# Patient Record
Sex: FEMALE | Race: WHITE | Employment: FULL TIME | URBAN - METROPOLITAN AREA
[De-identification: names, ages, dates, MRNs, and addresses within clinical notes are randomized per-mention and may not be internally consistent; named-entity substitution may affect disease eponyms.]

---

## 2017-01-09 ENCOUNTER — OFFICE VISIT (OUTPATIENT)
Dept: FAMILY MEDICINE CLINIC | Facility: CLINIC | Age: 52
End: 2017-01-09

## 2017-01-09 VITALS
TEMPERATURE: 98 F | SYSTOLIC BLOOD PRESSURE: 110 MMHG | BODY MASS INDEX: 33 KG/M2 | DIASTOLIC BLOOD PRESSURE: 80 MMHG | WEIGHT: 213 LBS | HEART RATE: 76 BPM | RESPIRATION RATE: 16 BRPM

## 2017-01-09 DIAGNOSIS — L03.213 PRESEPTAL CELLULITIS OF RIGHT EYE: Primary | ICD-10-CM

## 2017-01-09 PROCEDURE — 99213 OFFICE O/P EST LOW 20 MIN: CPT | Performed by: FAMILY MEDICINE

## 2017-01-09 RX ORDER — MECLIZINE HYDROCHLORIDE 25 MG/1
25 TABLET ORAL 3 TIMES DAILY PRN
Qty: 15 TABLET | Refills: 0 | Status: SHIPPED | OUTPATIENT
Start: 2017-01-09 | End: 2017-01-24

## 2017-01-09 RX ORDER — OFLOXACIN 3 MG/ML
2 SOLUTION/ DROPS OPHTHALMIC
COMMUNITY
Start: 2017-01-08 | End: 2017-03-13

## 2017-01-09 RX ORDER — CODEINE PHOSPHATE AND GUAIFENESIN 10; 100 MG/5ML; MG/5ML
5 SOLUTION ORAL EVERY 6 HOURS PRN
Qty: 236 ML | Refills: 0 | Status: SHIPPED
Start: 2017-01-09 | End: 2017-03-13

## 2017-01-09 RX ORDER — HYDROCHLOROTHIAZIDE 12.5 MG/1
CAPSULE, GELATIN COATED ORAL
COMMUNITY
End: 2017-01-09

## 2017-01-09 RX ORDER — AMOXICILLIN AND CLAVULANATE POTASSIUM 875; 125 MG/1; MG/1
875 TABLET, FILM COATED ORAL
COMMUNITY
Start: 2017-01-08 | End: 2017-01-19

## 2017-01-09 NOTE — PROGRESS NOTES
[facility] Family Medicine Office Note  Chief Complaint:   Patient presents with:  ER F/U: went to ER  1/7/2017, right eye infection follow up      HPI:   This is a 49year old female coming in for  Eye Problem   The right eye is affected.  This i mL by mouth every 6 (six) hours as needed for congestion. Disp: 236 mL Rfl: 0   Meclizine HCl 25 MG Oral Tab Take 1 tablet (25 mg total) by mouth 3 (three) times daily as needed for Dizziness.  Disp: 15 tablet Rfl: 0   hydrochlorothiazide 25 MG Oral Tab Sebastián Osier Head (left side): No preauricular, no posterior auricular and no occipital adenopathy present. She has cervical adenopathy. Right cervical: Superficial cervical adenopathy present.         Left cervical: Superficial cervical adenopathy present

## 2017-03-01 ENCOUNTER — PATIENT MESSAGE (OUTPATIENT)
Dept: FAMILY MEDICINE CLINIC | Facility: CLINIC | Age: 52
End: 2017-03-01

## 2017-03-02 NOTE — TELEPHONE ENCOUNTER
From: Rabia Favorite  To: Iesha Perry MD  Sent: 3/1/2017 9:58 PM CST  Subject: Non-Urgent Medical Question    Hello,  I see I am way overdue for a Pap smear and a colonoscopy. Should I make an appt with a gynecologist, I do not have one.  Are there gy

## 2017-03-06 NOTE — TELEPHONE ENCOUNTER
Below message received from patient via My Chart CRM Request:    ----- Message -----         From: Tash Garcia        Sent: 3/3/2017   1:34 PM          To:  Emr Feedback     Subject: Physical                                               Topic: Selec

## 2017-03-13 ENCOUNTER — OFFICE VISIT (OUTPATIENT)
Dept: FAMILY MEDICINE CLINIC | Facility: CLINIC | Age: 52
End: 2017-03-13

## 2017-03-13 VITALS
WEIGHT: 212 LBS | HEIGHT: 67 IN | DIASTOLIC BLOOD PRESSURE: 84 MMHG | HEART RATE: 94 BPM | BODY MASS INDEX: 33.27 KG/M2 | RESPIRATION RATE: 16 BRPM | OXYGEN SATURATION: 98 % | SYSTOLIC BLOOD PRESSURE: 120 MMHG

## 2017-03-13 DIAGNOSIS — Z23 NEED FOR DIPHTHERIA-TETANUS-PERTUSSIS (TDAP) VACCINE: ICD-10-CM

## 2017-03-13 DIAGNOSIS — Z13.228 SCREENING FOR ENDOCRINE, NUTRITIONAL, METABOLIC AND IMMUNITY DISORDER: ICD-10-CM

## 2017-03-13 DIAGNOSIS — Z13.0 SCREENING FOR ENDOCRINE, NUTRITIONAL, METABOLIC AND IMMUNITY DISORDER: ICD-10-CM

## 2017-03-13 DIAGNOSIS — R63.5 WEIGHT GAIN: ICD-10-CM

## 2017-03-13 DIAGNOSIS — R35.0 URINARY FREQUENCY: ICD-10-CM

## 2017-03-13 DIAGNOSIS — Z12.39 SCREENING FOR BREAST CANCER: ICD-10-CM

## 2017-03-13 DIAGNOSIS — Z13.29 SCREENING FOR ENDOCRINE, NUTRITIONAL, METABOLIC AND IMMUNITY DISORDER: ICD-10-CM

## 2017-03-13 DIAGNOSIS — Z01.419 WELL FEMALE EXAM WITH ROUTINE GYNECOLOGICAL EXAM: Primary | ICD-10-CM

## 2017-03-13 DIAGNOSIS — Z12.11 COLON CANCER SCREENING: ICD-10-CM

## 2017-03-13 DIAGNOSIS — D22.9 NEVUS OF MULTIPLE SITES: ICD-10-CM

## 2017-03-13 DIAGNOSIS — R10.2 RIGHT ADNEXAL TENDERNESS: ICD-10-CM

## 2017-03-13 DIAGNOSIS — Z12.4 SCREENING FOR CERVICAL CANCER: ICD-10-CM

## 2017-03-13 DIAGNOSIS — R14.0 ABDOMINAL BLOATING: ICD-10-CM

## 2017-03-13 DIAGNOSIS — Z13.21 SCREENING FOR ENDOCRINE, NUTRITIONAL, METABOLIC AND IMMUNITY DISORDER: ICD-10-CM

## 2017-03-13 PROCEDURE — 99396 PREV VISIT EST AGE 40-64: CPT | Performed by: FAMILY MEDICINE

## 2017-03-13 PROCEDURE — 87624 HPV HI-RISK TYP POOLED RSLT: CPT | Performed by: FAMILY MEDICINE

## 2017-03-13 PROCEDURE — 88175 CYTOPATH C/V AUTO FLUID REDO: CPT | Performed by: FAMILY MEDICINE

## 2017-03-13 PROCEDURE — 90715 TDAP VACCINE 7 YRS/> IM: CPT | Performed by: FAMILY MEDICINE

## 2017-03-13 PROCEDURE — 90471 IMMUNIZATION ADMIN: CPT | Performed by: FAMILY MEDICINE

## 2017-03-13 RX ORDER — IBUPROFEN 800 MG/1
TABLET ORAL
COMMUNITY
Start: 2017-02-17 | End: 2017-03-13

## 2017-03-13 RX ORDER — AMOXICILLIN 500 MG/1
CAPSULE ORAL
COMMUNITY
Start: 2017-01-11 | End: 2017-03-13

## 2017-03-13 RX ORDER — CYCLOBENZAPRINE HCL 10 MG
TABLET ORAL
COMMUNITY
Start: 2017-02-17 | End: 2017-03-13

## 2017-03-14 ENCOUNTER — APPOINTMENT (OUTPATIENT)
Dept: LAB | Age: 52
End: 2017-03-14
Attending: FAMILY MEDICINE
Payer: COMMERCIAL

## 2017-03-14 DIAGNOSIS — Z13.29 SCREENING FOR ENDOCRINE, NUTRITIONAL, METABOLIC AND IMMUNITY DISORDER: ICD-10-CM

## 2017-03-14 DIAGNOSIS — R35.0 URINARY FREQUENCY: ICD-10-CM

## 2017-03-14 DIAGNOSIS — Z13.228 SCREENING FOR ENDOCRINE, NUTRITIONAL, METABOLIC AND IMMUNITY DISORDER: ICD-10-CM

## 2017-03-14 DIAGNOSIS — Z13.21 SCREENING FOR ENDOCRINE, NUTRITIONAL, METABOLIC AND IMMUNITY DISORDER: ICD-10-CM

## 2017-03-14 DIAGNOSIS — Z13.0 SCREENING FOR ENDOCRINE, NUTRITIONAL, METABOLIC AND IMMUNITY DISORDER: ICD-10-CM

## 2017-03-14 LAB
25-HYDROXYVITAMIN D (TOTAL): 19.8 NG/ML (ref 30–100)
ALBUMIN SERPL-MCNC: 4 G/DL (ref 3.5–4.8)
ALP LIVER SERPL-CCNC: 98 U/L (ref 39–100)
ALT SERPL-CCNC: 25 U/L (ref 14–54)
AST SERPL-CCNC: 17 U/L (ref 15–41)
BILIRUB SERPL-MCNC: 0.4 MG/DL (ref 0.1–2)
BILIRUB UR QL STRIP.AUTO: NEGATIVE
BUN BLD-MCNC: 17 MG/DL (ref 8–20)
CALCIUM BLD-MCNC: 9.1 MG/DL (ref 8.3–10.3)
CHLORIDE: 106 MMOL/L (ref 101–111)
CHOLEST SMN-MCNC: 221 MG/DL (ref ?–200)
CLARITY UR REFRACT.AUTO: CLEAR
CO2: 31 MMOL/L (ref 22–32)
COLOR UR AUTO: YELLOW
CREAT BLD-MCNC: 0.86 MG/DL (ref 0.55–1.02)
ERYTHROCYTE [DISTWIDTH] IN BLOOD BY AUTOMATED COUNT: 12.7 % (ref 11.5–16)
GLUCOSE BLD-MCNC: 99 MG/DL (ref 70–99)
GLUCOSE UR STRIP.AUTO-MCNC: NEGATIVE MG/DL
HCT VFR BLD AUTO: 41 % (ref 34–50)
HDLC SERPL-MCNC: 45 MG/DL (ref 45–?)
HDLC SERPL: 4.91 {RATIO} (ref ?–4.44)
HGB BLD-MCNC: 13.8 G/DL (ref 12–16)
KETONES UR STRIP.AUTO-MCNC: NEGATIVE MG/DL
LDLC SERPL CALC-MCNC: 122 MG/DL (ref ?–130)
LEUKOCYTE ESTERASE UR QL STRIP.AUTO: NEGATIVE
M PROTEIN MFR SERPL ELPH: 7.6 G/DL (ref 6.1–8.3)
MCH RBC QN AUTO: 31.2 PG (ref 27–33.2)
MCHC RBC AUTO-ENTMCNC: 33.7 G/DL (ref 31–37)
MCV RBC AUTO: 92.8 FL (ref 81–100)
NITRITE UR QL STRIP.AUTO: NEGATIVE
NONHDLC SERPL-MCNC: 176 MG/DL (ref ?–130)
PH UR STRIP.AUTO: 5 [PH] (ref 4.5–8)
PLATELET # BLD AUTO: 244 10(3)UL (ref 150–450)
POTASSIUM SERPL-SCNC: 3.6 MMOL/L (ref 3.6–5.1)
PROT UR STRIP.AUTO-MCNC: NEGATIVE MG/DL
RBC # BLD AUTO: 4.42 X10(6)UL (ref 3.8–5.1)
RED CELL DISTRIBUTION WIDTH-SD: 43.1 FL (ref 35.1–46.3)
SODIUM SERPL-SCNC: 144 MMOL/L (ref 136–144)
SP GR UR STRIP.AUTO: 1.02 (ref 1–1.03)
TRIGLYCERIDES: 271 MG/DL (ref ?–150)
TSI SER-ACNC: 3.05 MIU/ML (ref 0.35–5.5)
UROBILINOGEN UR STRIP.AUTO-MCNC: <2 MG/DL
VLDL: 54 MG/DL (ref 5–40)
WBC # BLD AUTO: 8.5 X10(3) UL (ref 4–13)

## 2017-03-14 PROCEDURE — 82306 VITAMIN D 25 HYDROXY: CPT

## 2017-03-14 PROCEDURE — 80061 LIPID PANEL: CPT

## 2017-03-14 PROCEDURE — 36415 COLL VENOUS BLD VENIPUNCTURE: CPT

## 2017-03-14 PROCEDURE — 84443 ASSAY THYROID STIM HORMONE: CPT

## 2017-03-14 PROCEDURE — 80053 COMPREHEN METABOLIC PANEL: CPT

## 2017-03-14 PROCEDURE — 81001 URINALYSIS AUTO W/SCOPE: CPT

## 2017-03-14 PROCEDURE — 85027 COMPLETE CBC AUTOMATED: CPT

## 2017-03-14 RX ORDER — HYDROCHLOROTHIAZIDE 25 MG/1
25 TABLET ORAL
Qty: 90 TABLET | Refills: 1 | Status: SHIPPED | OUTPATIENT
Start: 2017-03-14 | End: 2017-03-14

## 2017-03-14 RX ORDER — HYDROCHLOROTHIAZIDE 25 MG/1
25 TABLET ORAL
Qty: 30 TABLET | Refills: 0 | Status: SHIPPED | OUTPATIENT
Start: 2017-03-14 | End: 2017-04-06

## 2017-03-14 NOTE — TELEPHONE ENCOUNTER
Below message received through My Chart CRM request:    ----- Message -----         From: Adri Box        Sent: 3/14/2017   2:48 PM          To:  Emr Feedback     Subject: Prescription                                           Topic: Selection

## 2017-03-15 NOTE — PROGRESS NOTES
Quick Note:    order Vit D [id number] once weekly for 12 weeks.  Then 4000 units daily  Start omega 3 1 g bid for elevated triglycerides  Repeat vit D and lipids in 6 mo  UA stable  ______

## 2017-03-20 LAB — HPV I/H RISK 1 DNA SPEC QL NAA+PROBE: NEGATIVE

## 2017-03-22 ENCOUNTER — PATIENT MESSAGE (OUTPATIENT)
Dept: FAMILY MEDICINE CLINIC | Facility: CLINIC | Age: 52
End: 2017-03-22

## 2017-03-22 DIAGNOSIS — E55.9 VITAMIN D DEFICIENCY: Primary | ICD-10-CM

## 2017-03-22 DIAGNOSIS — E78.2 ELEVATED TRIGLYCERIDES WITH HIGH CHOLESTEROL: ICD-10-CM

## 2017-03-23 RX ORDER — ERGOCALCIFEROL 1.25 MG/1
50000 CAPSULE ORAL WEEKLY
Qty: 12 CAPSULE | Refills: 0 | Status: SHIPPED | OUTPATIENT
Start: 2017-03-23 | End: 2017-06-22

## 2017-03-23 NOTE — TELEPHONE ENCOUNTER
Notes Recorded by Iesha Perry MD on 3/15/2017 at 5:17 PM  order Vit D [id number] once weekly for 12 weeks.  Then 4000 units daily  Start omega 3 1 g bid for elevated triglycerides  Repeat vit D and lipids in 6 mo  UA stable

## 2017-03-23 NOTE — TELEPHONE ENCOUNTER
From: Karime Corona  To: Pao Ventura MD  Sent: 3/22/2017 10:22 AM CDT  Subject: Test Results Question    I seen all. rhe results, and are not sure what they mean. what is the next step?

## 2017-03-27 ENCOUNTER — TELEPHONE (OUTPATIENT)
Dept: FAMILY MEDICINE CLINIC | Facility: CLINIC | Age: 52
End: 2017-03-27

## 2017-04-07 RX ORDER — HYDROCHLOROTHIAZIDE 25 MG/1
TABLET ORAL
Qty: 30 TABLET | Refills: 4 | Status: SHIPPED | OUTPATIENT
Start: 2017-04-07 | End: 2018-03-30

## 2017-04-13 ENCOUNTER — PATIENT MESSAGE (OUTPATIENT)
Dept: FAMILY MEDICINE CLINIC | Facility: CLINIC | Age: 52
End: 2017-04-13

## 2017-04-14 NOTE — TELEPHONE ENCOUNTER
From: Marty Shine  To: Reji Valdes MD  Sent: 4/13/2017 9:58 AM CDT  Subject: Visit Follow-up Melani Head- Dr Erma Amador ordered a colonoscopy, and I called central scheduling and they said I need a Drs order! ? Please advise as to how to get this

## 2017-06-01 RX ORDER — ERGOCALCIFEROL 1.25 MG/1
CAPSULE ORAL
Qty: 12 CAPSULE | Refills: 0 | OUTPATIENT
Start: 2017-06-01

## 2017-06-01 NOTE — TELEPHONE ENCOUNTER
Refill request  After 12 weeks, patient should take 4000 units daily, patient was notified, and stated understanding and did not have any further questions or concerns.

## 2017-06-22 ENCOUNTER — APPOINTMENT (OUTPATIENT)
Dept: GENERAL RADIOLOGY | Age: 52
End: 2017-06-22
Attending: NURSE PRACTITIONER
Payer: COMMERCIAL

## 2017-06-22 ENCOUNTER — HOSPITAL ENCOUNTER (OUTPATIENT)
Age: 52
Discharge: HOME | End: 2017-06-22
Payer: COMMERCIAL

## 2017-06-22 VITALS
DIASTOLIC BLOOD PRESSURE: 85 MMHG | HEART RATE: 71 BPM | TEMPERATURE: 98 F | OXYGEN SATURATION: 100 % | RESPIRATION RATE: 18 BRPM | SYSTOLIC BLOOD PRESSURE: 141 MMHG

## 2017-06-22 DIAGNOSIS — S43.402A SPRAIN OF LEFT SHOULDER, UNSPECIFIED SHOULDER SPRAIN TYPE, INITIAL ENCOUNTER: Primary | ICD-10-CM

## 2017-06-22 PROCEDURE — 99203 OFFICE O/P NEW LOW 30 MIN: CPT

## 2017-06-22 PROCEDURE — 88305 TISSUE EXAM BY PATHOLOGIST: CPT | Performed by: INTERNAL MEDICINE

## 2017-06-22 PROCEDURE — 73030 X-RAY EXAM OF SHOULDER: CPT | Performed by: NURSE PRACTITIONER

## 2017-06-22 NOTE — ED PROVIDER NOTES
Patient presents with:  Musculoskeletal Problem      HPI:     Jose Moe is a 49year old female who presents today with a chief complaint of pain in the shoulder after an injury that occurred a few days ago.   The patient states she slipped and fel capillary refill: Yes  ROM:  pain to the left shoulder with ROM. No deformity. No clavicle pain. Point Tenderness: Yes No clavicle pain.     /85 mmHg  Pulse 71  Temp(Src) 98 °F (36.7 °C) (Oral)  Resp 18  SpO2 100%  GENERAL: well developed, well nour visit in 2 days      Jason Matta MD  [address]  [phone]    Schedule an appointment as soon as possible for a visit in 2 days

## 2017-08-11 ENCOUNTER — OFFICE VISIT (OUTPATIENT)
Dept: FAMILY MEDICINE CLINIC | Facility: CLINIC | Age: 52
End: 2017-08-11

## 2017-08-11 VITALS
SYSTOLIC BLOOD PRESSURE: 112 MMHG | RESPIRATION RATE: 15 BRPM | TEMPERATURE: 99 F | WEIGHT: 218 LBS | HEART RATE: 66 BPM | BODY MASS INDEX: 34 KG/M2 | OXYGEN SATURATION: 96 % | DIASTOLIC BLOOD PRESSURE: 82 MMHG

## 2017-08-11 DIAGNOSIS — J40 BRONCHITIS: ICD-10-CM

## 2017-08-11 DIAGNOSIS — H65.91 RIGHT NON-SUPPURATIVE OTITIS MEDIA: ICD-10-CM

## 2017-08-11 DIAGNOSIS — J02.9 SORE THROAT: Primary | ICD-10-CM

## 2017-08-11 DIAGNOSIS — J01.10 ACUTE NON-RECURRENT FRONTAL SINUSITIS: ICD-10-CM

## 2017-08-11 LAB
CONTROL LINE PRESENT WITH A CLEAR BACKGROUND (YES/NO): YES YES/NO
STREP GRP A CUL-SCR: NEGATIVE

## 2017-08-11 PROCEDURE — 99213 OFFICE O/P EST LOW 20 MIN: CPT | Performed by: PHYSICIAN ASSISTANT

## 2017-08-11 PROCEDURE — 87880 STREP A ASSAY W/OPTIC: CPT | Performed by: PHYSICIAN ASSISTANT

## 2017-08-11 RX ORDER — CODEINE PHOSPHATE AND GUAIFENESIN 10; 100 MG/5ML; MG/5ML
5 SOLUTION ORAL EVERY 6 HOURS PRN
Qty: 118 ML | Refills: 0 | Status: SHIPPED | OUTPATIENT
Start: 2017-08-11 | End: 2017-08-18

## 2017-08-11 RX ORDER — ALBUTEROL SULFATE 90 UG/1
2 AEROSOL, METERED RESPIRATORY (INHALATION) EVERY 6 HOURS PRN
Qty: 1 INHALER | Refills: 0 | Status: SHIPPED | OUTPATIENT
Start: 2017-08-11 | End: 2017-08-29

## 2017-08-11 RX ORDER — CEFDINIR 300 MG/1
300 CAPSULE ORAL 2 TIMES DAILY
Qty: 20 CAPSULE | Refills: 0 | Status: SHIPPED | OUTPATIENT
Start: 2017-08-11 | End: 2017-08-21

## 2017-08-11 RX ORDER — PREDNISONE 20 MG/1
20 TABLET ORAL 2 TIMES DAILY
Qty: 10 TABLET | Refills: 0 | Status: SHIPPED | OUTPATIENT
Start: 2017-08-11 | End: 2017-08-16

## 2017-08-11 NOTE — PROGRESS NOTES
CHIEF COMPLAINT:   Patient presents with:  Cough: Pt c/o cough, nasal congestion and sore throat X 2 weeks         HPI:   Stacy Hughes is a 47year old female who presents for congestion for 2 weeks. She admits to runny nose.  Some blood streaked in n lymphadenopathy. ASSESSMENT AND PLAN:       Acute non-recurrent frontal sinusitis/sore throat/otitis media   -     cefdinir 300 MG Oral Cap;  Take 1 capsule (300 mg total) by mouth 2 (two) times daily.  - Rapid strep negative   - Saline nasal spray

## 2017-08-29 DIAGNOSIS — J40 BRONCHITIS: ICD-10-CM

## 2017-08-29 NOTE — TELEPHONE ENCOUNTER
LOV :08/11/17  LF?       Pending Prescriptions Disp Refills    PROAIR  (90 Base) MCG/ACT Inhalation Aero Soln [Pharmacy Med Name: Juice Crowley HFA ORAL INH (200  PFS) 8.5G] 8.5 g 0     Sig: INHALE 2 PUFFS INTO THE LUNGS EVERY 6 HOURS AS NEEDED FOR WHEEZING

## 2017-09-29 ENCOUNTER — HOSPITAL ENCOUNTER (OUTPATIENT)
Dept: CT IMAGING | Age: 52
Discharge: HOME | End: 2017-09-29
Attending: FAMILY MEDICINE
Payer: COMMERCIAL

## 2017-09-29 ENCOUNTER — OFFICE VISIT (OUTPATIENT)
Dept: FAMILY MEDICINE CLINIC | Facility: CLINIC | Age: 52
End: 2017-09-29

## 2017-09-29 ENCOUNTER — LAB ENCOUNTER (OUTPATIENT)
Dept: LAB | Age: 52
End: 2017-09-29
Attending: FAMILY MEDICINE
Payer: COMMERCIAL

## 2017-09-29 VITALS
BODY MASS INDEX: 33.74 KG/M2 | TEMPERATURE: 98 F | HEART RATE: 80 BPM | DIASTOLIC BLOOD PRESSURE: 80 MMHG | SYSTOLIC BLOOD PRESSURE: 118 MMHG | WEIGHT: 215 LBS | HEIGHT: 67 IN | RESPIRATION RATE: 16 BRPM

## 2017-09-29 DIAGNOSIS — R10.84 GENERALIZED ABDOMINAL PAIN: ICD-10-CM

## 2017-09-29 DIAGNOSIS — R10.84 GENERALIZED ABDOMINAL PAIN: Primary | ICD-10-CM

## 2017-09-29 LAB
APPEARANCE: CLEAR
BILIRUBIN: NEGATIVE
GLUCOSE (URINE DIPSTICK): NEGATIVE MG/DL
KETONES (URINE DIPSTICK): NEGATIVE MG/DL
LEUKOCYTES: NEGATIVE
MULTISTIX LOT#: ABNORMAL NUMERIC
NITRITE, URINE: NEGATIVE
PH, URINE: 6 (ref 4.5–8)
PROTEIN (URINE DIPSTICK): NEGATIVE MG/DL
SPECIFIC GRAVITY: 1.01 (ref 1–1.03)
URINE-COLOR: YELLOW
UROBILINOGEN,SEMI-QN: 0.2 MG/DL (ref 0–1.9)

## 2017-09-29 PROCEDURE — 81003 URINALYSIS AUTO W/O SCOPE: CPT | Performed by: FAMILY MEDICINE

## 2017-09-29 PROCEDURE — 80053 COMPREHEN METABOLIC PANEL: CPT

## 2017-09-29 PROCEDURE — 85025 COMPLETE CBC W/AUTO DIFF WBC: CPT

## 2017-09-29 PROCEDURE — 87086 URINE CULTURE/COLONY COUNT: CPT | Performed by: FAMILY MEDICINE

## 2017-09-29 PROCEDURE — 74177 CT ABD & PELVIS W/CONTRAST: CPT | Performed by: FAMILY MEDICINE

## 2017-09-29 PROCEDURE — 99214 OFFICE O/P EST MOD 30 MIN: CPT | Performed by: FAMILY MEDICINE

## 2017-09-29 PROCEDURE — 83690 ASSAY OF LIPASE: CPT

## 2017-09-29 PROCEDURE — 36415 COLL VENOUS BLD VENIPUNCTURE: CPT

## 2017-09-29 RX ORDER — METRONIDAZOLE 500 MG/1
500 TABLET ORAL 3 TIMES DAILY
Qty: 30 TABLET | Refills: 0 | Status: SHIPPED | OUTPATIENT
Start: 2017-09-29 | End: 2017-10-09

## 2017-09-29 RX ORDER — CIPROFLOXACIN 500 MG/1
500 TABLET, FILM COATED ORAL 2 TIMES DAILY
Qty: 20 TABLET | Refills: 0 | Status: SHIPPED | OUTPATIENT
Start: 2017-09-29 | End: 2017-10-09

## 2017-09-29 NOTE — PROGRESS NOTES
[facility] Family Medicine Office Note  Chief Complaint:   Patient presents with:  Abdominal Pain: Symptoms started two weeks ago, stomach cramping, change in bowel habits, no appetite       HPI:   This is a 47year old female coming in for  HPI daily. Disp: 20 tablet Rfl: 0   metRONIDAZOLE 500 MG Oral Tab Take 1 tablet (500 mg total) by mouth 3 (three) times daily.  Disp: 30 tablet Rfl: 0   HYDROCHLOROTHIAZIDE 25 MG Oral Tab TAKE 1 TABLET(25 MG) BY MOUTH EVERY DAY Disp: 30 tablet Rfl: 4      Couns ASSESSMENT AND PLAN:   1. Generalized abdominal pain  Possible severe constipation  With history of diverticulosis - possible acute diverticulitis  Stat CT abd/pelvis  - CT ABDOMEN+PELVIS(CONTRAST ONLY)(CPT=74177);  Future  - URINALYSIS, AUTO, W/O SCOPE

## 2017-10-04 ENCOUNTER — PATIENT MESSAGE (OUTPATIENT)
Dept: FAMILY MEDICINE CLINIC | Facility: CLINIC | Age: 52
End: 2017-10-04

## 2017-10-05 NOTE — TELEPHONE ENCOUNTER
From: Karime Corona  To: Pao Ventura MD  Sent: 10/4/2017 8:57 PM CDT  Subject: Test Results Question    I’m relieved to see blood tests are normal. I do feel like I have tons of internal inflammation.  I have body aches most of the time, my knees swe

## 2017-10-05 NOTE — TELEPHONE ENCOUNTER
Please see condition update and advise. Patient was last seen in the office on 9/29/17 for abdominal pain. CT confirmed diverticulitis. Any new orders or recommendations for patient, or would you like her to follow up in the office? Please advise.  Thank yo

## 2017-10-10 RX ORDER — IBUPROFEN AND FAMOTIDINE 26.6; 8 MG/1; MG/1
1 TABLET, FILM COATED ORAL 3 TIMES DAILY PRN
Qty: 90 TABLET | Refills: 0 | Status: SHIPPED | OUTPATIENT
Start: 2017-10-10 | End: 2017-11-09

## 2017-10-10 NOTE — TELEPHONE ENCOUNTER
Will need ortho referral.   Ice is best treatment with least side effects. Can consider duexis for for pain control - NSAID with antacid to protect stomach - will send to pharmacy.    lombardi

## 2017-11-02 ENCOUNTER — PATIENT MESSAGE (OUTPATIENT)
Dept: FAMILY MEDICINE CLINIC | Facility: CLINIC | Age: 52
End: 2017-11-02

## 2017-11-03 NOTE — TELEPHONE ENCOUNTER
From: Rabia Neilite  To: Iesha Perry MD  Sent: 11/2/2017 8:55 AM CDT  Subject: Prescription Question    I was prescribed Duexis.  This medication has been working great, leg pain is 70% better, back pain 50% better, one problem is I've had loose stoo

## 2017-11-06 NOTE — TELEPHONE ENCOUNTER
Any GI medication such as H2 or PPI can cause loose stools.    Recommend probiotic such as Culturelle, Align, Digestive Advantage

## 2017-11-10 RX ORDER — IBUPROFEN AND FAMOTIDINE 800; 26.6 MG/1; MG/1
TABLET, COATED ORAL
Qty: 90 TABLET | Refills: 5 | Status: SHIPPED | OUTPATIENT
Start: 2017-11-10 | End: 2018-07-15

## 2017-11-10 NOTE — TELEPHONE ENCOUNTER
DUEXIS 800-26. 6MG TABLETS  Will file in chart as: DUEXIS 800-26.6 MG Oral Tab  TAKE 1 TABLET BY MOUTH THREE TIMES DAILY AS NEEDED       Disp: 90 tablet Refills: 0    Class: Normal Start: 11/9/2017   Originally ordered: 1 month ago by Brenda Mancilla MD  Last

## 2017-11-21 RX ORDER — HYDROCHLOROTHIAZIDE 25 MG/1
TABLET ORAL
Qty: 90 TABLET | Refills: 0 | Status: SHIPPED | OUTPATIENT
Start: 2017-11-21 | End: 2018-02-19

## 2017-11-21 NOTE — TELEPHONE ENCOUNTER
Medication(s) to Refill:   Pending Prescriptions Disp Refills    HYDROCHLOROTHIAZIDE 25 MG Oral Tab [Pharmacy Med Name: HYDROCHLOROTHIAZIDE TAB 25MG] 90 tablet 1     Sig: TAKE 1 TABLET DAILY           Last Time Medication was Filled:  4/7/2017    Last Offi

## 2018-02-19 RX ORDER — HYDROCHLOROTHIAZIDE 25 MG/1
TABLET ORAL
Qty: 90 TABLET | Refills: 0 | Status: SHIPPED | OUTPATIENT
Start: 2018-02-19 | End: 2018-03-30

## 2018-03-30 ENCOUNTER — OFFICE VISIT (OUTPATIENT)
Dept: FAMILY MEDICINE CLINIC | Facility: CLINIC | Age: 53
End: 2018-03-30

## 2018-03-30 VITALS
WEIGHT: 214 LBS | HEIGHT: 67 IN | DIASTOLIC BLOOD PRESSURE: 80 MMHG | OXYGEN SATURATION: 98 % | RESPIRATION RATE: 18 BRPM | SYSTOLIC BLOOD PRESSURE: 122 MMHG | HEART RATE: 78 BPM | BODY MASS INDEX: 33.59 KG/M2 | TEMPERATURE: 99 F

## 2018-03-30 DIAGNOSIS — N39.0 URINARY TRACT INFECTION WITH HEMATURIA, SITE UNSPECIFIED: Primary | ICD-10-CM

## 2018-03-30 DIAGNOSIS — I10 HYPERTENSION, UNSPECIFIED TYPE: ICD-10-CM

## 2018-03-30 DIAGNOSIS — R31.9 URINARY TRACT INFECTION WITH HEMATURIA, SITE UNSPECIFIED: Primary | ICD-10-CM

## 2018-03-30 DIAGNOSIS — Z12.31 VISIT FOR SCREENING MAMMOGRAM: ICD-10-CM

## 2018-03-30 DIAGNOSIS — R35.0 URINARY FREQUENCY: ICD-10-CM

## 2018-03-30 DIAGNOSIS — E66.9 OBESITY (BMI 30.0-34.9): ICD-10-CM

## 2018-03-30 LAB
APPEARANCE: CLEAR
MULTISTIX LOT#: ABNORMAL NUMERIC
PH, URINE: 7 (ref 4.5–8)
SPECIFIC GRAVITY: 1.02 (ref 1–1.03)
URINE-COLOR: YELLOW
UROBILINOGEN,SEMI-QN: 0.2 MG/DL (ref 0–1.9)

## 2018-03-30 PROCEDURE — 81003 URINALYSIS AUTO W/O SCOPE: CPT | Performed by: FAMILY MEDICINE

## 2018-03-30 PROCEDURE — 99213 OFFICE O/P EST LOW 20 MIN: CPT | Performed by: FAMILY MEDICINE

## 2018-03-30 PROCEDURE — 87086 URINE CULTURE/COLONY COUNT: CPT | Performed by: FAMILY MEDICINE

## 2018-03-30 RX ORDER — NITROFURANTOIN 25; 75 MG/1; MG/1
100 CAPSULE ORAL 2 TIMES DAILY
Qty: 14 CAPSULE | Refills: 0 | Status: SHIPPED | OUTPATIENT
Start: 2018-03-30 | End: 2018-04-16

## 2018-03-30 RX ORDER — NITROFURANTOIN 25; 75 MG/1; MG/1
100 CAPSULE ORAL 2 TIMES DAILY
Qty: 14 CAPSULE | Refills: 0 | Status: SHIPPED | OUTPATIENT
Start: 2018-03-30 | End: 2018-03-30

## 2018-03-30 RX ORDER — HYDROCHLOROTHIAZIDE 25 MG/1
TABLET ORAL
Qty: 90 TABLET | Refills: 3 | Status: SHIPPED | OUTPATIENT
Start: 2018-03-30 | End: 2019-04-22

## 2018-03-30 NOTE — PROGRESS NOTES
[facility] Family Medicine Office Note  Chief Complaint:   Patient presents with:  Urinary Frequency      HPI:   This is a 47year old female coming in for  HPI  Patient presents with symptoms of UTI.  Complaining of urinary frequency, urgency, dy REVIEW OF SYSTEMS:   Review of Systems   Constitutional: Negative. HENT: Negative. Eyes: Negative. Respiratory: Negative. Cardiovascular: Negative. Gastrointestinal: Negative. Endocrine: Negative.     Genitourinary: Positive for dysuri Nitrofurantoin Monohyd Macro 100 MG Oral Cap 14 capsule 0      Sig: Take 1 capsule (100 mg total) by mouth 2 (two) times daily.            Health Maintenance:  Mammogram,1 Yr due on 02/25/2014  Influenza Vaccine(1) due on 09/01/2017  Annual Depression Scree

## 2018-04-16 ENCOUNTER — OFFICE VISIT (OUTPATIENT)
Dept: INTERNAL MEDICINE CLINIC | Facility: CLINIC | Age: 53
End: 2018-04-16

## 2018-04-16 VITALS
RESPIRATION RATE: 16 BRPM | DIASTOLIC BLOOD PRESSURE: 80 MMHG | SYSTOLIC BLOOD PRESSURE: 110 MMHG | WEIGHT: 213 LBS | BODY MASS INDEX: 33.83 KG/M2 | HEIGHT: 66.5 IN | HEART RATE: 84 BPM

## 2018-04-16 DIAGNOSIS — E66.9 OBESITY (BMI 30-39.9): ICD-10-CM

## 2018-04-16 DIAGNOSIS — Z51.81 THERAPEUTIC DRUG MONITORING: Primary | ICD-10-CM

## 2018-04-16 PROCEDURE — 99203 OFFICE O/P NEW LOW 30 MIN: CPT | Performed by: NURSE PRACTITIONER

## 2018-04-16 RX ORDER — METFORMIN HYDROCHLORIDE 750 MG/1
750 TABLET, EXTENDED RELEASE ORAL DAILY
Qty: 30 TABLET | Refills: 1 | Status: SHIPPED | OUTPATIENT
Start: 2018-04-16 | End: 2018-05-14

## 2018-04-16 NOTE — PROGRESS NOTES
HISTORY OF PRESENT ILLNESS  Patient presents with:  Weight Problem: referred by PCP, discuss weight loss, Karime garrido Cj is a 47year old female new to our office today for initiation of medical weight loss program.  Patient presents to swim, yoga   Stress level: 7-8/10  Sleep hours and integrity:  8 Hours per night    MEDICAL HISTORY  PMH reviewed:   Cardiac disorders: htn  Diabetes: negative  Thyroid disease: negative  Renal disease: negative   Kidney stones: negative  Liver disease: ne 09/29/2017   MCHC 33.5 09/29/2017   RDW 12.7 09/29/2017   .0 09/29/2017   MPV 10.8 02/20/2013       Lab Results  Component Value Date   GLU 91 09/29/2017   BUN 10 09/29/2017   CREATSERUM 0.83 09/29/2017   GFR 82 09/29/2017   GFRNAA 92 06/23/2014   G Oral Tablet 24 Hr    Initial Weight Data and Goal Weight Loss:  Weight Calculations  Initial Weight: 213 lbs  Today's Weight: 213 lbs  5% Goal: 10.65  10% Goal: 21.3  Total Weight Loss: 0 lbs  Reviewed    Readiness for Lifestyle change: 9/10, Interest in M topamax or trokendi   Go to the lab for blood work  Follow up with me in 1 month  Schedule follow up appointments: Joaquina Velazquez (dietitian) & Armida Solomon (behavorial psychologist)    Please try to work on the following dietary changes:  1.  Drink lots of wa

## 2018-04-16 NOTE — PATIENT INSTRUCTIONS
Plan:  Medications: Start taking Meformin (1 time per day).  Ask work about possibly starting topamax or trokendi   Go to the lab for blood work  Follow up with me in 1 month  Schedule follow up appointments: Jennifer Hernandez (dietitian) & Pearl Latham (behavori

## 2018-05-14 ENCOUNTER — OFFICE VISIT (OUTPATIENT)
Dept: INTERNAL MEDICINE CLINIC | Facility: CLINIC | Age: 53
End: 2018-05-14

## 2018-05-14 VITALS
SYSTOLIC BLOOD PRESSURE: 136 MMHG | WEIGHT: 211.13 LBS | OXYGEN SATURATION: 98 % | HEART RATE: 77 BPM | RESPIRATION RATE: 16 BRPM | HEIGHT: 66.5 IN | DIASTOLIC BLOOD PRESSURE: 70 MMHG | BODY MASS INDEX: 33.53 KG/M2

## 2018-05-14 DIAGNOSIS — Z51.81 THERAPEUTIC DRUG MONITORING: ICD-10-CM

## 2018-05-14 DIAGNOSIS — E66.9 OBESITY (BMI 30-39.9): ICD-10-CM

## 2018-05-14 PROCEDURE — 99214 OFFICE O/P EST MOD 30 MIN: CPT | Performed by: NURSE PRACTITIONER

## 2018-05-14 RX ORDER — METFORMIN HYDROCHLORIDE 750 MG/1
750 TABLET, EXTENDED RELEASE ORAL DAILY
Qty: 30 TABLET | Refills: 1 | Status: SHIPPED | OUTPATIENT
Start: 2018-05-14 | End: 2018-06-11

## 2018-05-14 RX ORDER — PHENTERMINE HYDROCHLORIDE 15 MG/1
15 CAPSULE ORAL EVERY MORNING
Qty: 30 CAPSULE | Refills: 0 | Status: SHIPPED | OUTPATIENT
Start: 2018-05-14 | End: 2018-06-11

## 2018-05-14 NOTE — PROGRESS NOTES
HISTORY OF PRESENT ILLNESS  Patient presents with:  Weight Check: down 2lbs, using Metformin     Rabia Favorite is a 47year old female here for follow up with medical weight loss program for the treatment of overweight, obesity, or morbid obesity.  Richard Sinha medium/large  Eats 3 meals per day: yes   Number of restaurant or fast food meals/week: 3-4  meals/week    Social hx and lifestyle reviewed:    Work:    Marital status:  (31 yrs) and has 2 children in 20s)--> 1 child lives at home normal S1 and S2 without clicks or gallops, no pedal edema.    GI: rotund, no masses, HSM or tenderness  MUSCULOSKELETAL: grossly intact  NEURO: Oriented times three, full ROM of bilateral UE/LE  PSYCH: pleasant, cooperative, normal mood and affect      Lab Tablet 24 Hr,         Phentermine HCl 15 MG Oral Cap    Initial Weight Data and Goal Weight Loss:  Weight Calculations  Initial Weight: 213 lbs  Today's Weight: 211.2 lbs  5% Goal: 10.65  10% Goal: 21.3  Total Weight Loss: 1.8 lbs  Reviewed    Counseled on Metformin   Follow up with me in 1 month  Schedule follow up appointments: Kayode Ventura (dietitian) & Emeline Romberg (behavorial psychologist)    Please try to work on the following dietary changes:  1.  Drink lots of water and cut down on soda/juice consumpti

## 2018-05-14 NOTE — PATIENT INSTRUCTIONS
Plan:  Medications: start taking phentermine 15mg daily, and continue with Metformin   Follow up with me in 1 month  Schedule follow up appointments: Cedrick Sebastian (dietitian) & Delfino Garcia (behavorial psychologist)    Please try to work on the following [employer]

## 2018-05-21 ENCOUNTER — APPOINTMENT (OUTPATIENT)
Dept: GENERAL RADIOLOGY | Age: 53
End: 2018-05-21
Payer: OTHER MISCELLANEOUS

## 2018-05-21 ENCOUNTER — HOSPITAL ENCOUNTER (EMERGENCY)
Age: 53
Discharge: HOME | End: 2018-05-22
Attending: EMERGENCY MEDICINE
Payer: OTHER MISCELLANEOUS

## 2018-05-21 VITALS
WEIGHT: 210 LBS | SYSTOLIC BLOOD PRESSURE: 137 MMHG | TEMPERATURE: 99 F | HEIGHT: 67 IN | RESPIRATION RATE: 18 BRPM | HEART RATE: 69 BPM | BODY MASS INDEX: 32.96 KG/M2 | OXYGEN SATURATION: 97 % | DIASTOLIC BLOOD PRESSURE: 68 MMHG

## 2018-05-21 DIAGNOSIS — M75.82 ROTATOR CUFF TENDONITIS, LEFT: ICD-10-CM

## 2018-05-21 DIAGNOSIS — M62.838 TRAPEZIUS MUSCLE SPASM: Primary | ICD-10-CM

## 2018-05-21 PROCEDURE — 73030 X-RAY EXAM OF SHOULDER: CPT

## 2018-05-21 PROCEDURE — 99283 EMERGENCY DEPT VISIT LOW MDM: CPT

## 2018-05-22 RX ORDER — CYCLOBENZAPRINE HCL 10 MG
10 TABLET ORAL 3 TIMES DAILY PRN
Qty: 20 TABLET | Refills: 0 | Status: SHIPPED | OUTPATIENT
Start: 2018-05-22 | End: 2018-05-29

## 2018-05-22 RX ORDER — IBUPROFEN 600 MG/1
600 TABLET ORAL EVERY 8 HOURS PRN
Qty: 30 TABLET | Refills: 0 | Status: SHIPPED | OUTPATIENT
Start: 2018-05-22 | End: 2018-05-29 | Stop reason: WASHOUT

## 2018-05-22 NOTE — ED PROVIDER NOTES
Patient Seen in: [address] In [place]    History   Patient presents with:  Upper Extremity Injury (musculoskeletal)    Stated Complaint: LEFT SHOULDER PAIN    HPI    Patient is a 80-year-old female , presenting to the src: Temporal  SpO2: 97 %  O2 Device: None (Room air)    Current:/68   Pulse 69   Temp 98.6 °F (37 °C) (Temporal)   Resp 18   Ht 170.2 cm (5' 7\")   Wt 95.3 kg   SpO2 97%   BMI 32.89 kg/m²         Physical Exam  General: Alert and oriented in no dist flexion and extension without pain. Resisted internal/external rotation of the left shoulder in empty can testing is galindo and without pain. Skin: No lacerations or abrasions. No masses or nodules or abnormalities.   Psych: Normal interaction, cooperati Refills: 0

## 2018-05-22 NOTE — ED INITIAL ASSESSMENT (HPI)
PT TO ED FOR EVAL OF LEFT SHOULDER PAIN PT STATES AT WORK LAST NOC \"HIT TURBULENCE AND I WAS HOLDING ON IN AN FUNNY POSITION\" AND NOW MY SHOULDER ARM MY ARM IS REALLY STIFF

## 2018-06-11 ENCOUNTER — OFFICE VISIT (OUTPATIENT)
Dept: INTERNAL MEDICINE CLINIC | Facility: CLINIC | Age: 53
End: 2018-06-11

## 2018-06-11 VITALS
RESPIRATION RATE: 16 BRPM | WEIGHT: 205 LBS | DIASTOLIC BLOOD PRESSURE: 80 MMHG | BODY MASS INDEX: 32.56 KG/M2 | SYSTOLIC BLOOD PRESSURE: 138 MMHG | HEIGHT: 66.5 IN | HEART RATE: 76 BPM

## 2018-06-11 DIAGNOSIS — Z51.81 THERAPEUTIC DRUG MONITORING: Primary | ICD-10-CM

## 2018-06-11 DIAGNOSIS — I10 HYPERTENSION, UNSPECIFIED TYPE: ICD-10-CM

## 2018-06-11 DIAGNOSIS — M47.816 LUMBAR ARTHROPATHY: ICD-10-CM

## 2018-06-11 DIAGNOSIS — E66.9 OBESITY (BMI 30-39.9): ICD-10-CM

## 2018-06-11 PROCEDURE — 99213 OFFICE O/P EST LOW 20 MIN: CPT | Performed by: NURSE PRACTITIONER

## 2018-06-11 RX ORDER — PHENTERMINE HYDROCHLORIDE 15 MG/1
15 CAPSULE ORAL EVERY MORNING
Qty: 30 CAPSULE | Refills: 0 | Status: SHIPPED | OUTPATIENT
Start: 2018-06-11 | End: 2018-07-10

## 2018-06-11 RX ORDER — METFORMIN HYDROCHLORIDE 750 MG/1
750 TABLET, EXTENDED RELEASE ORAL DAILY
Qty: 90 TABLET | Refills: 0 | Status: SHIPPED | OUTPATIENT
Start: 2018-06-11 | End: 2018-08-13

## 2018-06-11 NOTE — PROGRESS NOTES
HISTORY OF PRESENT ILLNESS  Patient presents with:  Weight Check: lost 6 pounds     Pari Garcia is a 47year old female here for follow up with medical weight loss program for the treatment of overweight, obesity, or morbid obesity.  Patient has lost fast food meals/week: 3-4  meals/week    Social hx and lifestyle reviewed:    Work:    Marital status:  (31 yrs) and has 2 children in 20s)--> 1 child lives at home   Support: yes  Tobacco use: quit 23 yrs ago  ETOH use: 1 drink ever masses, HSM or tenderness  MUSCULOSKELETAL: grossly intact  NEURO: Oriented times three, full ROM of bilateral UE/LE  PSYCH: pleasant, cooperative, normal mood and affect      Lab Results  Component Value Date   WBC 10.2 09/29/2017   RBC 4.56 09/29/2017 Hr    (I10) Hypertension, unspecified type    (M46.96) Lumbar arthropathy (HCC)    Initial Weight Data and Goal Weight Loss:  Weight Calculations  Initial Weight: 213 lbs  Today's Weight: 205 lbs  5% Goal: 10.65  10% Goal: 21.3  Total Weight Loss: 8 lbs  R (hasnt' done labs that were ordered with last visit)     Patient Instructions   Keep up the great work! ! #6 lbs of weight loss!!    Plan:  Continue with medications: phentermine 15mg, Metformin 750mg daily  Go to the lab for blood work   Follow up with me

## 2018-06-11 NOTE — PATIENT INSTRUCTIONS
Keep up the great work! ! #6 lbs of weight loss!!    Plan:  Continue with medications: phentermine 15mg, Metformin 750mg daily  Go to the lab for blood work   Follow up with me in 1 month  Schedule follow up appointments: Rekha Chance (dietitian)    Please t

## 2018-07-10 ENCOUNTER — OFFICE VISIT (OUTPATIENT)
Dept: INTERNAL MEDICINE CLINIC | Facility: CLINIC | Age: 53
End: 2018-07-10

## 2018-07-10 VITALS
SYSTOLIC BLOOD PRESSURE: 110 MMHG | WEIGHT: 199 LBS | HEART RATE: 76 BPM | DIASTOLIC BLOOD PRESSURE: 82 MMHG | RESPIRATION RATE: 14 BRPM | BODY MASS INDEX: 32 KG/M2

## 2018-07-10 DIAGNOSIS — E66.9 OBESITY (BMI 30-39.9): ICD-10-CM

## 2018-07-10 DIAGNOSIS — Z51.81 THERAPEUTIC DRUG MONITORING: ICD-10-CM

## 2018-07-10 DIAGNOSIS — I10 HYPERTENSION, UNSPECIFIED TYPE: Primary | ICD-10-CM

## 2018-07-10 PROCEDURE — 99213 OFFICE O/P EST LOW 20 MIN: CPT | Performed by: NURSE PRACTITIONER

## 2018-07-10 RX ORDER — PHENTERMINE HYDROCHLORIDE 15 MG/1
15 CAPSULE ORAL EVERY MORNING
Qty: 30 CAPSULE | Refills: 0 | Status: SHIPPED | OUTPATIENT
Start: 2018-07-10 | End: 2018-08-13

## 2018-07-10 NOTE — PATIENT INSTRUCTIONS
Keep up the great work!! #14 lbs of weight loss    Plan:  Continue with medications: phentermine 15mg, metformin   Go to the lab for blood work   Follow up with me in 1 month  Schedule follow up appointments: Dee Fry (dietitian)    Please try to work o

## 2018-07-10 NOTE — PROGRESS NOTES
HISTORY OF PRESENT ILLNESS  Patient presents with:  Weight Check: 1 month f/u. Down 6 lbs. Arya Seth is a 47year old female here for follow up with medical weight loss program for the treatment of overweight, obesity, or morbid obesity.  Doreen fast food meals/week: 3-4  meals/week    Social hx and lifestyle reviewed:    Work:    Marital status:  (31 yrs) and has 2 children in 20s)--> 1 child lives at home   Support: yes  Tobacco use: quit 23 yrs ago  ETOH use: 1 drink ever tenderness  MUSCULOSKELETAL: grossly intact  NEURO: Oriented times three, full ROM of bilateral UE/LE  PSYCH: pleasant, cooperative, normal mood and affect      Lab Results  Component Value Date   WBC 10.2 09/29/2017   RBC 4.56 09/29/2017   HGB 14.0 09/29/ 30-39. 9)  Plan: Phentermine HCl 15 MG Oral Cap    Initial Weight Data and Goal Weight Loss:  Weight Calculations  Initial Weight: 213 lbs  Today's Weight: 199 lbs  5% Goal: 10.65  10% Goal: 21.3  Total Weight Loss: 14 lbs     Initial consult:  213 lbs on 4 ordered today: leptin, a1c, vitamin d (hasnt' done labs that were ordered with last visit)     Patient Instructions   Keep up the great work!! #14 lbs of weight loss    Plan:  Continue with medications: phentermine 15mg, metformin   Go to the lab for blood

## 2018-07-16 ENCOUNTER — PATIENT MESSAGE (OUTPATIENT)
Dept: FAMILY MEDICINE CLINIC | Facility: CLINIC | Age: 53
End: 2018-07-16

## 2018-07-16 RX ORDER — IBUPROFEN AND FAMOTIDINE 26.6; 8 MG/1; MG/1
1 TABLET, FILM COATED ORAL 3 TIMES DAILY PRN
Qty: 90 TABLET | Refills: 5 | Status: SHIPPED | OUTPATIENT
Start: 2018-07-16 | End: 2018-07-17

## 2018-07-16 NOTE — TELEPHONE ENCOUNTER
From: Malia Jimenes  Sent: 7/15/2018 9:16 PM CDT  Subject: Medication Renewal Request    Malia Jimenes would like a refill of the following medications:     DUEXIS 800-26.6 MG Oral Tab Susan Monday, MD]    Preferred pharmacy: [place] [id number]

## 2018-07-17 ENCOUNTER — APPOINTMENT (OUTPATIENT)
Dept: LAB | Age: 53
End: 2018-07-17
Attending: NURSE PRACTITIONER
Payer: COMMERCIAL

## 2018-07-17 DIAGNOSIS — E66.9 OBESITY (BMI 30-39.9): ICD-10-CM

## 2018-07-17 DIAGNOSIS — Z51.81 THERAPEUTIC DRUG MONITORING: ICD-10-CM

## 2018-07-17 LAB
ALBUMIN SERPL-MCNC: 3.8 G/DL (ref 3.5–4.8)
ALP LIVER SERPL-CCNC: 75 U/L (ref 41–108)
ALT SERPL-CCNC: 37 U/L (ref 14–54)
AST SERPL-CCNC: 35 U/L (ref 15–41)
BILIRUB SERPL-MCNC: 0.9 MG/DL (ref 0.1–2)
BUN BLD-MCNC: 16 MG/DL (ref 8–20)
CALCIUM BLD-MCNC: 9.1 MG/DL (ref 8.3–10.3)
CHLORIDE: 106 MMOL/L (ref 101–111)
CHOLEST SMN-MCNC: 178 MG/DL (ref ?–200)
CO2: 27 MMOL/L (ref 22–32)
CREAT BLD-MCNC: 0.85 MG/DL (ref 0.55–1.02)
EST. AVERAGE GLUCOSE BLD GHB EST-MCNC: 103 MG/DL (ref 68–126)
GLUCOSE BLD-MCNC: 94 MG/DL (ref 70–99)
HAV AB SERPL IA-ACNC: 449 PG/ML (ref 193–986)
HBA1C MFR BLD HPLC: 5.2 % (ref ?–5.7)
HDLC SERPL-MCNC: 50 MG/DL (ref 45–?)
HDLC SERPL: 3.56 {RATIO} (ref ?–4.44)
LDLC SERPL CALC-MCNC: 99 MG/DL (ref ?–130)
M PROTEIN MFR SERPL ELPH: 7.3 G/DL (ref 6.1–8.3)
NONHDLC SERPL-MCNC: 128 MG/DL (ref ?–130)
POTASSIUM SERPL-SCNC: 3.4 MMOL/L (ref 3.6–5.1)
SODIUM SERPL-SCNC: 142 MMOL/L (ref 136–144)
TRIGL SERPL-MCNC: 145 MG/DL (ref ?–150)
TSI SER-ACNC: 1.99 MIU/ML (ref 0.35–5.5)
VLDLC SERPL CALC-MCNC: 29 MG/DL (ref 5–40)

## 2018-07-17 PROCEDURE — 80061 LIPID PANEL: CPT | Performed by: NURSE PRACTITIONER

## 2018-07-17 PROCEDURE — 83036 HEMOGLOBIN GLYCOSYLATED A1C: CPT | Performed by: NURSE PRACTITIONER

## 2018-07-17 PROCEDURE — 36415 COLL VENOUS BLD VENIPUNCTURE: CPT | Performed by: NURSE PRACTITIONER

## 2018-07-17 PROCEDURE — 82397 CHEMILUMINESCENT ASSAY: CPT | Performed by: NURSE PRACTITIONER

## 2018-07-17 PROCEDURE — 80053 COMPREHEN METABOLIC PANEL: CPT | Performed by: NURSE PRACTITIONER

## 2018-07-17 PROCEDURE — 82607 VITAMIN B-12: CPT | Performed by: NURSE PRACTITIONER

## 2018-07-17 PROCEDURE — 84443 ASSAY THYROID STIM HORMONE: CPT | Performed by: NURSE PRACTITIONER

## 2018-07-17 RX ORDER — IBUPROFEN AND FAMOTIDINE 26.6; 8 MG/1; MG/1
1 TABLET, FILM COATED ORAL 3 TIMES DAILY PRN
Qty: 90 TABLET | Refills: 5 | Status: SHIPPED | OUTPATIENT
Start: 2018-07-17 | End: 2019-03-13

## 2018-07-17 NOTE — TELEPHONE ENCOUNTER
Ok to send the RX to [facility]? It should be a very minimal copay and will be shipped to [facility] home.

## 2018-07-17 NOTE — TELEPHONE ENCOUNTER
From: Pari Garcia  To: Mary Leone MD  Sent: 7/16/2018 1:52 PM CDT  Subject: Prescription Question    Hi I went to [facility] and my prescription for Duexis has went up astronomically. $87 for 15 pills.  Can you please prescribe an alternate, or gi

## 2018-07-22 LAB — LEPTIN: 6.9 NG/ML

## 2018-08-13 ENCOUNTER — OFFICE VISIT (OUTPATIENT)
Dept: INTERNAL MEDICINE CLINIC | Facility: CLINIC | Age: 53
End: 2018-08-13

## 2018-08-13 VITALS
DIASTOLIC BLOOD PRESSURE: 88 MMHG | HEIGHT: 66.5 IN | BODY MASS INDEX: 30.65 KG/M2 | HEART RATE: 80 BPM | SYSTOLIC BLOOD PRESSURE: 148 MMHG | WEIGHT: 193 LBS | RESPIRATION RATE: 16 BRPM

## 2018-08-13 DIAGNOSIS — Z51.81 THERAPEUTIC DRUG MONITORING: Primary | ICD-10-CM

## 2018-08-13 DIAGNOSIS — E66.9 OBESITY (BMI 30-39.9): ICD-10-CM

## 2018-08-13 DIAGNOSIS — I10 HYPERTENSION, UNSPECIFIED TYPE: ICD-10-CM

## 2018-08-13 PROCEDURE — 99213 OFFICE O/P EST LOW 20 MIN: CPT | Performed by: NURSE PRACTITIONER

## 2018-08-13 RX ORDER — METFORMIN HYDROCHLORIDE 750 MG/1
750 TABLET, EXTENDED RELEASE ORAL DAILY
Qty: 90 TABLET | Refills: 1 | Status: SHIPPED | OUTPATIENT
Start: 2018-08-13 | End: 2019-01-29

## 2018-08-13 RX ORDER — PHENTERMINE HYDROCHLORIDE 15 MG/1
15 CAPSULE ORAL EVERY MORNING
Qty: 30 CAPSULE | Refills: 1 | Status: SHIPPED | OUTPATIENT
Start: 2018-08-13 | End: 2018-10-16

## 2018-08-13 NOTE — PATIENT INSTRUCTIONS
Plan:  Continue with medications: phentermine 15mg and Metformin 750mg (1 tablet per day)   Follow up with me in 1-2 month  Schedule follow up appointments: Tiffany Montero (dietitian)    Please try to work on the following dietary changes:  1.  Drink lots of w

## 2018-08-13 NOTE — PROGRESS NOTES
HISTORY OF PRESENT ILLNESS  Patient presents with:  Weight Check: lost 6 pounds     Stacia Hicks is a 47year old female here for follow up with medical weight loss program for the treatment of overweight, obesity, or morbid obesity.  Patient has lost meals/week: 3-4  meals/week    Social hx and lifestyle reviewed:    Work:    Marital status:  (31 yrs) and has 2 children in 20s)--> 1 child lives at home   Support: yes  Tobacco use: quit 23 yrs ago  ETOH use: 1 drink every 2-3 maximilian tenderness  MUSCULOSKELETAL: grossly intact  NEURO: Oriented times three, full ROM of bilateral UE/LE  PSYCH: pleasant, cooperative, normal mood and affect      Lab Results  Component Value Date   WBC 10.2 09/29/2017   RBC 4.56 09/29/2017   HGB 14.0 09/29/ Oral Tablet 24 Hr    (I10) Hypertension, unspecified type    Initial Weight Data and Goal Weight Loss:  Weight Calculations  Initial Weight: 213 lbs  Initial Weight Date: 04/16/18  Today's Weight: 193 lbs  5% Goal: 10.65  10% Goal: 21.3  Total Weight Loss: and stress in weight management    Labs ordered today: leptin, a1c, vitamin d (hasnt' done labs that were ordered with last visit)     Patient Instructions   Plan:  Continue with medications: phentermine 15mg and Metformin 750mg (1 tablet per day)   Follow

## 2018-10-16 ENCOUNTER — OFFICE VISIT (OUTPATIENT)
Dept: INTERNAL MEDICINE CLINIC | Facility: CLINIC | Age: 53
End: 2018-10-16
Payer: COMMERCIAL

## 2018-10-16 VITALS
DIASTOLIC BLOOD PRESSURE: 78 MMHG | RESPIRATION RATE: 16 BRPM | WEIGHT: 191 LBS | SYSTOLIC BLOOD PRESSURE: 120 MMHG | HEIGHT: 66.5 IN | BODY MASS INDEX: 30.34 KG/M2 | HEART RATE: 70 BPM

## 2018-10-16 DIAGNOSIS — E66.9 OBESITY (BMI 30-39.9): ICD-10-CM

## 2018-10-16 DIAGNOSIS — Z51.81 THERAPEUTIC DRUG MONITORING: Primary | ICD-10-CM

## 2018-10-16 DIAGNOSIS — I10 HYPERTENSION, UNSPECIFIED TYPE: ICD-10-CM

## 2018-10-16 PROCEDURE — 99214 OFFICE O/P EST MOD 30 MIN: CPT | Performed by: NURSE PRACTITIONER

## 2018-10-16 RX ORDER — PHENTERMINE HYDROCHLORIDE 15 MG/1
15 CAPSULE ORAL EVERY MORNING
Qty: 30 CAPSULE | Refills: 1 | Status: SHIPPED | OUTPATIENT
Start: 2018-10-16 | End: 2018-12-12

## 2018-10-16 NOTE — PROGRESS NOTES
HISTORY OF PRESENT ILLNESS  Patient presents with:  Weight Check: down 2 lbs     Marty Shine is a 47year old female here for follow up with medical weight loss program for the treatment of overweight, obesity, or morbid obesity.  Patient has lost -# (bottle water)   Soda: 2 diet coke per week (used to do 2 per day)  Juice: 1-2 glasses per week  Coffee: 1 pot before work (non-diary creamer)       Sweet/ Salty tooth: both  Portion: medium/large  Eats 3 meals per day: yes   Number of restaurant or fast f in all fields, breathing non labored  CARDIO: RRR without murmur, normal S1 and S2 without clicks or gallops, no pedal edema.    GI: rotund, no masses, HSM or tenderness  MUSCULOSKELETAL: grossly intact  NEURO: Oriented times three, full ROM of bilateral UE prior to visit. ASSESSMENT/PLAN  (Z51.81) Therapeutic drug monitoring  (primary encounter diagnosis)  Plan: Phentermine HCl 15 MG Oral Cap    (E66.9) Obesity (BMI 30-39. 9)  Plan: Phentermine HCl 15 MG Oral Cap    (I10) Hypertension, unspecified type water per day  - Do not drink your calories (no soda, juice, high calorie coffee drinks, limit alcohol)  - Do not eat late at night  · FITTE: ACSM recommendations (150-300 minutes/ week in active weight loss)   · Discussed the role of sleep and stress in w it will break down your macros for the day (ie. Protein, carbs, fibers, sugars and fats). Try to stay within these numbers daily     2. \"7 minute workout\" to help with exercise/activity which takes 7 minutes of your day and that you can do at home!    3.

## 2018-10-25 ENCOUNTER — OFFICE VISIT (OUTPATIENT)
Dept: INTERNAL MEDICINE CLINIC | Facility: CLINIC | Age: 53
End: 2018-10-25
Payer: COMMERCIAL

## 2018-10-25 VITALS — HEIGHT: 66.5 IN | BODY MASS INDEX: 30.23 KG/M2 | WEIGHT: 190.38 LBS

## 2018-10-25 DIAGNOSIS — E66.09 CLASS 1 OBESITY DUE TO EXCESS CALORIES WITH BODY MASS INDEX (BMI) OF 30.0 TO 30.9 IN ADULT, UNSPECIFIED WHETHER SERIOUS COMORBIDITY PRESENT: Primary | ICD-10-CM

## 2018-10-25 PROCEDURE — 97802 MEDICAL NUTRITION INDIV IN: CPT | Performed by: DIETITIAN, REGISTERED

## 2018-10-25 NOTE — PROGRESS NOTES
INITIAL OUTPATIENT NUTRITION CONSULTATION    Nutrition Assessment    Medical Diagnosis: Obesity    Physical Findings: none reported    Client Age and Gender: 47year old    Marital Status and Occupation: , 2 adult children (1 at home), works as risk  35-45  mg/dL  Moderate CHD risk  45-60  mg/dL  Average CHD risk  60-75  mg/dL  Below Average CHD risk       AST   Date Value Ref Range Status   07/17/2018 35 15 - 41 U/L Final   06/23/2014 31 15 - 41 U/L Final     ALT   Date Value Ref Range Status loss    Physical Activity: 4 - 5 hrs/week   She takes an exercise class 2 - 3x/week and sees her  1 - 2x/week.    She expresses concern about muscle gain - we discussed that preservation of lean muscle mass during weight loss is beneficial but that s

## 2018-12-12 DIAGNOSIS — Z51.81 THERAPEUTIC DRUG MONITORING: ICD-10-CM

## 2018-12-12 DIAGNOSIS — E66.9 OBESITY (BMI 30-39.9): ICD-10-CM

## 2018-12-12 RX ORDER — PHENTERMINE HYDROCHLORIDE 15 MG/1
CAPSULE ORAL
Qty: 30 CAPSULE | Refills: 0 | Status: SHIPPED | OUTPATIENT
Start: 2018-12-12 | End: 2019-01-29

## 2018-12-12 NOTE — TELEPHONE ENCOUNTER
Requesting phentermine  LOV: 10/16  RTC: 2 months  Filled: 10/16 #30 with 1 refills    Future Appointments   Date Time Provider Nabor Meyer   12/20/2018  1:00 PM DIONNE Ambrocio [facility]   1/7/2019  2:00 PM Alejandra Cotton MD E

## 2018-12-12 NOTE — TELEPHONE ENCOUNTER
If we do this, I can only refill for #30 tablets, please call patient and notify her of this. thanks

## 2019-01-07 ENCOUNTER — OFFICE VISIT (OUTPATIENT)
Dept: OBGYN CLINIC | Facility: CLINIC | Age: 54
End: 2019-01-07
Payer: COMMERCIAL

## 2019-01-07 VITALS
WEIGHT: 189 LBS | HEIGHT: 66.5 IN | BODY MASS INDEX: 30.02 KG/M2 | DIASTOLIC BLOOD PRESSURE: 88 MMHG | SYSTOLIC BLOOD PRESSURE: 140 MMHG

## 2019-01-07 DIAGNOSIS — Z01.419 WELL FEMALE EXAM WITH ROUTINE GYNECOLOGICAL EXAM: Primary | ICD-10-CM

## 2019-01-07 DIAGNOSIS — Z12.39 BREAST CANCER SCREENING: ICD-10-CM

## 2019-01-07 DIAGNOSIS — Z12.4 CERVICAL CANCER SCREENING: ICD-10-CM

## 2019-01-07 DIAGNOSIS — N92.6 IRREGULAR UTERINE BLEEDING: ICD-10-CM

## 2019-01-07 PROCEDURE — 88175 CYTOPATH C/V AUTO FLUID REDO: CPT | Performed by: OBSTETRICS & GYNECOLOGY

## 2019-01-07 PROCEDURE — 87624 HPV HI-RISK TYP POOLED RSLT: CPT | Performed by: OBSTETRICS & GYNECOLOGY

## 2019-01-07 PROCEDURE — 99386 PREV VISIT NEW AGE 40-64: CPT | Performed by: OBSTETRICS & GYNECOLOGY

## 2019-01-07 NOTE — PROGRESS NOTES
GYN H&P     2019  2:38 PM    CC: Patient is here for annual    HPI: patient is a 47year old  here for her annual gyn exam.   She has complaints pf ablation 5 years ago and now recent spotting. Had not had menses that whole time.  Concerned re: m Oral Tab TAKE 1 TABLET(25 MG) BY MOUTH EVERY DAY Disp: 90 tablet Rfl: 3     No current facility-administered medications on file prior to visit.    Family History   Problem Relation Age of Onset   • Other (Other) Father    • Other (alchoholism) Father    • denies chest pain, palpitations, exercise intolerance   GI: denies abdominal pain, diarrhea, constipation  : no complaints, denies dysuria, increased urinary frequency.  Menses had stopped now irregular spotting, no dysmenorrhea, no menorrhagia, no dyspar gynecological exam    2. Cervical cancer screening    3. Breast cancer screening    4. Irregular uterine bleeding        Patient counseled on diet/exercise       1.  Well female exam with routine gynecological exam    - [facility] MEGHAN 2D+3D SCREENING BILAT (CPT=7

## 2019-01-08 LAB — HPV I/H RISK 1 DNA SPEC QL NAA+PROBE: NEGATIVE

## 2019-01-14 RX ORDER — IBUPROFEN AND FAMOTIDINE 800; 26.6 MG/1; MG/1
TABLET, COATED ORAL
Qty: 90 TABLET | Refills: 0 | Status: SHIPPED | OUTPATIENT
Start: 2019-01-14 | End: 2019-05-22

## 2019-01-14 NOTE — TELEPHONE ENCOUNTER
Medication(s) to Refill:   Requested Prescriptions     Pending Prescriptions Disp Refills   • DUEXIS 800-26.6 MG Oral Tab [Pharmacy Med Name: DUEXIS 800-26. 6MG TABLETS] 90 tablet 0     Sig: TAKE 1 TABLET BY MOUTH THREE TIMES DAILY AS NEEDED         Reason

## 2019-01-22 DIAGNOSIS — E66.9 OBESITY (BMI 30-39.9): ICD-10-CM

## 2019-01-22 DIAGNOSIS — Z51.81 THERAPEUTIC DRUG MONITORING: ICD-10-CM

## 2019-01-22 RX ORDER — PHENTERMINE HYDROCHLORIDE 15 MG/1
CAPSULE ORAL
Qty: 30 CAPSULE | Refills: 0 | Status: CANCELLED | OUTPATIENT
Start: 2019-01-22

## 2019-01-23 NOTE — TELEPHONE ENCOUNTER
Requesting Phentermine  LOV: 10/16/18  RTC: 2 months  Last Relevant Labs: n/a  Filled: 12/12/18 #30 with 0 refills    Future Appointments   Date Time Provider Nabor Meyer   2/7/2019  9:30 AM TAMIKO [facility] [place]   2/7/2019 10:00 AM TAMIKO

## 2019-01-29 ENCOUNTER — OFFICE VISIT (OUTPATIENT)
Dept: INTERNAL MEDICINE CLINIC | Facility: CLINIC | Age: 54
End: 2019-01-29
Payer: COMMERCIAL

## 2019-01-29 VITALS
RESPIRATION RATE: 16 BRPM | DIASTOLIC BLOOD PRESSURE: 80 MMHG | HEART RATE: 72 BPM | HEIGHT: 66.5 IN | BODY MASS INDEX: 30.97 KG/M2 | WEIGHT: 195 LBS | SYSTOLIC BLOOD PRESSURE: 128 MMHG

## 2019-01-29 DIAGNOSIS — Z51.81 THERAPEUTIC DRUG MONITORING: Primary | ICD-10-CM

## 2019-01-29 DIAGNOSIS — I10 HYPERTENSION, UNSPECIFIED TYPE: ICD-10-CM

## 2019-01-29 DIAGNOSIS — E66.09 CLASS 1 OBESITY DUE TO EXCESS CALORIES WITH BODY MASS INDEX (BMI) OF 30.0 TO 30.9 IN ADULT, UNSPECIFIED WHETHER SERIOUS COMORBIDITY PRESENT: ICD-10-CM

## 2019-01-29 PROBLEM — E66.811 CLASS 1 OBESITY DUE TO EXCESS CALORIES WITH BODY MASS INDEX (BMI) OF 30.0 TO 30.9 IN ADULT: Status: ACTIVE | Noted: 2019-01-29

## 2019-01-29 PROCEDURE — 99214 OFFICE O/P EST MOD 30 MIN: CPT | Performed by: NURSE PRACTITIONER

## 2019-01-29 RX ORDER — PHENTERMINE HYDROCHLORIDE 15 MG/1
CAPSULE ORAL
Qty: 30 CAPSULE | Refills: 0 | Status: CANCELLED | OUTPATIENT
Start: 2019-01-29

## 2019-01-29 RX ORDER — METFORMIN HYDROCHLORIDE 750 MG/1
750 TABLET, EXTENDED RELEASE ORAL 2 TIMES DAILY WITH MEALS
Qty: 90 TABLET | Refills: 1 | Status: SHIPPED | OUTPATIENT
Start: 2019-01-29 | End: 2019-03-13

## 2019-01-29 RX ORDER — PHENTERMINE HYDROCHLORIDE 37.5 MG/1
37.5 TABLET ORAL
Qty: 30 TABLET | Refills: 0 | Status: SHIPPED | OUTPATIENT
Start: 2019-01-29 | End: 2019-03-13

## 2019-01-29 NOTE — PATIENT INSTRUCTIONS
PLAN:  Continue with medications: phentermine 37.5mg (1 tablet per day in the AM) and increase metformin 1 tablet in the morning and 1 tablet before dinner.    Follow up with me in 1 month  Schedule follow up appointments:  Hilda Cosme \"Headspace\" which helps with mindfulness, meditation, clarity, sleep, and amena to your daily life.

## 2019-01-29 NOTE — PROGRESS NOTES
HISTORY OF PRESENT ILLNESS  Patient presents with:  Weight Check: up for 4# - has been without med 2 weeks     Asiya Vaughn is a 47year old female here for follow up with medical weight loss program for the treatment of overweight, obesity, or morbi day)  Juice: 1-2 glasses per week  Coffee: 1 pot before work (non-diary creamer)       Sweet/ Salty tooth: both  Portion: medium/large  Eats 3 meals per day: yes   Number of restaurant or fast food meals/week: 3-4  meals/week    Social hx and lifestyle rev RDW 12.7 09/29/2017    .0 09/29/2017    MPV 10.8 02/20/2013     Lab Results   Component Value Date    GLU 94 07/17/2018    BUN 16 07/17/2018    CREATSERUM 0.85 07/17/2018    GFR 82 09/29/2017    GFRNAA 80 07/17/2018    GFRAA 92 07/17/2018    CA 9.1 lbs     Initial consult:  213 lbs on 4/16/2018, gained  #4 Lb: 18 lbs total     Reviewed    Counseled on comprehensive weight loss plan including attention to nutrition, exercise and behavior/stress management for success.  Discussed first goal of weight lo Dietitian/nutritionist      Please try to work on the following dietary changes:  1. Drink lots of water and cut down on soda/juice consumption if soda/juice drinker  2. Eat breakfast daily (within 1 hour)  3.  Focus on protein: (15-30 grams with each meal) APN

## 2019-02-07 ENCOUNTER — HOSPITAL ENCOUNTER (OUTPATIENT)
Dept: ULTRASOUND IMAGING | Age: 54
Discharge: HOME | End: 2019-02-07
Attending: OBSTETRICS & GYNECOLOGY
Payer: COMMERCIAL

## 2019-02-07 ENCOUNTER — LAB ENCOUNTER (OUTPATIENT)
Dept: LAB | Age: 54
End: 2019-02-07
Attending: OBSTETRICS & GYNECOLOGY
Payer: COMMERCIAL

## 2019-02-07 ENCOUNTER — HOSPITAL ENCOUNTER (OUTPATIENT)
Dept: MAMMOGRAPHY | Age: 54
Discharge: HOME | End: 2019-02-07
Attending: OBSTETRICS & GYNECOLOGY
Payer: COMMERCIAL

## 2019-02-07 DIAGNOSIS — N92.6 IRREGULAR UTERINE BLEEDING: ICD-10-CM

## 2019-02-07 DIAGNOSIS — Z12.39 BREAST CANCER SCREENING: ICD-10-CM

## 2019-02-07 DIAGNOSIS — Z01.419 WELL FEMALE EXAM WITH ROUTINE GYNECOLOGICAL EXAM: ICD-10-CM

## 2019-02-07 LAB
ESTRADIOL: 11.5 PG/ML
FSH SERPL-ACNC: 88.4 MIU/ML
LH: 26.3 MIU/ML

## 2019-02-07 PROCEDURE — 76830 TRANSVAGINAL US NON-OB: CPT | Performed by: OBSTETRICS & GYNECOLOGY

## 2019-02-07 PROCEDURE — 82670 ASSAY OF TOTAL ESTRADIOL: CPT

## 2019-02-07 PROCEDURE — 83002 ASSAY OF GONADOTROPIN (LH): CPT

## 2019-02-07 PROCEDURE — 77067 SCR MAMMO BI INCL CAD: CPT | Performed by: OBSTETRICS & GYNECOLOGY

## 2019-02-07 PROCEDURE — 76856 US EXAM PELVIC COMPLETE: CPT | Performed by: OBSTETRICS & GYNECOLOGY

## 2019-02-07 PROCEDURE — 77063 BREAST TOMOSYNTHESIS BI: CPT | Performed by: OBSTETRICS & GYNECOLOGY

## 2019-02-07 PROCEDURE — 36415 COLL VENOUS BLD VENIPUNCTURE: CPT

## 2019-02-07 PROCEDURE — 83001 ASSAY OF GONADOTROPIN (FSH): CPT

## 2019-03-08 RX ORDER — PHENTERMINE HYDROCHLORIDE 37.5 MG/1
37.5 TABLET ORAL
Qty: 30 TABLET | Refills: 0 | OUTPATIENT
Start: 2019-03-08

## 2019-03-08 NOTE — TELEPHONE ENCOUNTER
Requesting Phentermine  LOV: 1/29/19  RTC: one month  Last Relevant Labs: n/a  Filled: 1/29/19 #30 with 0 refills    Future Appointments   Date Time Provider Nabor Meyer   3/13/2019  2:40 PM DIONNE Rodriguez [facility]     Denied ref

## 2019-03-13 ENCOUNTER — OFFICE VISIT (OUTPATIENT)
Dept: INTERNAL MEDICINE CLINIC | Facility: CLINIC | Age: 54
End: 2019-03-13
Payer: COMMERCIAL

## 2019-03-13 VITALS
HEIGHT: 66.5 IN | DIASTOLIC BLOOD PRESSURE: 78 MMHG | HEART RATE: 84 BPM | WEIGHT: 189 LBS | SYSTOLIC BLOOD PRESSURE: 118 MMHG | BODY MASS INDEX: 30.02 KG/M2 | RESPIRATION RATE: 16 BRPM

## 2019-03-13 DIAGNOSIS — E66.9 OBESITY (BMI 30-39.9): ICD-10-CM

## 2019-03-13 DIAGNOSIS — Z51.81 ENCOUNTER FOR THERAPEUTIC DRUG MONITORING: Primary | ICD-10-CM

## 2019-03-13 DIAGNOSIS — Z71.3 DIETARY COUNSELING: ICD-10-CM

## 2019-03-13 DIAGNOSIS — I10 HYPERTENSION, UNSPECIFIED TYPE: ICD-10-CM

## 2019-03-13 PROCEDURE — 99213 OFFICE O/P EST LOW 20 MIN: CPT | Performed by: NURSE PRACTITIONER

## 2019-03-13 PROCEDURE — 99401 PREV MED CNSL INDIV APPRX 15: CPT | Performed by: NURSE PRACTITIONER

## 2019-03-13 RX ORDER — PHENTERMINE HYDROCHLORIDE 37.5 MG/1
37.5 TABLET ORAL
Qty: 30 TABLET | Refills: 1 | Status: SHIPPED | OUTPATIENT
Start: 2019-03-13 | End: 2019-06-03

## 2019-03-13 RX ORDER — METFORMIN HYDROCHLORIDE 750 MG/1
750 TABLET, EXTENDED RELEASE ORAL 2 TIMES DAILY WITH MEALS
Qty: 180 TABLET | Refills: 0 | Status: SHIPPED | OUTPATIENT
Start: 2019-03-13 | End: 2019-06-03

## 2019-03-13 NOTE — PROGRESS NOTES
[facility] for Diet and Exercise     /78   Pulse 84   Resp 16   Ht 66.5\"   Wt 189 lb   BMI 30.05 kg/m²   Body mass index is 30.05 kg/m².     Wt Readings from Last 6 Encounters:  03/13/19 : 189 lb  01/29/19 : 195 lb  01/07/19 : 189 lb  10

## 2019-03-13 NOTE — PROGRESS NOTES
HISTORY OF PRESENT ILLNESS  Patient presents with:  Weight Check: lost 6 pounds     Cuauhtemoc Hand is a 47year old female here for follow up with medical weight loss program for the treatment of overweight, obesity, or morbid obesity.  Patient has lost 7-8/10  Sleep hours and integrity:  8 Hours per night    MEDICAL HISTORY  PMH reviewed:   Cardiac disorders: htn  Diabetes: negative  Thyroid disease: negative  Renal disease: negative   Kidney stones: negative  Liver disease: negative  Joint-related condi 07/17/2018    A1C 5.2 07/17/2018     Lab Results   Component Value Date    CHOLEST 178 07/17/2018    TRIG 145 07/17/2018    HDL 50 07/17/2018    LDL 99 07/17/2018    VLDL 29 07/17/2018    TCHDLRATIO 3.56 07/17/2018    [place] 128 07/17/2018     Lab Results exercise video with dequan   Get back on track  Verbal food log done    Medication:  · Medication use and side effects reviewed with patient.    · Will continue Metformin 750mg BID  · Will continue phentermine 37.5mg   Contraindications: none    Discussed: instead choose whole grain options or more protein or vegetables (4-6 servings of vegetables per day)  6. Get a good night of sleep  7. Try to decrease stress in life     Please download apps:  1.  \"My Fitness Pal\" (other option is Lose it)) to help you t

## 2019-03-13 NOTE — PATIENT INSTRUCTIONS
Keep up the great work! !     PLAN:  Continue with medications: phentermine, metformin   Follow up with me in 1 month  Schedule follow up appointments:  Dietitian/nutritionist      Please try to work on the following dietary changes:  1.  Drink lots of water your daily life.

## 2019-04-22 ENCOUNTER — TELEPHONE (OUTPATIENT)
Dept: FAMILY MEDICINE CLINIC | Facility: CLINIC | Age: 54
End: 2019-04-22

## 2019-04-22 DIAGNOSIS — I10 HYPERTENSION, UNSPECIFIED TYPE: ICD-10-CM

## 2019-04-23 RX ORDER — HYDROCHLOROTHIAZIDE 25 MG/1
TABLET ORAL
Qty: 30 TABLET | Refills: 0 | Status: SHIPPED | OUTPATIENT
Start: 2019-04-23 | End: 2019-05-22

## 2019-04-23 NOTE — TELEPHONE ENCOUNTER
Follica message sent. We are only approving 30 days to local at this time. (see below)  Pt can not get mail order RXs until she is seen. LOV was over a year ago.

## 2019-04-23 NOTE — TELEPHONE ENCOUNTER
Spoke with patient she is requesting [facility]. Patient is scheduled for 5/23/19 for her follow up.

## 2019-05-04 ENCOUNTER — MED REC SCAN ONLY (OUTPATIENT)
Dept: FAMILY MEDICINE CLINIC | Facility: CLINIC | Age: 54
End: 2019-05-04

## 2019-05-16 DIAGNOSIS — Z51.81 ENCOUNTER FOR THERAPEUTIC DRUG MONITORING: ICD-10-CM

## 2019-05-16 NOTE — TELEPHONE ENCOUNTER
Requesting Phentermine  LOV: 3/13/19  RTC: one month  Last Relevant Labs: n/a  Filled: 3/13/19 #30 with 1 refills    Future Appointments   Date Time Provider Nabor Meyer   5/23/2019  2:00 PM Silver Hoyt MD [facility] [place]   6/19/2019  3:00 PM Z

## 2019-05-22 ENCOUNTER — OFFICE VISIT (OUTPATIENT)
Dept: FAMILY MEDICINE CLINIC | Facility: CLINIC | Age: 54
End: 2019-05-22
Payer: COMMERCIAL

## 2019-05-22 VITALS
HEIGHT: 66.5 IN | HEART RATE: 76 BPM | SYSTOLIC BLOOD PRESSURE: 100 MMHG | OXYGEN SATURATION: 98 % | WEIGHT: 190 LBS | BODY MASS INDEX: 30.17 KG/M2 | TEMPERATURE: 99 F | RESPIRATION RATE: 16 BRPM | DIASTOLIC BLOOD PRESSURE: 70 MMHG

## 2019-05-22 DIAGNOSIS — I10 HYPERTENSION, UNSPECIFIED TYPE: ICD-10-CM

## 2019-05-22 DIAGNOSIS — J01.10 ACUTE NON-RECURRENT FRONTAL SINUSITIS: Primary | ICD-10-CM

## 2019-05-22 DIAGNOSIS — M79.672 LEFT FOOT PAIN: ICD-10-CM

## 2019-05-22 DIAGNOSIS — G89.4 CHRONIC PAIN SYNDROME: ICD-10-CM

## 2019-05-22 PROCEDURE — 99214 OFFICE O/P EST MOD 30 MIN: CPT | Performed by: NURSE PRACTITIONER

## 2019-05-22 RX ORDER — FLUTICASONE PROPIONATE 50 MCG
2 SPRAY, SUSPENSION (ML) NASAL DAILY
Qty: 3 BOTTLE | Refills: 3 | Status: SHIPPED | OUTPATIENT
Start: 2019-05-22 | End: 2020-02-03

## 2019-05-22 RX ORDER — FLUTICASONE PROPIONATE 50 MCG
2 SPRAY, SUSPENSION (ML) NASAL DAILY
Qty: 3 BOTTLE | Refills: 3 | Status: SHIPPED | OUTPATIENT
Start: 2019-05-22 | End: 2019-05-22

## 2019-05-22 RX ORDER — PHENTERMINE HYDROCHLORIDE 37.5 MG/1
37.5 TABLET ORAL
Qty: 30 TABLET | Refills: 1 | OUTPATIENT
Start: 2019-05-22

## 2019-05-22 RX ORDER — PREDNISONE 20 MG/1
40 TABLET ORAL DAILY
Qty: 10 TABLET | Refills: 0 | Status: SHIPPED | OUTPATIENT
Start: 2019-05-22 | End: 2019-05-27

## 2019-05-22 RX ORDER — MELOXICAM 15 MG/1
TABLET ORAL
COMMUNITY
Start: 2019-04-18 | End: 2019-05-22

## 2019-05-22 RX ORDER — MONTELUKAST SODIUM 10 MG/1
10 TABLET ORAL NIGHTLY
Qty: 30 TABLET | Refills: 2 | Status: SHIPPED | OUTPATIENT
Start: 2019-05-22 | End: 2019-06-03

## 2019-05-22 RX ORDER — IBUPROFEN AND FAMOTIDINE 26.6; 8 MG/1; MG/1
1 TABLET, FILM COATED ORAL 3 TIMES DAILY PRN
Qty: 90 TABLET | Refills: 0 | Status: SHIPPED | OUTPATIENT
Start: 2019-05-22 | End: 2019-06-21

## 2019-05-22 RX ORDER — HYDROCHLOROTHIAZIDE 25 MG/1
TABLET ORAL
Qty: 90 TABLET | Refills: 0 | Status: SHIPPED | OUTPATIENT
Start: 2019-05-22 | End: 2020-08-27

## 2019-05-22 RX ORDER — DOXYCYCLINE HYCLATE 100 MG
100 TABLET ORAL 2 TIMES DAILY
Qty: 14 TABLET | Refills: 0 | Status: SHIPPED | OUTPATIENT
Start: 2019-05-22 | End: 2019-05-22

## 2019-05-22 RX ORDER — DOXYCYCLINE HYCLATE 100 MG
100 TABLET ORAL 2 TIMES DAILY
Qty: 14 TABLET | Refills: 0 | Status: SHIPPED | OUTPATIENT
Start: 2019-05-22 | End: 2019-05-29

## 2019-05-22 RX ORDER — PREDNISONE 20 MG/1
40 TABLET ORAL DAILY
Qty: 10 TABLET | Refills: 0 | Status: SHIPPED | OUTPATIENT
Start: 2019-05-22 | End: 2019-05-22

## 2019-05-22 NOTE — PROGRESS NOTES
Amari Leone is a 47year old female. Patient presents with:  Medication Follow-Up: refill medication    HPI:    Patient is 47 y female presents with medication follow. Needs refills and also have sinus problems .  Symptoms started   1 month ago chest pain or SOB. GI: denies nausea, vomiting.   PSYCHE: no symptoms of depression or anxiety      PE:  /70   Pulse 76   Temp 99 °F (37.2 °C) (Oral)   Resp 16   Ht 66.5\"   Wt 190 lb   SpO2 98%   BMI 30.21 kg/m²   General: WD/WN in no acute distress

## 2019-05-22 NOTE — PATIENT INSTRUCTIONS
Chronic Pain  Pain serves an important role. It lets you know something is wrong that needs your attention. When the body heals, pain normally goes away. When pain lasts longer than 6 months, it is called “chronic” pain.  This is pain that is present edilson ? Developing an exercise routine  ? Getting enough sleep   ? Stopping smoking and limiting alcohol use  ? Losing excess weight  Follow-up care  Follow up with your healthcare provider, or as advised.  Let your healthcare provider know if your current treatm

## 2019-06-03 ENCOUNTER — OFFICE VISIT (OUTPATIENT)
Dept: INTERNAL MEDICINE CLINIC | Facility: CLINIC | Age: 54
End: 2019-06-03
Payer: COMMERCIAL

## 2019-06-03 VITALS
SYSTOLIC BLOOD PRESSURE: 110 MMHG | DIASTOLIC BLOOD PRESSURE: 70 MMHG | RESPIRATION RATE: 14 BRPM | HEIGHT: 66.5 IN | BODY MASS INDEX: 29.86 KG/M2 | WEIGHT: 188 LBS | HEART RATE: 70 BPM

## 2019-06-03 DIAGNOSIS — Z51.81 ENCOUNTER FOR THERAPEUTIC DRUG MONITORING: Primary | ICD-10-CM

## 2019-06-03 DIAGNOSIS — E66.9 OBESITY (BMI 30-39.9): ICD-10-CM

## 2019-06-03 DIAGNOSIS — I10 HYPERTENSION, UNSPECIFIED TYPE: ICD-10-CM

## 2019-06-03 PROCEDURE — 99214 OFFICE O/P EST MOD 30 MIN: CPT | Performed by: NURSE PRACTITIONER

## 2019-06-03 RX ORDER — TOPIRAMATE 25 MG/1
25 TABLET ORAL 2 TIMES DAILY
Qty: 60 TABLET | Refills: 1 | Status: SHIPPED | OUTPATIENT
Start: 2019-06-03 | End: 2019-07-18

## 2019-06-03 RX ORDER — PHENTERMINE HYDROCHLORIDE 37.5 MG/1
37.5 TABLET ORAL EVERY MORNING
Qty: 30 TABLET | Refills: 0 | Status: SHIPPED | OUTPATIENT
Start: 2019-06-03 | End: 2019-07-18

## 2019-06-03 RX ORDER — METFORMIN HYDROCHLORIDE 750 MG/1
750 TABLET, EXTENDED RELEASE ORAL 2 TIMES DAILY WITH MEALS
Qty: 180 TABLET | Refills: 0 | Status: SHIPPED | OUTPATIENT
Start: 2019-06-03 | End: 2019-10-24 | Stop reason: ALTCHOICE

## 2019-06-03 NOTE — PROGRESS NOTES
Clarisa Taveras is a 47year old female presents today for follow-up on medical weight loss program for the treatment of overweight, obesity, or morbid obesity. Patient of Juana Ramirez.     S:  Current weight Wt Readings from Last 6 Encounters:  06/ PLAN:  Encounter for therapeutic drug monitoring  (primary encounter diagnosis)  Obesity (bmi 30-39. 9)  Hypertension, unspecified type    No orders of the defined types were placed in this encounter.       Meds & Refills for this Visit:  Requested Prescript 61.    According to the Healthy Women Study, the average weight gain in perimenopausal women was about five pounds; however, 20% of the population they studied gained 10 pounds or more.  Not only is the weight increase from a drop in estrogen but it’s also estrogen levels may be driven to eat more calories and store fat. Make weight gain a modifiable risk factor  So, you may be thinking—I’m destined for failure! But this isn’t true.  Although the risk of weight gain as a middle-aged woman is higher, this d easy fractures. Rest and relax. Try to relax before bed and get enough sleep in order to keep your hormones and appetite under control. Get support and learn to cope without food. Many women (and men) admit to eating under stress.  And, let’s face it, m different things can potentially influence our weight and contribute to the development of obesity, including:  • Genetics – Genes that we inherit through birth and are passed down through our families   • Stress/mood – Our mental health affects our hunger with rewards such as a vacation, a warm bath or even a nice massage. • Get enough sleep – You may not always be able to control this, but try doing what you can!  Strive for at least seven to eight hours of sleep each night by going to bed earlier, relaxi

## 2019-06-03 NOTE — PATIENT INSTRUCTIONS
Continue making lifestyle changes that focus on good nutrition, regular exercise and stress management. Medication Plan: Resume phentermine, Start phentermine at 1/2 tab daily in AM for 7 days, then increase to a full tab daily as prescribed.  Start Topa this waistline increase is different from when you were younger. An increase in visceral (abdominal) fat is linked to an increase in insulin resistance, diabetes, and inflammatory diseases.     Another factor contributing to weight gain in perimenopause may splitting your meals with a friend, ordering the lighter portion when available, or put half in the takeout box right away. Swap out dessert for fruit or yogurt. Reduce carbs.  Cut back on carbs in order to reduce the increase in belly fat, which drives m overlooked! Along the journey with weight and health, it’s also important to understand that our body weight and size is also biologically driven.  Having excess weight drives many different health risks, but weight-loss simultaneously drives many differen • Osteoarthritis   • High blood pressure   • Breathing problems (such as sleep apnea and asthma)   • Gallbladder disease and gallstones   • Some cancers  In fact, every single pound we gain or lose is significant!  Even modest weight-loss improves our ris

## 2019-06-04 ENCOUNTER — TELEPHONE (OUTPATIENT)
Dept: INTERNAL MEDICINE CLINIC | Facility: CLINIC | Age: 54
End: 2019-06-04

## 2019-06-04 NOTE — TELEPHONE ENCOUNTER
Jairon Schultz Key: [id number]    Received request for PA from [facility] on [facility] awaiting decision

## 2019-06-19 ENCOUNTER — OFFICE VISIT (OUTPATIENT)
Dept: ELECTROPHYSIOLOGY | Facility: HOSPITAL | Age: 54
End: 2019-06-19
Attending: PODIATRIST
Payer: COMMERCIAL

## 2019-06-19 DIAGNOSIS — G62.9 NEUROPATHY: Primary | ICD-10-CM

## 2019-06-19 DIAGNOSIS — M54.10 RADICULOPATHY: ICD-10-CM

## 2019-06-19 DIAGNOSIS — M54.10 RADICULOPATHY, UNSPECIFIED SPINAL REGION: ICD-10-CM

## 2019-06-19 PROCEDURE — 95909 NRV CNDJ TST 5-6 STUDIES: CPT | Performed by: OTHER

## 2019-06-20 NOTE — PROCEDURES
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 It could be partly related to residual deficit from his extensive left heel, calf  surgery.   However,  mononeuritis multiplex

## 2019-06-28 ENCOUNTER — PATIENT MESSAGE (OUTPATIENT)
Dept: FAMILY MEDICINE CLINIC | Facility: CLINIC | Age: 54
End: 2019-06-28

## 2019-06-28 DIAGNOSIS — M79.2 NEURALGIA AND NEURITIS: Primary | ICD-10-CM

## 2019-06-28 DIAGNOSIS — M79.671 RIGHT FOOT PAIN: ICD-10-CM

## 2019-06-28 DIAGNOSIS — M65.9 TENOSYNOVITIS OF LEFT ANKLE: ICD-10-CM

## 2019-06-28 DIAGNOSIS — M79.672 LEFT FOOT PAIN: ICD-10-CM

## 2019-07-01 NOTE — PROGRESS NOTES
Neuritis and neuralgia  Tenosynovitis Left ankle   Left foot pain   Right foot pain     EMG - axonal neuropathy     Refer to [facility] neuro - Dr. Sofia Pierre

## 2019-07-01 NOTE — TELEPHONE ENCOUNTER
From: Asiya Vaughn  To: Jesus Mcgee MD  Sent: 6/28/2019 8:28 PM CDT  Subject: Referral Request    Good Evening-  As you know, I have had many ongoing issues with my leg, with all the surgeries from the Achilles repair.  I went for an ENG and ultr

## 2019-07-01 NOTE — TELEPHONE ENCOUNTER
Please see message from patient. OK for neurology referral? Would you like to see her? Last several office visits here have been for acute issues. I do not see that we have seen her for her leg issue.

## 2019-07-02 NOTE — TELEPHONE ENCOUNTER
Yolanda Davenport MD     4:43 PM   Note      Neuritis and neuralgia  Tenosynovitis Left ankle   Left foot pain   Right foot pain      EMG - axonal neuropathy      Refer to [facility] neuro - Dr. Manny Leblanc

## 2019-07-18 ENCOUNTER — OFFICE VISIT (OUTPATIENT)
Dept: INTERNAL MEDICINE CLINIC | Facility: CLINIC | Age: 54
End: 2019-07-18
Payer: COMMERCIAL

## 2019-07-18 VITALS
HEART RATE: 74 BPM | SYSTOLIC BLOOD PRESSURE: 120 MMHG | DIASTOLIC BLOOD PRESSURE: 80 MMHG | BODY MASS INDEX: 29.54 KG/M2 | RESPIRATION RATE: 16 BRPM | HEIGHT: 66.5 IN | WEIGHT: 186 LBS

## 2019-07-18 DIAGNOSIS — I10 HYPERTENSION, UNSPECIFIED TYPE: ICD-10-CM

## 2019-07-18 DIAGNOSIS — Z51.81 ENCOUNTER FOR THERAPEUTIC DRUG MONITORING: Primary | ICD-10-CM

## 2019-07-18 DIAGNOSIS — E66.9 OBESITY (BMI 30-39.9): ICD-10-CM

## 2019-07-18 PROCEDURE — 99214 OFFICE O/P EST MOD 30 MIN: CPT | Performed by: NURSE PRACTITIONER

## 2019-07-18 RX ORDER — TOPIRAMATE 25 MG/1
25 TABLET ORAL 2 TIMES DAILY
Qty: 60 TABLET | Refills: 2 | Status: SHIPPED | OUTPATIENT
Start: 2019-07-18 | End: 2019-07-25

## 2019-07-18 RX ORDER — PHENTERMINE HYDROCHLORIDE 37.5 MG/1
37.5 TABLET ORAL EVERY MORNING
Qty: 30 TABLET | Refills: 1 | Status: SHIPPED | OUTPATIENT
Start: 2019-07-18 | End: 2019-09-24

## 2019-07-18 NOTE — PROGRESS NOTES
HISTORY OF PRESENT ILLNESS  Patient presents with:  Weight Check: down 2lbs     Idris Cerrato is a 47year old female here for follow up with medical weight loss program for the treatment of overweight, obesity, or morbid obesity.  Patient has lost -#2 none  Exercise/Activity: trying learn how to swim, yoga   Stress level: 7-8/10  Sleep hours and integrity:  8 Hours per night    MEDICAL HISTORY  PMH reviewed:   Cardiac disorders: htn  Diabetes: negative  Thyroid disease: negative  Renal disease: negative CO2 27.0 07/17/2018     Lab Results   Component Value Date     07/17/2018    A1C 5.2 07/17/2018     Lab Results   Component Value Date    CHOLEST 178 07/17/2018    TRIG 145 07/17/2018    HDL 50 07/17/2018    LDL 99 07/17/2018    VLDL 29 07/17/2018 10/2016 Normal sinus rhythm     PLAN   Continue w/ pre-planning for airline flights and not eating on snack charts   Start doing new exercise video with dequan   Work on decrease portions (boxes from [employer])   Verbal food log done  Shwetha Carlson- Nuno Saldivarto balanced breaks (cheese and nuts)- without chocolate     5. Reduce carbohydrates which includes sweets as well as rice, pasta, potatoes, bread, corn and instead choose whole grain options or more protein

## 2019-07-18 NOTE — PATIENT INSTRUCTIONS
Keep up the great work! !     PLAN:  Continue with medications: phentermine 37.5mg, metformin  Follow up with me in 1 month  Schedule follow up appointments:  Dietitian/nutritionist      Please try to work on the following dietary changes:  1.  Drink lots of amena to your daily life.

## 2019-07-23 ENCOUNTER — OFFICE VISIT (OUTPATIENT)
Dept: NEUROLOGY | Facility: CLINIC | Age: 54
End: 2019-07-23
Payer: COMMERCIAL

## 2019-07-23 ENCOUNTER — APPOINTMENT (OUTPATIENT)
Dept: LAB | Age: 54
End: 2019-07-23
Attending: Other
Payer: COMMERCIAL

## 2019-07-23 ENCOUNTER — TELEPHONE (OUTPATIENT)
Dept: INTERNAL MEDICINE CLINIC | Facility: CLINIC | Age: 54
End: 2019-07-23

## 2019-07-23 VITALS
DIASTOLIC BLOOD PRESSURE: 72 MMHG | WEIGHT: 186 LBS | SYSTOLIC BLOOD PRESSURE: 124 MMHG | HEART RATE: 74 BPM | RESPIRATION RATE: 16 BRPM | BODY MASS INDEX: 30 KG/M2

## 2019-07-23 DIAGNOSIS — G62.9 NEUROPATHY: Primary | ICD-10-CM

## 2019-07-23 DIAGNOSIS — G62.9 NEUROPATHY: ICD-10-CM

## 2019-07-23 DIAGNOSIS — G57.32 PERONEAL NEUROPATHY, LEFT: ICD-10-CM

## 2019-07-23 DIAGNOSIS — G57.42 LEFT TIBIAL NEUROPATHY: ICD-10-CM

## 2019-07-23 LAB
CRP SERPL-MCNC: <0.29 MG/DL (ref ?–0.3)
SED RATE-ML: 7 MM/HR (ref 0–25)

## 2019-07-23 PROCEDURE — 86255 FLUORESCENT ANTIBODY SCREEN: CPT | Performed by: OTHER

## 2019-07-23 PROCEDURE — 36415 COLL VENOUS BLD VENIPUNCTURE: CPT | Performed by: OTHER

## 2019-07-23 PROCEDURE — 99244 OFF/OP CNSLTJ NEW/EST MOD 40: CPT | Performed by: OTHER

## 2019-07-23 PROCEDURE — 86038 ANTINUCLEAR ANTIBODIES: CPT | Performed by: OTHER

## 2019-07-23 PROCEDURE — 83876 ASSAY MYELOPEROXIDASE: CPT | Performed by: OTHER

## 2019-07-23 PROCEDURE — 84165 PROTEIN E-PHORESIS SERUM: CPT | Performed by: OTHER

## 2019-07-23 PROCEDURE — 83883 ASSAY NEPHELOMETRY NOT SPEC: CPT | Performed by: OTHER

## 2019-07-23 PROCEDURE — 85652 RBC SED RATE AUTOMATED: CPT | Performed by: OTHER

## 2019-07-23 PROCEDURE — 86140 C-REACTIVE PROTEIN: CPT | Performed by: OTHER

## 2019-07-23 PROCEDURE — 86334 IMMUNOFIX E-PHORESIS SERUM: CPT | Performed by: OTHER

## 2019-07-23 PROCEDURE — 83516 IMMUNOASSAY NONANTIBODY: CPT | Performed by: OTHER

## 2019-07-23 RX ORDER — PREGABALIN 25 MG/1
25 CAPSULE ORAL 2 TIMES DAILY
Qty: 60 CAPSULE | Refills: 2 | Status: SHIPPED | OUTPATIENT
Start: 2019-07-23 | End: 2019-07-25

## 2019-07-23 NOTE — TELEPHONE ENCOUNTER
[facility] sent fax requesting PA for topiramate. We attempted to obtain authorization in June and it was denied. The note is scanned into media. My chart sent to patient to see if she pays out of pocket.

## 2019-07-23 NOTE — PROGRESS NOTES
HPI:    Patient ID: José Gonzalez is a 49year old female. PCP: Dr Liseth Sears    Thank you for requesting this consultation to us.  Below is the summary of my evaluation    HPI   Baldo Demarco is a 49year old female who is referred to us for neuropathy Social History    Tobacco Use      Smoking status: Former Smoker      Smokeless tobacco: Never Used      Tobacco comment: quit     Alcohol use: Not Currently      Comment: 1/2x per month     Drug use: No           Review of Systems   Constitutional: Jolene Scot cortical function intact. Cranial nerves:   II, III, IV, VI :Pupils round, equal and reactive to light  and accommodation bilaterally. Extraocular muscle intact. Visual fields intact.    V: Normal facial sensation   VII: Face is symmetric with normal str [id number]

## 2019-07-23 NOTE — PROGRESS NOTES
The patient is here for numbness and tingling in her left leg. The patient states her left leg muscle is shrinking than her right. The patient states it is painful to walk and having leg spasms.

## 2019-07-24 ENCOUNTER — OFFICE VISIT (OUTPATIENT)
Dept: PAIN CLINIC | Facility: CLINIC | Age: 54
End: 2019-07-24

## 2019-07-24 ENCOUNTER — TELEPHONE (OUTPATIENT)
Dept: NEUROLOGY | Facility: CLINIC | Age: 54
End: 2019-07-24

## 2019-07-24 DIAGNOSIS — G62.9 NEUROPATHY: Primary | ICD-10-CM

## 2019-07-24 DIAGNOSIS — M79.605 LEFT LEG PAIN: Primary | ICD-10-CM

## 2019-07-24 LAB — ANA SCREEN: NEGATIVE

## 2019-07-24 PROCEDURE — 97810 ACUP 1/> WO ESTIM 1ST 15 MIN: CPT | Performed by: ACUPUNCTURIST

## 2019-07-24 PROCEDURE — 97811 ACUP 1/> W/O ESTIM EA ADD 15: CPT | Performed by: ACUPUNCTURIST

## 2019-07-24 NOTE — TELEPHONE ENCOUNTER
Received fax from [place] that they started an electronic PA through [facility], [facility] [address].   PA for Lyrica sent to PA team.

## 2019-07-24 NOTE — PROGRESS NOTES
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 Complaints:  1. Lt sided leg pain beginning heal and extending to hip 8/10  2.  LT sided knee pain  HPI: Three years earlier patient ruptured achilles and after several surgeries pain and ROM

## 2019-07-24 NOTE — TELEPHONE ENCOUNTER
Phoned in Rx for Lyrica, as written at yesterday's OV, to preferred pharmacy with read back confirmation.

## 2019-07-25 ENCOUNTER — OFFICE VISIT (OUTPATIENT)
Dept: NEUROLOGY | Facility: CLINIC | Age: 54
End: 2019-07-25
Payer: COMMERCIAL

## 2019-07-25 VITALS
WEIGHT: 181 LBS | HEART RATE: 84 BPM | SYSTOLIC BLOOD PRESSURE: 122 MMHG | BODY MASS INDEX: 29 KG/M2 | DIASTOLIC BLOOD PRESSURE: 76 MMHG | RESPIRATION RATE: 12 BRPM

## 2019-07-25 DIAGNOSIS — G62.9 NEUROPATHY: Primary | ICD-10-CM

## 2019-07-25 DIAGNOSIS — R29.898 LEFT LEG WEAKNESS: ICD-10-CM

## 2019-07-25 DIAGNOSIS — G90.50 RSD (REFLEX SYMPATHETIC DYSTROPHY): ICD-10-CM

## 2019-07-25 DIAGNOSIS — D36.10 NEUROMA: ICD-10-CM

## 2019-07-25 PROCEDURE — 99215 OFFICE O/P EST HI 40 MIN: CPT | Performed by: OTHER

## 2019-07-25 NOTE — TELEPHONE ENCOUNTER
Per OV note from 7/25/2019 with Dr. Anjali Washington, patient does not want medications. Encounter closed.

## 2019-07-25 NOTE — TELEPHONE ENCOUNTER
Called patient to discuss denial of topiramate. Patient informed and she is going to pay cash and use a coupon from [employer].

## 2019-07-25 NOTE — TELEPHONE ENCOUNTER
I submitted prior auth request to [facility]. Gabapentin had already been tried.  Attached clinicals  Key: [id number]

## 2019-07-25 NOTE — PROGRESS NOTES
Main concern is how to stop nerve damage from getting worse and how to stop atrophy left leg from continuing. Patient would prefer not to take medication.

## 2019-07-25 NOTE — TELEPHONE ENCOUNTER
Lyrica Approved today   PPNEAR:[id number];RSDDSW:[id number]; Review Type:Prior Auth; Coverage Start Date:07/04/2019; Coverage End Date:07/24/2020;

## 2019-07-25 NOTE — PROGRESS NOTES
[facility] outpatient progress note  Date of service: 7/25/2019    Patient here for a follow-up visit for pain and muscle atrophy. She was seen by my associate recently in our office.     Per Dr Rubin Mcleod note:  Anya Wilson is a 49year old female Unspecified essential hypertension    • UTI (lower urinary tract infection)      Past Surgical History:   Procedure Laterality Date   •      • COLONOSCOPY & POLYPECTOMY  - repeat 10 yrs    polyp; tics; hyperplastic   • COLONOSCOPY, POSSIBL and LT  Gait: nl  Romberg: deferred  Neck: supple    Test reviewed on 7/25/2019    A/P:   Overall complicated, multiple neurological conditions that are challenging to manage  Neuropathy  (primary encounter diagnosis); left lower extremity, traumatic etiol

## 2019-07-26 ENCOUNTER — TELEPHONE (OUTPATIENT)
Dept: NEUROLOGY | Facility: CLINIC | Age: 54
End: 2019-07-26

## 2019-07-26 LAB
MYELOPEROX ANTIBODIES, IGG: 0 AU/ML
SERINE PROTEASE3, IGG: 1 AU/ML

## 2019-07-26 NOTE — TELEPHONE ENCOUNTER
Lab results left on voicemail (ok per HIPAA consent).       ----- Message from Bridger Cooley MD sent at 7/24/2019  5:05 PM CDT -----  So far labs are fine

## 2019-07-30 LAB
ALBUMIN SERPL-MCNC: 4.75 G/DL (ref 3.1–4.5)
ALBUMIN/GLOB SERPL: 2.02 {RATIO}
ALPHA1 GLOB SERPL ELPH-MCNC: 0.16 G/DL (ref 0.1–0.3)
ALPHA2 GLOB SERPL ELPH-MCNC: 0.62 G/DL (ref 0.6–1)
B-GLOBULIN SERPL ELPH-MCNC: 0.72 G/DL (ref 0.7–1.2)
GAMMA GLOB SERPL ELPH-MCNC: 0.84 G/DL (ref 0.6–1.6)
KAPPA FREE LIGHT CHAIN: 1.55 MG/DL (ref 0.33–1.94)
KAPPA/LAMBDA FLC RATIO: 1 (ref 0.26–1.65)
LAMBDA FREE LIGHT CHAIN: 1.55 MG/DL (ref 0.57–2.63)
M PROTEIN MFR SERPL ELPH: 7.1 G/DL (ref 6.4–8.2)

## 2019-07-31 ENCOUNTER — OFFICE VISIT (OUTPATIENT)
Dept: PAIN CLINIC | Facility: CLINIC | Age: 54
End: 2019-07-31
Payer: COMMERCIAL

## 2019-07-31 ENCOUNTER — APPOINTMENT (OUTPATIENT)
Dept: PHYSICAL THERAPY | Age: 54
End: 2019-07-31
Attending: Other
Payer: COMMERCIAL

## 2019-07-31 DIAGNOSIS — M79.605 LEFT LEG PAIN: Primary | ICD-10-CM

## 2019-07-31 PROCEDURE — 97811 ACUP 1/> W/O ESTIM EA ADD 15: CPT | Performed by: ACUPUNCTURIST

## 2019-07-31 PROCEDURE — 97810 ACUP 1/> WO ESTIM 1ST 15 MIN: CPT | Performed by: ACUPUNCTURIST

## 2019-07-31 NOTE — PROGRESS NOTES
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 Complaints:  1. Lt sided leg pain beginning heal and extending to hip 8/10  2.  LT sided knee pain  HPI: Three years earlier patient ruptured achilles and after several surgeries pain and ROM

## 2019-08-07 ENCOUNTER — OFFICE VISIT (OUTPATIENT)
Dept: PAIN CLINIC | Facility: CLINIC | Age: 54
End: 2019-08-07
Payer: COMMERCIAL

## 2019-08-07 DIAGNOSIS — M79.605 LEFT LEG PAIN: Primary | ICD-10-CM

## 2019-08-07 PROCEDURE — 97811 ACUP 1/> W/O ESTIM EA ADD 15: CPT | Performed by: ACUPUNCTURIST

## 2019-08-07 PROCEDURE — 97810 ACUP 1/> WO ESTIM 1ST 15 MIN: CPT | Performed by: ACUPUNCTURIST

## 2019-08-07 NOTE — PROGRESS NOTES
Ferne Cheadle is a 49year old female Acupuncture Therapy. Complaints:  1. Lt sided leg pain beginning heal and extending to hip 5/10  2.  LT sided knee pain  HPI: Three years earlier patient ruptured achilles and after several surgeries pain and ROM

## 2019-08-13 ENCOUNTER — TELEPHONE (OUTPATIENT)
Dept: FAMILY MEDICINE CLINIC | Facility: CLINIC | Age: 54
End: 2019-08-13

## 2019-08-13 NOTE — TELEPHONE ENCOUNTER
[facility] from [employer] called and spoke with her. She states that they are calling about patient's Duexis. They are requesting to split the medication for cost savings for patient.   Feli Garcia per medication list this medication as been discontin

## 2019-08-14 ENCOUNTER — OFFICE VISIT (OUTPATIENT)
Dept: PAIN CLINIC | Facility: CLINIC | Age: 54
End: 2019-08-14
Payer: COMMERCIAL

## 2019-08-14 DIAGNOSIS — M79.672 LEFT FOOT PAIN: ICD-10-CM

## 2019-08-14 DIAGNOSIS — M25.552 LEFT HIP PAIN: Primary | ICD-10-CM

## 2019-08-14 DIAGNOSIS — M79.2 NEURALGIA AND NEURITIS: Primary | ICD-10-CM

## 2019-08-14 PROCEDURE — 97811 ACUP 1/> W/O ESTIM EA ADD 15: CPT | Performed by: ACUPUNCTURIST

## 2019-08-14 PROCEDURE — 97810 ACUP 1/> WO ESTIM 1ST 15 MIN: CPT | Performed by: ACUPUNCTURIST

## 2019-08-14 RX ORDER — IBUPROFEN AND FAMOTIDINE 26.6; 8 MG/1; MG/1
1 TABLET, FILM COATED ORAL 3 TIMES DAILY PRN
Qty: 90 TABLET | Refills: 0 | Status: CANCELLED | OUTPATIENT
Start: 2019-08-14

## 2019-08-14 RX ORDER — IBUPROFEN 800 MG/1
800 TABLET ORAL 3 TIMES DAILY
Qty: 270 TABLET | Refills: 3 | Status: CANCELLED | OUTPATIENT
Start: 2019-08-14 | End: 2020-08-08

## 2019-08-14 RX ORDER — IBUPROFEN 800 MG/1
800 TABLET ORAL 3 TIMES DAILY
Qty: 270 TABLET | Refills: 0 | Status: SHIPPED | OUTPATIENT
Start: 2019-08-14 | End: 2020-03-23

## 2019-08-14 RX ORDER — FAMOTIDINE 20 MG/1
20 TABLET ORAL 2 TIMES DAILY
Qty: 180 TABLET | Refills: 0 | Status: SHIPPED | OUTPATIENT
Start: 2019-08-14 | End: 2019-11-12

## 2019-08-14 NOTE — TELEPHONE ENCOUNTER
See TE 8/13/19-[facility] called us as well but states that the Duexis would need to be split out into two drugs. I sent pt the following message: To:  Bartolo Rincon      From: Fatoumata Fang RN      Created: 8/14/2019 10:58 AM        [facility]

## 2019-08-14 NOTE — TELEPHONE ENCOUNTER
Eugenio Matthews from [employer] [facility] states pt is aware that it is 2 meds and that the cost is $5.80 total for 1 month. She states the RX would need to be sent to [facility]. I see now that they do not have the exact dose of famotidine.   I

## 2019-08-14 NOTE — TELEPHONE ENCOUNTER
Medication(s) to Refill:   Requested Prescriptions     Pending Prescriptions Disp Refills   • Ibuprofen-Famotidine (DUEXIS) 800-26.6 MG Oral Tab 90 tablet 0     Sig: Take 1 tablet by mouth 3 (three) times daily as needed.          Reason for Medication Refi

## 2019-08-14 NOTE — TELEPHONE ENCOUNTER
Myrl Lesch sent in RXs and I sent pt a fotobabble message clarifying that the famotidine RX mg is different (20mg) and is only twice a day.

## 2019-08-14 NOTE — PROGRESS NOTES
Malia Jimenes is a 49year old female Acupuncture Therapy. Complaints:  1. Lt sided leg pain beginning heal and extending to hip 3-5/10  2. Toes discomfort and curling 4-6/10  2.  LT sided knee pain  HPI: Three years earlier patient ruptured achilles

## 2019-08-14 NOTE — TELEPHONE ENCOUNTER
Below message received from My Chart Patient Clinical Update:    Problems   This clinical information was not verified.    Medications   This clinical information was not verified, but there are updates pending review:   Reported Medications Start Date Repo

## 2019-08-20 ENCOUNTER — OFFICE VISIT (OUTPATIENT)
Dept: INTEGRATIVE MEDICINE | Facility: CLINIC | Age: 54
End: 2019-08-20
Payer: COMMERCIAL

## 2019-08-20 DIAGNOSIS — M79.672 LEFT FOOT PAIN: Primary | ICD-10-CM

## 2019-08-20 DIAGNOSIS — M79.605 LEFT LEG PAIN: ICD-10-CM

## 2019-08-20 PROCEDURE — 97811 ACUP 1/> W/O ESTIM EA ADD 15: CPT | Performed by: ACUPUNCTURIST

## 2019-08-20 PROCEDURE — 97810 ACUP 1/> WO ESTIM 1ST 15 MIN: CPT | Performed by: ACUPUNCTURIST

## 2019-08-21 NOTE — PROGRESS NOTES
Jelena Gonzáles is a 49year old female Acupuncture Therapy. Complaints:  1. Lt sided leg pain beginning heal and extending to hip 2-4/10  2. Toes discomfort and curling 3-4/10  2.  LT sided knee pain  HPI: Three years earlier patient ruptured achilles

## 2019-09-04 ENCOUNTER — OFFICE VISIT (OUTPATIENT)
Dept: PAIN CLINIC | Facility: CLINIC | Age: 54
End: 2019-09-04
Payer: COMMERCIAL

## 2019-09-04 DIAGNOSIS — M79.675 CHRONIC TOE PAIN, LEFT FOOT: ICD-10-CM

## 2019-09-04 DIAGNOSIS — M79.605 LEFT LEG PAIN: Primary | ICD-10-CM

## 2019-09-04 DIAGNOSIS — G89.29 CHRONIC TOE PAIN, LEFT FOOT: ICD-10-CM

## 2019-09-04 PROCEDURE — 97810 ACUP 1/> WO ESTIM 1ST 15 MIN: CPT | Performed by: ACUPUNCTURIST

## 2019-09-04 PROCEDURE — 97811 ACUP 1/> W/O ESTIM EA ADD 15: CPT | Performed by: ACUPUNCTURIST

## 2019-09-04 NOTE — PROGRESS NOTES
Amari Leone is a 49year old female Acupuncture Therapy. Complaints:  1. Lt sided leg pain beginning heal and extending to hip 2-4/10  2. Toes discomfort and curling 3-4/10  2.  LT sided knee pain  HPI: Three years earlier patient ruptured achilles

## 2019-09-10 ENCOUNTER — OFFICE VISIT (OUTPATIENT)
Dept: INTEGRATIVE MEDICINE | Facility: CLINIC | Age: 54
End: 2019-09-10
Payer: COMMERCIAL

## 2019-09-10 DIAGNOSIS — M79.672 LEFT FOOT PAIN: ICD-10-CM

## 2019-09-10 DIAGNOSIS — M79.605 LEFT LEG PAIN: Primary | ICD-10-CM

## 2019-09-10 PROCEDURE — 97811 ACUP 1/> W/O ESTIM EA ADD 15: CPT | Performed by: ACUPUNCTURIST

## 2019-09-10 PROCEDURE — 97810 ACUP 1/> WO ESTIM 1ST 15 MIN: CPT | Performed by: ACUPUNCTURIST

## 2019-09-11 NOTE — PROGRESS NOTES
Stacia Hicks is a 49year old female Acupuncture Therapy. Complaints:  1. Lt sided leg pain beginning heal and extending to hip 5/10  2. Toes discomfort and curling 3-4/10  2.  LT sided knee pain  HPI: Three years earlier patient ruptured achilles a

## 2019-09-11 NOTE — PATIENT INSTRUCTIONS
Begin taking tumeric 500mg once per day. Also, consider taking magnesium glycinate  300-400mg 1 time per day.

## 2019-09-17 ENCOUNTER — OFFICE VISIT (OUTPATIENT)
Dept: INTEGRATIVE MEDICINE | Facility: CLINIC | Age: 54
End: 2019-09-17
Payer: COMMERCIAL

## 2019-09-17 DIAGNOSIS — M79.675 PAIN OF TOE OF LEFT FOOT: Primary | ICD-10-CM

## 2019-09-17 PROCEDURE — 97811 ACUP 1/> W/O ESTIM EA ADD 15: CPT | Performed by: ACUPUNCTURIST

## 2019-09-17 PROCEDURE — 97810 ACUP 1/> WO ESTIM 1ST 15 MIN: CPT | Performed by: ACUPUNCTURIST

## 2019-09-17 NOTE — PROGRESS NOTES
Jose Moe is a 49year old female Acupuncture Therapy. Complaints:  1. Lt sided leg pain beginning heal and extending to hip 5/10  2. Toes discomfort and curling 3-4/10  2. LT sided knee pain  3.  Nasal congestion 7/10  HPI: Three years earlier p

## 2019-09-17 NOTE — PATIENT INSTRUCTIONS
Use neti pot for nasal congestion and avoid dairy and grains. Drink zak tea. Continue with supplements.
I will SWITCH the dose or number of times a day I take the medications listed below when I get home from the hospital:  None

## 2019-09-18 DIAGNOSIS — Z51.81 ENCOUNTER FOR THERAPEUTIC DRUG MONITORING: ICD-10-CM

## 2019-09-18 DIAGNOSIS — E66.9 OBESITY (BMI 30-39.9): ICD-10-CM

## 2019-09-18 RX ORDER — PHENTERMINE HYDROCHLORIDE 37.5 MG/1
TABLET ORAL
Qty: 30 TABLET | Refills: 0 | OUTPATIENT
Start: 2019-09-18

## 2019-09-18 NOTE — TELEPHONE ENCOUNTER
Requesting Phentermine  LOV: 7/18/19  RTC: one month  Last Relevant Labs: na  Filled: 7/18/19 #30 with 1 refills    Future Appointments   Date Time Provider Nabor Meyer   9/25/2019  1:00 PM Virgel Romberg [facility] [place]   10/24/2019  1:20 PM [place]

## 2019-09-19 DIAGNOSIS — I10 HYPERTENSION, UNSPECIFIED TYPE: ICD-10-CM

## 2019-09-19 DIAGNOSIS — E66.9 OBESITY (BMI 30-39.9): ICD-10-CM

## 2019-09-19 DIAGNOSIS — Z51.81 ENCOUNTER FOR THERAPEUTIC DRUG MONITORING: ICD-10-CM

## 2019-09-19 NOTE — TELEPHONE ENCOUNTER
Requesting Metformin  LOV: 7/18/19  RTC: one month  Last Relevant Labs: 7/17/18  Filled: 6/3/19 #180 with 0 refills    Future Appointments   Date Time Provider Nabor Meyer   9/25/2019  1:00 PM China Richards   10/24/2019  1:20 PM

## 2019-09-20 RX ORDER — HYDROCHLOROTHIAZIDE 25 MG/1
TABLET ORAL
Qty: 90 TABLET | Refills: 1 | OUTPATIENT
Start: 2019-09-20

## 2019-09-23 NOTE — TELEPHONE ENCOUNTER
I spoke with patient and she will call back and schedule an appointment. She is in the middle of a store currently. I explained she was due in August.  If she schedules I can then ask Cindy Presume about a refill.   She will call or schedule online when she ge

## 2019-09-24 ENCOUNTER — OFFICE VISIT (OUTPATIENT)
Dept: INTERNAL MEDICINE CLINIC | Facility: CLINIC | Age: 54
End: 2019-09-24
Payer: COMMERCIAL

## 2019-09-24 VITALS
DIASTOLIC BLOOD PRESSURE: 78 MMHG | WEIGHT: 188 LBS | HEART RATE: 80 BPM | RESPIRATION RATE: 16 BRPM | HEIGHT: 66.5 IN | SYSTOLIC BLOOD PRESSURE: 130 MMHG | BODY MASS INDEX: 29.86 KG/M2

## 2019-09-24 DIAGNOSIS — I10 HYPERTENSION, UNSPECIFIED TYPE: ICD-10-CM

## 2019-09-24 DIAGNOSIS — Z51.81 ENCOUNTER FOR THERAPEUTIC DRUG MONITORING: Primary | ICD-10-CM

## 2019-09-24 DIAGNOSIS — E66.9 OBESITY (BMI 30-39.9): ICD-10-CM

## 2019-09-24 PROCEDURE — 99214 OFFICE O/P EST MOD 30 MIN: CPT | Performed by: NURSE PRACTITIONER

## 2019-09-24 RX ORDER — METFORMIN HYDROCHLORIDE 750 MG/1
TABLET, EXTENDED RELEASE ORAL
Qty: 180 TABLET | Refills: 4 | OUTPATIENT
Start: 2019-09-24

## 2019-09-24 RX ORDER — PHENTERMINE HYDROCHLORIDE 37.5 MG/1
37.5 TABLET ORAL EVERY MORNING
Qty: 30 TABLET | Refills: 0 | Status: SHIPPED | OUTPATIENT
Start: 2019-09-24 | End: 2020-01-06 | Stop reason: ALTCHOICE

## 2019-09-24 RX ORDER — METFORMIN HYDROCHLORIDE 750 MG/1
750 TABLET, EXTENDED RELEASE ORAL 2 TIMES DAILY WITH MEALS
Qty: 180 TABLET | Refills: 0 | Status: SHIPPED | OUTPATIENT
Start: 2019-09-24 | End: 2020-01-06

## 2019-09-24 NOTE — PROGRESS NOTES
HISTORY OF PRESENT ILLNESS  Patient presents with:  Weight Check: 2 pound weight gain     Jose Moe is a 49year old female here for follow up with medical weight loss program for the treatment of overweight, obesity, or morbid obesity.  Patient ha months  Supplements: none  Exercise/Activity: trying learn how to swim, yoga   Stress level: 7-8/10  Sleep hours and integrity:  8 Hours per night    MEDICAL HISTORY  PMH reviewed:   Cardiac disorders: htn  Diabetes: negative  Thyroid disease: negative  Re 106 07/17/2018    CO2 27.0 07/17/2018     Lab Results   Component Value Date     07/17/2018    A1C 5.2 07/17/2018     Lab Results   Component Value Date    CHOLEST 178 07/17/2018    TRIG 145 07/17/2018    HDL 50 07/17/2018    LDL 99 07/17/2018    VL months    Abnormal labs: vitamin d deficiency (hasn't been on high dose for about 1 yr--> will recheck today), elevated lipids (from 2017)--> encouraged to get repeated today   EKG completed at PCP office on 10/2016 Normal sinus rhythm     PLAN   Continue

## 2019-09-24 NOTE — PATIENT INSTRUCTIONS
Keep up the great work! !     PLAN:  Continue with medications: phentermine and metformin  Follow up with me in 1 month  Schedule follow up appointments:  Dietitian/nutritionist      Please try to work on the following dietary changes:  1.  Drink lots of dottie to your daily life.

## 2019-09-25 ENCOUNTER — OFFICE VISIT (OUTPATIENT)
Dept: PAIN CLINIC | Facility: CLINIC | Age: 54
End: 2019-09-25
Payer: COMMERCIAL

## 2019-09-25 DIAGNOSIS — M79.672 FOOT PAIN, LEFT: Primary | ICD-10-CM

## 2019-09-25 PROCEDURE — 97811 ACUP 1/> W/O ESTIM EA ADD 15: CPT | Performed by: ACUPUNCTURIST

## 2019-09-25 PROCEDURE — 97810 ACUP 1/> WO ESTIM 1ST 15 MIN: CPT | Performed by: ACUPUNCTURIST

## 2019-09-25 NOTE — PROGRESS NOTES
Marty Shine is a 49year old female Acupuncture Therapy. Complaints:  1. Lt sided leg pain beginning heal and extending to hip 0/10  2. Toes discomfort and curling 3-4/10  2. LT sided knee pain  3.  Nasal congestion 7/10  HPI: Three years earlier p

## 2019-09-27 ENCOUNTER — OFFICE VISIT (OUTPATIENT)
Dept: INTEGRATIVE MEDICINE | Facility: CLINIC | Age: 54
End: 2019-09-27
Payer: COMMERCIAL

## 2019-09-27 VITALS — DIASTOLIC BLOOD PRESSURE: 78 MMHG | SYSTOLIC BLOOD PRESSURE: 120 MMHG

## 2019-09-27 DIAGNOSIS — M79.672 LEFT FOOT PAIN: Primary | ICD-10-CM

## 2019-09-27 PROCEDURE — 97811 ACUP 1/> W/O ESTIM EA ADD 15: CPT | Performed by: ACUPUNCTURIST

## 2019-09-27 PROCEDURE — 97810 ACUP 1/> WO ESTIM 1ST 15 MIN: CPT | Performed by: ACUPUNCTURIST

## 2019-09-27 NOTE — PROGRESS NOTES
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 Complaints:  1. Toes discomfort and curling 3-4/10  2. LT sided knee pain 0/10  3.  Nasal congestion 5/10  HPI: Three years earlier patient ruptured achilles and after several surgeries pain

## 2019-10-09 ENCOUNTER — OFFICE VISIT (OUTPATIENT)
Dept: PAIN CLINIC | Facility: CLINIC | Age: 54
End: 2019-10-09
Payer: COMMERCIAL

## 2019-10-09 DIAGNOSIS — R51.9 FRONTAL HEADACHE: ICD-10-CM

## 2019-10-09 DIAGNOSIS — M79.674 CHRONIC TOE PAIN, RIGHT FOOT: Primary | ICD-10-CM

## 2019-10-09 DIAGNOSIS — G89.29 CHRONIC TOE PAIN, RIGHT FOOT: Primary | ICD-10-CM

## 2019-10-09 PROCEDURE — 97810 ACUP 1/> WO ESTIM 1ST 15 MIN: CPT | Performed by: ACUPUNCTURIST

## 2019-10-09 PROCEDURE — 97811 ACUP 1/> W/O ESTIM EA ADD 15: CPT | Performed by: ACUPUNCTURIST

## 2019-10-09 NOTE — PROGRESS NOTES
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 Complaints:  1. Toes discomfort and curling 4/10  2. Frontal headache 8/10  3.  Nasal congestion 3/10  HPI: Three years earlier patient ruptured achilles and after several surgeries pain and

## 2019-10-11 ENCOUNTER — OFFICE VISIT (OUTPATIENT)
Dept: INTEGRATIVE MEDICINE | Facility: CLINIC | Age: 54
End: 2019-10-11
Payer: COMMERCIAL

## 2019-10-11 DIAGNOSIS — M79.675 TOE PAIN, CHRONIC, LEFT: Primary | ICD-10-CM

## 2019-10-11 DIAGNOSIS — G89.29 TOE PAIN, CHRONIC, LEFT: Primary | ICD-10-CM

## 2019-10-11 PROCEDURE — 97811 ACUP 1/> W/O ESTIM EA ADD 15: CPT | Performed by: ACUPUNCTURIST

## 2019-10-11 PROCEDURE — 97810 ACUP 1/> WO ESTIM 1ST 15 MIN: CPT | Performed by: ACUPUNCTURIST

## 2019-10-11 NOTE — PROGRESS NOTES
Malia Jimenes is a 49year old female Acupuncture Therapy. Complaints:  1. Toes discomfort and curling 4/10  2. Stress  3. Nasal congestion 3/10  HPI: Three years earlier patient ruptured achilles and after several surgeries pain and ROM improved.

## 2019-10-18 ENCOUNTER — OFFICE VISIT (OUTPATIENT)
Dept: INTEGRATIVE MEDICINE | Facility: CLINIC | Age: 54
End: 2019-10-18
Payer: COMMERCIAL

## 2019-10-18 DIAGNOSIS — M79.675 PAIN OF TOE OF LEFT FOOT: Primary | ICD-10-CM

## 2019-10-18 PROCEDURE — 97810 ACUP 1/> WO ESTIM 1ST 15 MIN: CPT | Performed by: ACUPUNCTURIST

## 2019-10-18 PROCEDURE — 97811 ACUP 1/> W/O ESTIM EA ADD 15: CPT | Performed by: ACUPUNCTURIST

## 2019-10-18 NOTE — PROGRESS NOTES
Coral Nagy is a 49year old female Acupuncture Therapy. Complaints: 1. LT  Toes discomfort and curling 3/10  2. Stress  3.  Nasal congestion 3/10  HPI: Three years earlier patient ruptured achilles and after several surgeries pain and ROM improved

## 2019-10-18 NOTE — PATIENT INSTRUCTIONS
Smokeless moxa stick instructions: 1. Use a metal can and fill with sand to extinguish stick after treatment  2. Light moxa stick with a candle; you should notice that the end has become orange and should feel quite warm to hot  3.   Hold the moxa stick chao

## 2019-10-23 ENCOUNTER — OFFICE VISIT (OUTPATIENT)
Dept: PAIN CLINIC | Facility: CLINIC | Age: 54
End: 2019-10-23
Payer: COMMERCIAL

## 2019-10-23 DIAGNOSIS — M79.675 CHRONIC TOE PAIN, LEFT FOOT: Primary | ICD-10-CM

## 2019-10-23 DIAGNOSIS — G89.29 CHRONIC TOE PAIN, LEFT FOOT: Primary | ICD-10-CM

## 2019-10-23 PROCEDURE — 97810 ACUP 1/> WO ESTIM 1ST 15 MIN: CPT | Performed by: ACUPUNCTURIST

## 2019-10-23 PROCEDURE — 97811 ACUP 1/> W/O ESTIM EA ADD 15: CPT | Performed by: ACUPUNCTURIST

## 2019-10-23 NOTE — PROGRESS NOTES
Rayray Arnold is a 49year old female Acupuncture Therapy. Complaints: 1. LT  Toes discomfort and curling 3/10  2. Stress  3.  Nasal congestion 3/10  HPI: Three years earlier patient ruptured achilles and after several surgeries pain and ROM improved

## 2019-10-24 ENCOUNTER — OFFICE VISIT (OUTPATIENT)
Dept: NEUROLOGY | Facility: CLINIC | Age: 54
End: 2019-10-24
Payer: COMMERCIAL

## 2019-10-24 VITALS
HEART RATE: 78 BPM | DIASTOLIC BLOOD PRESSURE: 68 MMHG | WEIGHT: 189 LBS | SYSTOLIC BLOOD PRESSURE: 124 MMHG | BODY MASS INDEX: 30 KG/M2 | RESPIRATION RATE: 16 BRPM

## 2019-10-24 DIAGNOSIS — M79.2 NEUROPATHIC PAIN: Primary | ICD-10-CM

## 2019-10-24 PROCEDURE — 99214 OFFICE O/P EST MOD 30 MIN: CPT | Performed by: OTHER

## 2019-10-24 NOTE — PROGRESS NOTES
[facility] Neurology outpatient progress note  Date of service: 10/24/2019    Patient here for a follow-up visit for pain and muscle atrophy in left leg. she states acupuncture helps slightly.   States pain and weakness in left leg, atrophy still present, had exte (20 mg total) by mouth 2 (two) times daily.  (Patient taking differently: Take 20 mg by mouth as needed.  ), Disp: 180 tablet, Rfl: 0  •  hydrochlorothiazide 25 MG Oral Tab, TAKE 1 TABLET DAILY, Disp: 90 tablet, Rfl: 0  •  Fluticasone Propionate 50 MCG/ACT achilles reflex   Plantar response: bilateral flexor  Coordination: Normal FTN  Sensory: symmetric to PP and LT  Gait: nl  Romberg: nl  Neck: supple    Test reviewed on 10/24/2019    A/P:   Neuropathy  (primary encounter diagnosis); left lower extremity, t

## 2019-10-24 NOTE — PROGRESS NOTES
Patient here for follow up for neuropathy In left lower extremity. Acupuncture 1x a week is helping but still not 100% better.

## 2019-10-30 ENCOUNTER — OFFICE VISIT (OUTPATIENT)
Dept: PAIN CLINIC | Facility: CLINIC | Age: 54
End: 2019-10-30
Payer: COMMERCIAL

## 2019-10-30 DIAGNOSIS — M79.675 CHRONIC TOE PAIN, LEFT FOOT: Primary | ICD-10-CM

## 2019-10-30 DIAGNOSIS — G89.29 CHRONIC TOE PAIN, LEFT FOOT: Primary | ICD-10-CM

## 2019-10-30 PROCEDURE — 97811 ACUP 1/> W/O ESTIM EA ADD 15: CPT | Performed by: ACUPUNCTURIST

## 2019-10-30 PROCEDURE — 97810 ACUP 1/> WO ESTIM 1ST 15 MIN: CPT | Performed by: ACUPUNCTURIST

## 2019-10-30 NOTE — PROGRESS NOTES
Rachel Hernadez is a 49year old female Acupuncture Therapy. Complaints: 1. LT  Toes discomfort and curling 3/10  2. Stress  3.  Nasal congestion 1/10  HPI: Three years earlier patient ruptured achilles and after several surgeries pain and ROM improved

## 2019-11-06 ENCOUNTER — OFFICE VISIT (OUTPATIENT)
Dept: PAIN CLINIC | Facility: CLINIC | Age: 54
End: 2019-11-06
Payer: COMMERCIAL

## 2019-11-06 DIAGNOSIS — M79.675 CHRONIC TOE PAIN, LEFT FOOT: Primary | ICD-10-CM

## 2019-11-06 DIAGNOSIS — G89.29 CHRONIC TOE PAIN, LEFT FOOT: Primary | ICD-10-CM

## 2019-11-06 PROCEDURE — 99212 OFFICE O/P EST SF 10 MIN: CPT | Performed by: ACUPUNCTURIST

## 2019-11-06 PROCEDURE — 97810 ACUP 1/> WO ESTIM 1ST 15 MIN: CPT | Performed by: ACUPUNCTURIST

## 2019-11-06 PROCEDURE — 97811 ACUP 1/> W/O ESTIM EA ADD 15: CPT | Performed by: ACUPUNCTURIST

## 2019-11-06 NOTE — PROGRESS NOTES
Pari Garcia is a 49year old female Acupuncture Therapy. Complaints: 1. LT  Toes discomfort and curling 3/10  2. Stress  3.  Nasal congestion 0/10  HPI: Three years earlier patient ruptured achilles and after several surgeries pain and ROM improved

## 2019-11-13 ENCOUNTER — OFFICE VISIT (OUTPATIENT)
Dept: PAIN CLINIC | Facility: CLINIC | Age: 54
End: 2019-11-13
Payer: COMMERCIAL

## 2019-11-13 ENCOUNTER — OFFICE VISIT (OUTPATIENT)
Dept: FAMILY MEDICINE CLINIC | Facility: CLINIC | Age: 54
End: 2019-11-13
Payer: COMMERCIAL

## 2019-11-13 DIAGNOSIS — G89.4 CHRONIC PAIN SYNDROME: ICD-10-CM

## 2019-11-13 DIAGNOSIS — M79.675 CHRONIC TOE PAIN, LEFT FOOT: Primary | ICD-10-CM

## 2019-11-13 DIAGNOSIS — M25.572 CHRONIC PAIN OF LEFT ANKLE: Primary | ICD-10-CM

## 2019-11-13 DIAGNOSIS — M62.9 HAMSTRING TIGHTNESS OF BOTH LOWER EXTREMITIES: ICD-10-CM

## 2019-11-13 DIAGNOSIS — G89.29 CHRONIC TOE PAIN, LEFT FOOT: Primary | ICD-10-CM

## 2019-11-13 DIAGNOSIS — G89.29 CHRONIC PAIN OF LEFT ANKLE: Primary | ICD-10-CM

## 2019-11-13 PROCEDURE — 99214 OFFICE O/P EST MOD 30 MIN: CPT | Performed by: FAMILY MEDICINE

## 2019-11-13 PROCEDURE — 97811 ACUP 1/> W/O ESTIM EA ADD 15: CPT | Performed by: ACUPUNCTURIST

## 2019-11-13 PROCEDURE — 98929 OSTEOPATH MANJ 9-10 REGIONS: CPT | Performed by: FAMILY MEDICINE

## 2019-11-13 PROCEDURE — 97810 ACUP 1/> WO ESTIM 1ST 15 MIN: CPT | Performed by: ACUPUNCTURIST

## 2019-11-13 NOTE — PROGRESS NOTES
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 Complaints: 1. LT  Toes discomfort and curling 3/10  2. Stress  HPI: Three years earlier patient ruptured achilles and after several surgeries pain and ROM improved.  The past 7-8 months she

## 2019-11-13 NOTE — PROGRESS NOTES
HPI:    Patient ID: Jennifer Mejia is a 49year old female. Patient with complaints of chronic pain to left ankle, lower leg. Left hip. First surgery 7 years ago. 9 surgeries on left ankle, Achilles.   Positive stress with home work and dealing with decreased tightness  Tibialis intact  Shoulder/sternocleidomastoid intact  Visuals intact throughout    1 hour visit with greater than half the visit spent with counseling, positive mental imaging. Breathing with activity. Stress management.        ASSESS

## 2019-11-20 ENCOUNTER — OFFICE VISIT (OUTPATIENT)
Dept: PAIN CLINIC | Facility: CLINIC | Age: 54
End: 2019-11-20
Payer: COMMERCIAL

## 2019-11-20 DIAGNOSIS — G89.29 TOE PAIN, CHRONIC, LEFT: Primary | ICD-10-CM

## 2019-11-20 DIAGNOSIS — M79.675 TOE PAIN, CHRONIC, LEFT: Primary | ICD-10-CM

## 2019-11-20 PROCEDURE — 97810 ACUP 1/> WO ESTIM 1ST 15 MIN: CPT | Performed by: ACUPUNCTURIST

## 2019-11-20 PROCEDURE — 97811 ACUP 1/> W/O ESTIM EA ADD 15: CPT | Performed by: ACUPUNCTURIST

## 2019-11-20 NOTE — PROGRESS NOTES
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 Complaints: 1. LT  Toes discomfort and curling 3/10  2. Stress  HPI: Three years earlier patient ruptured achilles and after several surgeries pain and ROM improved.  The past 7-8 months she

## 2019-12-17 ENCOUNTER — OFFICE VISIT (OUTPATIENT)
Dept: INTEGRATIVE MEDICINE | Facility: CLINIC | Age: 54
End: 2019-12-17
Payer: COMMERCIAL

## 2019-12-17 DIAGNOSIS — M79.672 LEFT FOOT PAIN: Primary | ICD-10-CM

## 2019-12-17 PROCEDURE — 97811 ACUP 1/> W/O ESTIM EA ADD 15: CPT | Performed by: ACUPUNCTURIST

## 2019-12-17 PROCEDURE — 97810 ACUP 1/> WO ESTIM 1ST 15 MIN: CPT | Performed by: ACUPUNCTURIST

## 2019-12-18 NOTE — PROGRESS NOTES
Malia Jimenes is a 49year old female Acupuncture Therapy. Complaints: 1. LT  Toes pain and numbness 5-7/10  2. Stress 7/10  HPI: Three years earlier patient ruptured achilles and after several surgeries pain and ROM improved.  The past 7-8 months s

## 2019-12-30 ENCOUNTER — OFFICE VISIT (OUTPATIENT)
Dept: INTEGRATIVE MEDICINE | Facility: CLINIC | Age: 54
End: 2019-12-30
Payer: COMMERCIAL

## 2019-12-30 DIAGNOSIS — M79.672 LEFT FOOT PAIN: Primary | ICD-10-CM

## 2019-12-30 PROCEDURE — 97811 ACUP 1/> W/O ESTIM EA ADD 15: CPT | Performed by: ACUPUNCTURIST

## 2019-12-30 PROCEDURE — 97810 ACUP 1/> WO ESTIM 1ST 15 MIN: CPT | Performed by: ACUPUNCTURIST

## 2019-12-30 NOTE — PROGRESS NOTES
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 Complaints: 1. LT  Toes pain and numbness 4-5/10  2. Stress 7/10  HPI: Three years earlier patient ruptured achilles and after several surgeries pain and ROM improved.  The past 7-8 months s

## 2020-01-06 ENCOUNTER — TELEPHONE (OUTPATIENT)
Dept: FAMILY MEDICINE CLINIC | Facility: CLINIC | Age: 55
End: 2020-01-06

## 2020-01-06 ENCOUNTER — HOSPITAL ENCOUNTER (OUTPATIENT)
Dept: GENERAL RADIOLOGY | Age: 55
Discharge: HOME | End: 2020-01-06
Attending: FAMILY MEDICINE
Payer: COMMERCIAL

## 2020-01-06 ENCOUNTER — OFFICE VISIT (OUTPATIENT)
Dept: FAMILY MEDICINE CLINIC | Facility: CLINIC | Age: 55
End: 2020-01-06
Payer: COMMERCIAL

## 2020-01-06 VITALS
OXYGEN SATURATION: 99 % | TEMPERATURE: 98 F | SYSTOLIC BLOOD PRESSURE: 110 MMHG | HEIGHT: 66.5 IN | BODY MASS INDEX: 30.81 KG/M2 | RESPIRATION RATE: 16 BRPM | HEART RATE: 64 BPM | DIASTOLIC BLOOD PRESSURE: 70 MMHG | WEIGHT: 194 LBS

## 2020-01-06 DIAGNOSIS — Z51.81 ENCOUNTER FOR THERAPEUTIC DRUG MONITORING: ICD-10-CM

## 2020-01-06 DIAGNOSIS — R68.89 FLU-LIKE SYMPTOMS: Primary | ICD-10-CM

## 2020-01-06 DIAGNOSIS — E66.9 OBESITY (BMI 30-39.9): ICD-10-CM

## 2020-01-06 DIAGNOSIS — R05.8 PRODUCTIVE COUGH: ICD-10-CM

## 2020-01-06 DIAGNOSIS — Z12.39 SCREENING FOR BREAST CANCER: ICD-10-CM

## 2020-01-06 PROCEDURE — 71046 X-RAY EXAM CHEST 2 VIEWS: CPT | Performed by: FAMILY MEDICINE

## 2020-01-06 PROCEDURE — 99213 OFFICE O/P EST LOW 20 MIN: CPT | Performed by: FAMILY MEDICINE

## 2020-01-06 PROCEDURE — 87486 CHLMYD PNEUM DNA AMP PROBE: CPT | Performed by: FAMILY MEDICINE

## 2020-01-06 PROCEDURE — 87581 M.PNEUMON DNA AMP PROBE: CPT | Performed by: FAMILY MEDICINE

## 2020-01-06 PROCEDURE — 87633 RESP VIRUS 12-25 TARGETS: CPT | Performed by: FAMILY MEDICINE

## 2020-01-06 PROCEDURE — 87798 DETECT AGENT NOS DNA AMP: CPT | Performed by: FAMILY MEDICINE

## 2020-01-06 RX ORDER — METFORMIN HYDROCHLORIDE 750 MG/1
750 TABLET, EXTENDED RELEASE ORAL 2 TIMES DAILY WITH MEALS
Qty: 180 TABLET | Refills: 0 | Status: SHIPPED | OUTPATIENT
Start: 2020-01-06 | End: 2020-06-26

## 2020-01-06 RX ORDER — ALBUTEROL SULFATE 90 UG/1
2 AEROSOL, METERED RESPIRATORY (INHALATION) EVERY 4 HOURS PRN
Qty: 1 INHALER | Refills: 0 | Status: SHIPPED | OUTPATIENT
Start: 2020-01-06 | End: 2020-06-26

## 2020-01-06 NOTE — TELEPHONE ENCOUNTER
Notified patient of normal cxr   Treating as viral illness - influenza likely   resp viral panel pending

## 2020-01-06 NOTE — PATIENT INSTRUCTIONS
If the chest xray shows pneumonia, we will start you on an antibiotic, but if there is no pneumonia, then your illness is due to a virus and your body will fight the infection.  You can take over the counter medicine to lessen some of the symptoms, but you For some people, the flu can be very serious.  The risk for complications is greater for:  · Children younger than age 5  · Adults ages 65 and older  · People with a chronic illness such as diabetes or heart, kidney, or lung disease  · People who live in a · One of the best ways to prevent the flu is to get a flu vaccine each year. The CDC recommends that all people 10months of age and older get a flu vaccine every year. The virus that causes the flu changes from year to year.  For that reason, healthcare pro · Rub your hands together briskly, cleaning the backs of your hands, the palms, between your fingers, and up the wrists. · Rub until the gel is gone and your hands are completely dry.   Preventing the flu in healthcare settings  The flu is a special concer

## 2020-01-06 NOTE — PROGRESS NOTES
[facility] Family Medicine Office Note  Chief Complaint:   Patient presents with:  Cough: x6 days, SOB, no wheezing   Fever: x6 days, on and off max temp 101F taken orally      HPI:   This is a 49year old female coming in for  HPI  Cough and fev Dispense Refill   • metFORMIN HCl  MG Oral Tablet 24 Hr Take 1 tablet (750 mg total) by mouth 2 (two) times daily with meals.  180 tablet 0   • Albuterol Sulfate  (90 Base) MCG/ACT Inhalation Aero Soln Inhale 2 puffs into the lungs every 4 (fou maxillary sinus tenderness and no frontal sinus tenderness. Left sinus exhibits no maxillary sinus tenderness and no frontal sinus tenderness. Mouth/Throat: Oropharynx is clear and moist.   Eyes: Pupils are equal, round, and reactive to light.  Conjunctiv with any questions, complications, allergies, or worsening or changing symptoms. Patient is to call with any side effects or complications from the treatments as a result of today.

## 2020-01-07 LAB
ADENOVIRUS PCR:: NEGATIVE
B PERT DNA SPEC QL NAA+PROBE: NEGATIVE
C PNEUM DNA SPEC QL NAA+PROBE: NEGATIVE
CORONAVIRUS 229E PCR:: NEGATIVE
CORONAVIRUS HKU1 PCR:: NEGATIVE
CORONAVIRUS NL63 PCR:: NEGATIVE
CORONAVIRUS OC43 PCR:: NEGATIVE
FLUAV H1 2009 PAND RNA SPEC QL NAA+PROBE: POSITIVE
FLUBV RNA SPEC QL NAA+PROBE: NEGATIVE
METAPNEUMOVIRUS PCR:: NEGATIVE
MYCOPLASMA PNEUMONIA PCR:: NEGATIVE
PARAINFLUENZA 1 PCR:: NEGATIVE
PARAINFLUENZA 2 PCR:: NEGATIVE
PARAINFLUENZA 3 PCR:: NEGATIVE
PARAINFLUENZA 4 PCR:: NEGATIVE
RHINOVIRUS/ENTERO PCR:: NEGATIVE
RSV RNA SPEC QL NAA+PROBE: NEGATIVE

## 2020-01-07 NOTE — TELEPHONE ENCOUNTER
Please notify patient - influenza A positive   Past the window for antiviral tamiflu. Cont supportive care as discussed at visit   If sx worsen - follow up in clinic or IC/ER.

## 2020-01-07 NOTE — TELEPHONE ENCOUNTER
I called pt at [phone] and verified 2 patient identifiers: name & . I discussed results with patient. All questions answered.

## 2020-01-08 ENCOUNTER — OFFICE VISIT (OUTPATIENT)
Dept: FAMILY MEDICINE CLINIC | Facility: CLINIC | Age: 55
End: 2020-01-08
Payer: COMMERCIAL

## 2020-01-08 ENCOUNTER — OFFICE VISIT (OUTPATIENT)
Dept: INTEGRATIVE MEDICINE | Facility: CLINIC | Age: 55
End: 2020-01-08
Payer: COMMERCIAL

## 2020-01-08 DIAGNOSIS — G89.29 CHRONIC PAIN OF LEFT ANKLE: Primary | ICD-10-CM

## 2020-01-08 DIAGNOSIS — F43.9 STRESS: ICD-10-CM

## 2020-01-08 DIAGNOSIS — M62.9 HAMSTRING TIGHTNESS OF BOTH LOWER EXTREMITIES: ICD-10-CM

## 2020-01-08 DIAGNOSIS — M54.2 NECK PAIN: ICD-10-CM

## 2020-01-08 DIAGNOSIS — G62.9 NEUROPATHY: ICD-10-CM

## 2020-01-08 DIAGNOSIS — M25.572 CHRONIC PAIN OF LEFT ANKLE: Primary | ICD-10-CM

## 2020-01-08 DIAGNOSIS — M26.623 BILATERAL TEMPOROMANDIBULAR JOINT PAIN: ICD-10-CM

## 2020-01-08 PROCEDURE — 98929 OSTEOPATH MANJ 9-10 REGIONS: CPT | Performed by: FAMILY MEDICINE

## 2020-01-08 PROCEDURE — 97811 ACUP 1/> W/O ESTIM EA ADD 15: CPT | Performed by: ACUPUNCTURIST

## 2020-01-08 PROCEDURE — 99214 OFFICE O/P EST MOD 30 MIN: CPT | Performed by: FAMILY MEDICINE

## 2020-01-08 PROCEDURE — 97810 ACUP 1/> WO ESTIM 1ST 15 MIN: CPT | Performed by: ACUPUNCTURIST

## 2020-01-08 NOTE — PROGRESS NOTES
HPI:    Patient ID: Bartolo Rincon is a 49year old female. Patient with persistent left lower leg and foot pain. Lateral.  Worse with walking. History of multiple surgeries. Works as a . Stress. Regular exercise.   Without swellin calf tightness  Pterygoid relaxed  Decrease tightness lumbar, thoracic paraspinal musculature    1 hour visit with greater than half the visit spent with counseling, positive mental imaging. Encourage patient to self activate with massage temples/jaw.   Ca

## 2020-01-09 ENCOUNTER — TELEPHONE (OUTPATIENT)
Dept: FAMILY MEDICINE CLINIC | Facility: CLINIC | Age: 55
End: 2020-01-09

## 2020-01-09 NOTE — TELEPHONE ENCOUNTER
Received [facility] paperwork for Gertrude to allow her to make acupuncture an PT appts. Pt request 6-8 days a month. Dr Gabino Avelar agreed to this. Pt aware I inform her once its faxed.

## 2020-01-10 NOTE — PROGRESS NOTES
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 Complaints: 1. LT  Toes pain and numbness 4-5/10  2. Stress 7/10  HPI: Three years earlier patient ruptured achilles and after several surgeries pain and ROM improved.  The past 7-8 months s

## 2020-01-27 ENCOUNTER — TELEPHONE (OUTPATIENT)
Dept: PAIN CLINIC | Facility: CLINIC | Age: 55
End: 2020-01-27

## 2020-01-27 NOTE — TELEPHONE ENCOUNTER
LMTCB(We need to resched pt's appt on 2/7 with Dr Ata Milton.  There is time on hold on 1/31 to resched to,)

## 2020-02-03 ENCOUNTER — OFFICE VISIT (OUTPATIENT)
Dept: NEUROLOGY | Facility: CLINIC | Age: 55
End: 2020-02-03
Payer: COMMERCIAL

## 2020-02-03 ENCOUNTER — TELEPHONE (OUTPATIENT)
Dept: NEUROLOGY | Facility: CLINIC | Age: 55
End: 2020-02-03

## 2020-02-03 VITALS
WEIGHT: 185 LBS | DIASTOLIC BLOOD PRESSURE: 88 MMHG | SYSTOLIC BLOOD PRESSURE: 134 MMHG | RESPIRATION RATE: 16 BRPM | HEART RATE: 76 BPM | HEIGHT: 67 IN | BODY MASS INDEX: 29.03 KG/M2

## 2020-02-03 DIAGNOSIS — R29.898 LEFT LEG WEAKNESS: ICD-10-CM

## 2020-02-03 DIAGNOSIS — G57.42 LEFT TIBIAL NEUROPATHY: ICD-10-CM

## 2020-02-03 DIAGNOSIS — G62.9 NEUROPATHY: ICD-10-CM

## 2020-02-03 DIAGNOSIS — M79.2 NEUROPATHIC PAIN: Primary | ICD-10-CM

## 2020-02-03 DIAGNOSIS — G57.32 PERONEAL NEUROPATHY, LEFT: ICD-10-CM

## 2020-02-03 PROCEDURE — 99244 OFF/OP CNSLTJ NEW/EST MOD 40: CPT | Performed by: PHYSICAL MEDICINE & REHABILITATION

## 2020-02-03 RX ORDER — DULOXETIN HYDROCHLORIDE 30 MG/1
30 CAPSULE, DELAYED RELEASE ORAL DAILY
Qty: 30 CAPSULE | Refills: 0 | Status: SHIPPED | OUTPATIENT
Start: 2020-02-03 | End: 2020-06-26

## 2020-02-03 NOTE — TELEPHONE ENCOUNTER
[employer] for authorization of approval for MRI left ankle w/wo cpt code 08233 and or MRI lower leg w/wo cpt code  94097 Approval was given with same Authorization # [id number] effective 02/03/20 to 03/03/20   Will call Pt. To inform.  L/m advising of appro

## 2020-02-03 NOTE — H&P
Angella Varela 38    History and Physical    Stacia Hicks Patient Status:  No patient class for patient encounter    1965 MRN [id number]   Location [place] [address] Attending No att. providers found   [address] Day # • Palpitation    • Unspecified essential hypertension    • UTI (lower urinary tract infection)      Past Surgical History:   Procedure Laterality Date   •      • COLONOSCOPY & POLYPECTOMY  - repeat 10 yrs    polyp; tics; hyperplastic   • admits  Cold Extremities: admits(left foot at times.)   Skin  Open Sores: denies  Nodules or Lumps: denies  Rash: denies   Neurological  Loss of Strength Since last Visit: admits  Tingling/Numbness: admits  Balance: admits   Psychiatric  Anxiety: denies  D 07/17/2018    LIP 96 09/29/2017    ESRML 7 07/23/2019    CRP <0.29 07/23/2019    MG 2.2 10/25/2016    B12 449 07/17/2018     EMG results reviewed; left sural/peroneal/tibial axonal neuropathy.      Assessment/Plan:   1) neuropathic pain, weakness 2/2 left s

## 2020-02-04 ENCOUNTER — OFFICE VISIT (OUTPATIENT)
Dept: PHYSICAL THERAPY | Facility: HOSPITAL | Age: 55
End: 2020-02-04
Attending: PHYSICAL MEDICINE & REHABILITATION
Payer: COMMERCIAL

## 2020-02-04 DIAGNOSIS — G62.9 NEUROPATHY: ICD-10-CM

## 2020-02-04 DIAGNOSIS — G57.32 PERONEAL NEUROPATHY, LEFT: ICD-10-CM

## 2020-02-04 DIAGNOSIS — M79.2 NEUROPATHIC PAIN: ICD-10-CM

## 2020-02-04 DIAGNOSIS — G57.42 LEFT TIBIAL NEUROPATHY: ICD-10-CM

## 2020-02-04 DIAGNOSIS — R29.898 LEFT LEG WEAKNESS: ICD-10-CM

## 2020-02-04 PROCEDURE — 97140 MANUAL THERAPY 1/> REGIONS: CPT

## 2020-02-04 PROCEDURE — 97110 THERAPEUTIC EXERCISES: CPT

## 2020-02-04 PROCEDURE — 97162 PT EVAL MOD COMPLEX 30 MIN: CPT

## 2020-02-04 NOTE — PROGRESS NOTES
SPINE EVALUATION:   Referring Physician: Dr. 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 The results of the objective tests and measures show jt restrictions along the foot and ankle, deviated gait, adverse neural tension of peroneal and tibial nerves with sensor deficits of S1-2 on the L. Treatment and Response:   Pt education was provided on exam findings, treatment diagnosis, treatment plan, expectations, and prognosis.  Pt was also provided recommendations for activity modifications, possible soreness after evaluation, pain science educat return this letter via fax as soon as possible to [phone].  If you have any questions, please contact me at Dept: [phone]    Sincerely,  Electronically signed by therapist: Jes Mac PT    Physician's certification required: Yes  I certify th

## 2020-02-05 ENCOUNTER — MED REC SCAN ONLY (OUTPATIENT)
Dept: NEUROLOGY | Facility: CLINIC | Age: 55
End: 2020-02-05

## 2020-02-14 ENCOUNTER — OFFICE VISIT (OUTPATIENT)
Dept: PHYSICAL THERAPY | Facility: HOSPITAL | Age: 55
End: 2020-02-14
Attending: PHYSICAL MEDICINE & REHABILITATION
Payer: COMMERCIAL

## 2020-02-14 DIAGNOSIS — M79.2 NEUROPATHIC PAIN: ICD-10-CM

## 2020-02-14 DIAGNOSIS — G57.42 LEFT TIBIAL NEUROPATHY: ICD-10-CM

## 2020-02-14 DIAGNOSIS — G57.32 PERONEAL NEUROPATHY, LEFT: ICD-10-CM

## 2020-02-14 DIAGNOSIS — G62.9 NEUROPATHY: ICD-10-CM

## 2020-02-14 DIAGNOSIS — R29.898 LEFT LEG WEAKNESS: ICD-10-CM

## 2020-02-14 PROCEDURE — 97140 MANUAL THERAPY 1/> REGIONS: CPT

## 2020-02-14 PROCEDURE — 97110 THERAPEUTIC EXERCISES: CPT

## 2020-02-18 ENCOUNTER — HOSPITAL ENCOUNTER (OUTPATIENT)
Dept: MRI IMAGING | Age: 55
Discharge: HOME | End: 2020-02-18
Attending: PHYSICAL MEDICINE & REHABILITATION
Payer: COMMERCIAL

## 2020-02-18 ENCOUNTER — TELEPHONE (OUTPATIENT)
Dept: NEUROLOGY | Facility: CLINIC | Age: 55
End: 2020-02-18

## 2020-02-18 DIAGNOSIS — R29.898 LEFT LEG WEAKNESS: Primary | ICD-10-CM

## 2020-02-18 DIAGNOSIS — R29.898 LEFT LEG WEAKNESS: ICD-10-CM

## 2020-02-18 NOTE — TELEPHONE ENCOUNTER
Should be MRI of the left lower leg and ankle (2 separate orders). Both orders have been placed, just added on the left ankle.

## 2020-02-18 NOTE — PROGRESS NOTES
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                   Charges: MT 2, EX 2       Total Timed Treatment: MT 20 min, Ex 25 min  Total Treatment Time: 45 min

## 2020-02-18 NOTE — TELEPHONE ENCOUNTER
Spoke with Sunshine Madrigal from [address] [facility], [phone] states the patient was upset because she thought she was suppose to have an MRI of her foot, Spoke with patient, states she cancelled her MRI today, Please advise if patient should have MRI of her lowe

## 2020-02-19 ENCOUNTER — TELEPHONE (OUTPATIENT)
Dept: NEUROLOGY | Facility: CLINIC | Age: 55
End: 2020-02-19

## 2020-02-19 ENCOUNTER — OFFICE VISIT (OUTPATIENT)
Dept: PHYSICAL THERAPY | Facility: HOSPITAL | Age: 55
End: 2020-02-19
Attending: PHYSICAL MEDICINE & REHABILITATION
Payer: COMMERCIAL

## 2020-02-19 PROCEDURE — 97140 MANUAL THERAPY 1/> REGIONS: CPT

## 2020-02-19 PROCEDURE — 97110 THERAPEUTIC EXERCISES: CPT

## 2020-02-19 NOTE — PROGRESS NOTES
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

## 2020-02-19 NOTE — TELEPHONE ENCOUNTER
Informed patient MRI L Ankle was ordered to view the foot and that our referral team will be reaching out to her insurance for approval. Patient verbalized understanding.

## 2020-02-19 NOTE — TELEPHONE ENCOUNTER
Called [facility] PPO for authorization of approval on MRI ANKLE, LEFT (W+WO) (CPT=73723) Spoke to [place].  who informed that prior authorization/predetermination is NOT required. Reference#Gaile. H2/19/20. Will inform patient. Patient informed .  Can proceed to

## 2020-02-25 ENCOUNTER — HOSPITAL ENCOUNTER (OUTPATIENT)
Dept: MRI IMAGING | Age: 55
Discharge: HOME | End: 2020-02-25
Attending: PHYSICAL MEDICINE & REHABILITATION
Payer: COMMERCIAL

## 2020-02-25 DIAGNOSIS — R29.898 LEFT LEG WEAKNESS: ICD-10-CM

## 2020-02-25 LAB — CREAT BLD-MCNC: 1 MG/DL (ref 0.55–1.02)

## 2020-02-25 PROCEDURE — A9575 INJ GADOTERATE MEGLUMI 0.1ML: HCPCS | Performed by: PHYSICAL MEDICINE & REHABILITATION

## 2020-02-25 PROCEDURE — 73720 MRI LWR EXTREMITY W/O&W/DYE: CPT | Performed by: PHYSICAL MEDICINE & REHABILITATION

## 2020-02-25 PROCEDURE — 73723 MRI JOINT LWR EXTR W/O&W/DYE: CPT | Performed by: PHYSICAL MEDICINE & REHABILITATION

## 2020-02-25 PROCEDURE — 82565 ASSAY OF CREATININE: CPT

## 2020-02-27 ENCOUNTER — TELEPHONE (OUTPATIENT)
Dept: NEUROLOGY | Facility: CLINIC | Age: 55
End: 2020-02-27

## 2020-02-27 ENCOUNTER — OFFICE VISIT (OUTPATIENT)
Dept: PHYSICAL THERAPY | Facility: HOSPITAL | Age: 55
End: 2020-02-27
Attending: PHYSICAL MEDICINE & REHABILITATION
Payer: COMMERCIAL

## 2020-02-27 DIAGNOSIS — G62.9 NEUROPATHY: ICD-10-CM

## 2020-02-27 DIAGNOSIS — G57.32 PERONEAL NEUROPATHY, LEFT: ICD-10-CM

## 2020-02-27 DIAGNOSIS — G57.42 LEFT TIBIAL NEUROPATHY: ICD-10-CM

## 2020-02-27 DIAGNOSIS — R29.898 LEFT LEG WEAKNESS: ICD-10-CM

## 2020-02-27 DIAGNOSIS — M79.2 NEUROPATHIC PAIN: ICD-10-CM

## 2020-02-27 PROCEDURE — 97110 THERAPEUTIC EXERCISES: CPT

## 2020-02-27 PROCEDURE — 97140 MANUAL THERAPY 1/> REGIONS: CPT

## 2020-02-27 NOTE — TELEPHONE ENCOUNTER
Informed patient of imaging results and recommendation per Dr. Rosalie York    Patient states she is in PT and had a session with [facility] today. Patient did not start Cymbalta. States she informed Dr. Rosalie York that she does not want to use an antidepressant.  Infor

## 2020-02-27 NOTE — TELEPHONE ENCOUNTER
----- Message from Arias Bruce DO sent at 2/27/2020  7:31 AM CST -----  Please let the patient know the achilles graft looks intact; some of the muscles of the leg look smaller than they should be.  Please ask her how she is doing with the duloxetine and

## 2020-02-27 NOTE — PROGRESS NOTES
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

## 2020-02-28 NOTE — TELEPHONE ENCOUNTER
I do not think there is an injection that is appropriate for her condition; my opinion is that she should be started on a neuropathic medication (such as duloxetine) to help control the symptoms and continue with [facility] for physical therapy.  If she would li

## 2020-03-02 NOTE — TELEPHONE ENCOUNTER
Informed patient of Dr. Marquez Fagan recommendations. Patient states she will contact either Dr. Janice Wilson or Dr. Pari Freitas for a second opinion.

## 2020-03-23 ENCOUNTER — TELEPHONE (OUTPATIENT)
Dept: PAIN CLINIC | Facility: CLINIC | Age: 55
End: 2020-03-23

## 2020-03-23 DIAGNOSIS — M79.2 NEURALGIA AND NEURITIS: ICD-10-CM

## 2020-03-23 RX ORDER — IBUPROFEN 800 MG/1
800 TABLET ORAL 3 TIMES DAILY
Qty: 270 TABLET | Refills: 0 | Status: SHIPPED | OUTPATIENT
Start: 2020-03-23 | End: 2020-06-26

## 2020-03-23 NOTE — TELEPHONE ENCOUNTER
Medication(s) to Refill:   Requested Prescriptions     Pending Prescriptions Disp Refills   • ibuprofen 800 MG Oral Tab 270 tablet 0     Sig: Take 1 tablet (800 mg total) by mouth 3 (three) times daily.          Reason for Medication Refill being sent to Pr

## 2020-03-23 NOTE — TELEPHONE ENCOUNTER
Patient advised of need to postpone upcoming appointment, patient agreeable, appointment rescheduled for future date.

## 2020-04-02 ENCOUNTER — TELEPHONE (OUTPATIENT)
Dept: PHYSICAL MEDICINE AND REHAB | Facility: CLINIC | Age: 55
End: 2020-04-02

## 2020-04-02 NOTE — TELEPHONE ENCOUNTER
Patient advised of need to reschedule appointment due to current community health concerns, patient agreeable, appointment rescheduled.

## 2020-06-09 ENCOUNTER — TELEMEDICINE (OUTPATIENT)
Dept: NEUROLOGY | Facility: CLINIC | Age: 55
End: 2020-06-09

## 2020-06-09 DIAGNOSIS — M79.2 NEUROPATHIC PAIN: Primary | ICD-10-CM

## 2020-06-09 DIAGNOSIS — G57.42 LEFT TIBIAL NEUROPATHY: ICD-10-CM

## 2020-06-09 PROCEDURE — 99214 OFFICE O/P EST MOD 30 MIN: CPT | Performed by: PHYSICAL MEDICINE & REHABILITATION

## 2020-06-09 RX ORDER — CELECOXIB 100 MG/1
100 CAPSULE ORAL 2 TIMES DAILY PRN
Qty: 60 CAPSULE | Refills: 0 | Status: SHIPPED | OUTPATIENT
Start: 2020-06-09 | End: 2020-07-02

## 2020-06-09 NOTE — PROGRESS NOTES
[address] [facility]    Telemedicine Visit - Follow up    José Gonzalez verbally consents to a Telemedicine Visit on 06/09/20.  This visit is conducted using Telemedicine with live, interactive audio and video usi gain.  She did do acupuncture in the past with Dr. Carmen Puckett through [facility], but stopped the acupuncture has she was unable to schedule at a time that worked for her.     PAST MEDICAL HISTORY:     Past Medical History:   Diagnosis Date   • Acne    • Back pain embolism) Mother    • Breast Cancer Sister 54          SOCIAL HISTORY:   Social History    Tobacco Use      Smoking status: Former Smoker        Packs/day: 0.00        Quit date: 10/24/1996        Years since quittin.6      Smokeless tobacco: Never Us from Achilles tendon repair. The tendon graft is intact and well seated on the calcaneal insertion. Anchors are seen within the posterior calcaneus. There is possible thickening as well as fibrillation involving the   tendon graft.   No definitive intras =====  CONCLUSION:    1. Postoperative changes from Achilles tendon repair. The Achilles tendon graft is intact. 2. Mild asymmetric fatty atrophy of the left gastrocnemius and soleus musculature compared to the right side.   There is possible denervatio

## 2020-06-10 ENCOUNTER — TELEPHONE (OUTPATIENT)
Dept: NEUROLOGY | Facility: CLINIC | Age: 55
End: 2020-06-10

## 2020-06-26 ENCOUNTER — OFFICE VISIT (OUTPATIENT)
Dept: FAMILY MEDICINE CLINIC | Facility: CLINIC | Age: 55
End: 2020-06-26
Payer: COMMERCIAL

## 2020-06-26 VITALS
TEMPERATURE: 99 F | HEART RATE: 76 BPM | SYSTOLIC BLOOD PRESSURE: 124 MMHG | HEIGHT: 67 IN | OXYGEN SATURATION: 98 % | WEIGHT: 210 LBS | DIASTOLIC BLOOD PRESSURE: 80 MMHG | BODY MASS INDEX: 32.96 KG/M2 | RESPIRATION RATE: 16 BRPM

## 2020-06-26 DIAGNOSIS — E66.9 OBESITY (BMI 30-39.9): ICD-10-CM

## 2020-06-26 DIAGNOSIS — M79.2 NEURALGIA AND NEURITIS: ICD-10-CM

## 2020-06-26 DIAGNOSIS — Z00.00 ROUTINE PHYSICAL EXAMINATION: Primary | ICD-10-CM

## 2020-06-26 DIAGNOSIS — M62.562 ATROPHY OF MUSCLE OF LEFT LOWER LEG: ICD-10-CM

## 2020-06-26 DIAGNOSIS — G62.9 NEUROPATHY: ICD-10-CM

## 2020-06-26 DIAGNOSIS — Z00.00 LABORATORY EXAM ORDERED AS PART OF ROUTINE GENERAL MEDICAL EXAMINATION: ICD-10-CM

## 2020-06-26 PROCEDURE — 99396 PREV VISIT EST AGE 40-64: CPT | Performed by: FAMILY MEDICINE

## 2020-06-26 NOTE — PROGRESS NOTES
Malia Jimenes is a 49year old female.     CC:  Patient presents with:  Physical      HPI:  Mammogram due on 02/07/2020  Annual Depression Screen due on 01/06/2021  Annual Physical due on 06/26/2021  Pap Smear,3 Years due on 01/07/2022  Colonoscopy d History:   Diagnosis Date   • Acne    • Back pain    • Dysmenorrhea    • Hematuria    • Palpitation    • Unspecified essential hypertension    • UTI (lower urinary tract infection)       Past Surgical History:   Procedure Laterality Date   •   198 RESPIRATORY: denies shortness of breath, wheezing or cough   CARDIOVASCULAR: denies chest pain, edema, orthopnea, or palpitations   GI: denies nausea, vomiting, constipation, diarrhea, hematochezia, melena, heartburn  : no dysuria, urgency or frequency INTERNAL REFERRAL    6. Atrophy of muscle of left lower leg  Cont supportive care       Recommended low fat diet and aerobic exercise 30 minutes five times weekly.     Meds & Refills for this Visit:  Requested Prescriptions      No prescriptions requested o

## 2020-07-01 ENCOUNTER — LAB ENCOUNTER (OUTPATIENT)
Dept: LAB | Age: 55
End: 2020-07-01
Attending: FAMILY MEDICINE
Payer: COMMERCIAL

## 2020-07-01 DIAGNOSIS — Z00.00 LABORATORY EXAM ORDERED AS PART OF ROUTINE GENERAL MEDICAL EXAMINATION: ICD-10-CM

## 2020-07-01 DIAGNOSIS — E66.9 OBESITY (BMI 30-39.9): ICD-10-CM

## 2020-07-01 LAB
ALBUMIN SERPL-MCNC: 3.7 G/DL (ref 3.4–5)
ALBUMIN/GLOB SERPL: 1.1 {RATIO} (ref 1–2)
ALP LIVER SERPL-CCNC: 101 U/L (ref 41–108)
ALT SERPL-CCNC: 28 U/L (ref 13–56)
ANION GAP SERPL CALC-SCNC: 2 MMOL/L (ref 0–18)
AST SERPL-CCNC: 21 U/L (ref 15–37)
BASOPHILS # BLD AUTO: 0.04 X10(3) UL (ref 0–0.2)
BASOPHILS NFR BLD AUTO: 0.8 %
BILIRUB SERPL-MCNC: 0.4 MG/DL (ref 0.1–2)
BUN BLD-MCNC: 15 MG/DL (ref 7–18)
BUN/CREAT SERPL: 18.3 (ref 10–20)
CALCIUM BLD-MCNC: 9 MG/DL (ref 8.5–10.1)
CHLORIDE SERPL-SCNC: 108 MMOL/L (ref 98–112)
CHOLEST SMN-MCNC: 223 MG/DL (ref ?–200)
CO2 SERPL-SCNC: 29 MMOL/L (ref 21–32)
CREAT BLD-MCNC: 0.82 MG/DL (ref 0.55–1.02)
DEPRECATED RDW RBC AUTO: 42.1 FL (ref 35.1–46.3)
EOSINOPHIL # BLD AUTO: 0.15 X10(3) UL (ref 0–0.7)
EOSINOPHIL NFR BLD AUTO: 2.9 %
ERYTHROCYTE [DISTWIDTH] IN BLOOD BY AUTOMATED COUNT: 12.3 % (ref 11–15)
EST. AVERAGE GLUCOSE BLD GHB EST-MCNC: 103 MG/DL (ref 68–126)
GLOBULIN PLAS-MCNC: 3.4 G/DL (ref 2.8–4.4)
GLUCOSE BLD-MCNC: 107 MG/DL (ref 70–99)
HBA1C MFR BLD HPLC: 5.2 % (ref ?–5.7)
HCT VFR BLD AUTO: 40.5 % (ref 35–48)
HDLC SERPL-MCNC: 42 MG/DL (ref 40–59)
HGB BLD-MCNC: 13.3 G/DL (ref 12–16)
IMM GRANULOCYTES # BLD AUTO: 0.01 X10(3) UL (ref 0–1)
IMM GRANULOCYTES NFR BLD: 0.2 %
LDLC SERPL CALC-MCNC: 138 MG/DL (ref ?–100)
LYMPHOCYTES # BLD AUTO: 2.13 X10(3) UL (ref 1–4)
LYMPHOCYTES NFR BLD AUTO: 40.7 %
M PROTEIN MFR SERPL ELPH: 7.1 G/DL (ref 6.4–8.2)
MCH RBC QN AUTO: 30.4 PG (ref 26–34)
MCHC RBC AUTO-ENTMCNC: 32.8 G/DL (ref 31–37)
MCV RBC AUTO: 92.5 FL (ref 80–100)
MONOCYTES # BLD AUTO: 0.48 X10(3) UL (ref 0.1–1)
MONOCYTES NFR BLD AUTO: 9.2 %
NEUTROPHILS # BLD AUTO: 2.42 X10 (3) UL (ref 1.5–7.7)
NEUTROPHILS # BLD AUTO: 2.42 X10(3) UL (ref 1.5–7.7)
NEUTROPHILS NFR BLD AUTO: 46.2 %
NONHDLC SERPL-MCNC: 181 MG/DL (ref ?–130)
OSMOLALITY SERPL CALC.SUM OF ELEC: 289 MOSM/KG (ref 275–295)
PATIENT FASTING Y/N/NP: YES
PATIENT FASTING Y/N/NP: YES
PLATELET # BLD AUTO: 240 10(3)UL (ref 150–450)
POTASSIUM SERPL-SCNC: 4.3 MMOL/L (ref 3.5–5.1)
RBC # BLD AUTO: 4.38 X10(6)UL (ref 3.8–5.3)
SODIUM SERPL-SCNC: 139 MMOL/L (ref 136–145)
TRIGL SERPL-MCNC: 214 MG/DL (ref 30–149)
TSI SER-ACNC: 2 MIU/ML (ref 0.36–3.74)
VLDLC SERPL CALC-MCNC: 43 MG/DL (ref 0–30)
WBC # BLD AUTO: 5.2 X10(3) UL (ref 4–11)

## 2020-07-01 PROCEDURE — 80061 LIPID PANEL: CPT | Performed by: FAMILY MEDICINE

## 2020-07-01 PROCEDURE — 36415 COLL VENOUS BLD VENIPUNCTURE: CPT | Performed by: FAMILY MEDICINE

## 2020-07-01 PROCEDURE — 80050 GENERAL HEALTH PANEL: CPT | Performed by: FAMILY MEDICINE

## 2020-07-01 PROCEDURE — 83036 HEMOGLOBIN GLYCOSYLATED A1C: CPT | Performed by: FAMILY MEDICINE

## 2020-07-02 ENCOUNTER — TELEPHONE (OUTPATIENT)
Dept: NEUROLOGY | Facility: CLINIC | Age: 55
End: 2020-07-02

## 2020-07-02 DIAGNOSIS — M79.2 NEUROPATHIC PAIN: ICD-10-CM

## 2020-07-02 RX ORDER — CELECOXIB 100 MG/1
100 CAPSULE ORAL 2 TIMES DAILY PRN
Qty: 60 CAPSULE | Refills: 0 | Status: SHIPPED | OUTPATIENT
Start: 2020-07-02 | End: 2020-08-04

## 2020-07-02 NOTE — TELEPHONE ENCOUNTER
Medication request: celebrex 100mg oral Cap, #60, no refills  Take 1 capsule by mouth BID PRN    LOV: 6/9/20  NOV: Not yet scheduled

## 2020-07-10 ENCOUNTER — PATIENT MESSAGE (OUTPATIENT)
Dept: FAMILY MEDICINE CLINIC | Facility: CLINIC | Age: 55
End: 2020-07-10

## 2020-07-10 RX ORDER — METFORMIN HYDROCHLORIDE 500 MG/1
500 TABLET, EXTENDED RELEASE ORAL DAILY
Qty: 30 TABLET | Refills: 1 | Status: SHIPPED | OUTPATIENT
Start: 2020-07-10 | End: 2020-12-15

## 2020-07-10 NOTE — TELEPHONE ENCOUNTER
Patient is following up to her message I sent regarding lab results.    She can restart metformin - would recommend 500mg ER daily to start  we can wait to start cholesterol medicaiton to see if weight loss improves cholesterol   If there is no improvement

## 2020-07-10 NOTE — TELEPHONE ENCOUNTER
From: Rachel Hernadez  To: Suzanne Zamarripa MD  Sent: 7/10/2020 8:49 AM CDT  Subject: Test Results Question    Good morning     With the elevation of my cholesterol do you think it's necessary to put me on medication for that?  I'm more concerned about my we

## 2020-07-15 ENCOUNTER — MED REC SCAN ONLY (OUTPATIENT)
Dept: NEUROLOGY | Facility: CLINIC | Age: 55
End: 2020-07-15

## 2020-08-04 DIAGNOSIS — M79.2 NEUROPATHIC PAIN: ICD-10-CM

## 2020-08-05 NOTE — TELEPHONE ENCOUNTER
Refill request for celebrex 100 mg, BID PRN, #60, no refills    LOV: 6/9/20  NOV: None  Last refilled on 7/2/20

## 2020-08-06 RX ORDER — CELECOXIB 100 MG/1
100 CAPSULE ORAL 2 TIMES DAILY PRN
Qty: 60 CAPSULE | Refills: 0 | Status: SHIPPED | OUTPATIENT
Start: 2020-08-06 | End: 2020-09-12

## 2020-08-27 ENCOUNTER — PATIENT MESSAGE (OUTPATIENT)
Dept: FAMILY MEDICINE CLINIC | Facility: CLINIC | Age: 55
End: 2020-08-27

## 2020-08-27 DIAGNOSIS — I10 HYPERTENSION, UNSPECIFIED TYPE: ICD-10-CM

## 2020-08-27 RX ORDER — HYDROCHLOROTHIAZIDE 25 MG/1
TABLET ORAL
Qty: 90 TABLET | Refills: 0 | Status: SHIPPED | OUTPATIENT
Start: 2020-08-27 | End: 2020-11-03

## 2020-08-27 RX ORDER — HYDROCHLOROTHIAZIDE 25 MG/1
TABLET ORAL
Qty: 90 TABLET | Refills: 0 | OUTPATIENT
Start: 2020-08-27

## 2020-08-27 NOTE — TELEPHONE ENCOUNTER
Msg sent to pt via [employer] confirming she has been taking this medication daily. LF noted to be May 2019.          Medication(s) to Refill:   Requested Prescriptions     Pending Prescriptions Disp Refills   • hydrochlorothiazide 25 MG Oral Tab 90 tablet 0

## 2020-08-27 NOTE — TELEPHONE ENCOUNTER
From: Rachel Hernadez  To: Suzanne Zamarripa MD  Sent: 8/27/2020 11:11 AM CDT  Subject: Prescription Question    Hello I am out of hydrochlorthiazidide and sent a refill request thru you to send to [facility], and it was denied. Can I get this refilled?

## 2020-09-12 DIAGNOSIS — M79.2 NEUROPATHIC PAIN: ICD-10-CM

## 2020-09-14 RX ORDER — CELECOXIB 100 MG/1
100 CAPSULE ORAL 2 TIMES DAILY PRN
Qty: 60 CAPSULE | Refills: 0 | Status: SHIPPED | OUTPATIENT
Start: 2020-09-14 | End: 2020-11-03

## 2020-09-14 NOTE — TELEPHONE ENCOUNTER
Medication request: Celecoxib 100mg 1 CAP BID PRN PAIN    LOV: 06/09/20  NOV: none    [place] 08[place]06/20 #60 r-0

## 2020-10-28 NOTE — PATIENT INSTRUCTIONS
Refill policies:    • Allow 2-3 business days for refills; controlled substances may take longer.   • Contact your pharmacy at least 5 days prior to running out of medication and have them send an electronic request or submit request through the “request re Depending on your insurance carrier, approval may take 3-10 days. It is highly recommended patients contact their insurance carrier directly to determine coverage.   If test is done without insurance authorization, patient may be responsible for the entire Additional Notes: Patient consent was obtained to proceed with the visit and recommended plan of care after discussion of all risks and benefits, including the risks of COVID-19 exposure. Detail Level: Simple

## 2020-11-03 ENCOUNTER — TELEPHONE (OUTPATIENT)
Dept: NEUROLOGY | Facility: CLINIC | Age: 55
End: 2020-11-03

## 2020-11-03 DIAGNOSIS — M79.2 NEUROPATHIC PAIN: ICD-10-CM

## 2020-11-03 DIAGNOSIS — I10 HYPERTENSION, UNSPECIFIED TYPE: ICD-10-CM

## 2020-11-03 RX ORDER — HYDROCHLOROTHIAZIDE 25 MG/1
TABLET ORAL
Qty: 90 TABLET | Refills: 0 | Status: SHIPPED | OUTPATIENT
Start: 2020-11-03 | End: 2021-01-06

## 2020-11-03 RX ORDER — CELECOXIB 100 MG/1
100 CAPSULE ORAL 2 TIMES DAILY PRN
Qty: 60 CAPSULE | Refills: 0 | Status: SHIPPED | OUTPATIENT
Start: 2020-11-03 | End: 2020-12-09

## 2020-11-03 NOTE — TELEPHONE ENCOUNTER
Scheduled a follow up appointment with dr Yola Aparicio, is asking for a refill of the celebrex prior to the appointmen

## 2020-11-03 NOTE — TELEPHONE ENCOUNTER
Left a message for patient to call the office back with an update how she is doing with the medication and how her pain is doing. If patient had therapy? Medication request: celecoxib 100 mg oral cap.  Take 1 capsule by mouth 2 times daily as needed fo

## 2020-11-12 ENCOUNTER — OFFICE VISIT (OUTPATIENT)
Dept: INTEGRATIVE MEDICINE | Facility: CLINIC | Age: 55
End: 2020-11-12
Payer: COMMERCIAL

## 2020-11-12 ENCOUNTER — OFFICE VISIT (OUTPATIENT)
Dept: NEUROLOGY | Facility: CLINIC | Age: 55
End: 2020-11-12
Payer: COMMERCIAL

## 2020-11-12 VITALS — WEIGHT: 205 LBS | BODY MASS INDEX: 32.18 KG/M2 | HEIGHT: 67 IN

## 2020-11-12 DIAGNOSIS — M79.605 LEFT LEG PAIN: Primary | ICD-10-CM

## 2020-11-12 DIAGNOSIS — M54.16 LEFT LUMBAR RADICULITIS: Primary | ICD-10-CM

## 2020-11-12 DIAGNOSIS — G62.9 NEUROPATHY: ICD-10-CM

## 2020-11-12 DIAGNOSIS — M79.2 NEUROPATHIC PAIN: ICD-10-CM

## 2020-11-12 PROCEDURE — 97811 ACUP 1/> W/O ESTIM EA ADD 15: CPT | Performed by: ACUPUNCTURIST

## 2020-11-12 PROCEDURE — 99072 ADDL SUPL MATRL&STAF TM PHE: CPT | Performed by: PHYSICAL MEDICINE & REHABILITATION

## 2020-11-12 PROCEDURE — 3008F BODY MASS INDEX DOCD: CPT | Performed by: PHYSICAL MEDICINE & REHABILITATION

## 2020-11-12 PROCEDURE — 97810 ACUP 1/> WO ESTIM 1ST 15 MIN: CPT | Performed by: ACUPUNCTURIST

## 2020-11-12 PROCEDURE — 99214 OFFICE O/P EST MOD 30 MIN: CPT | Performed by: PHYSICAL MEDICINE & REHABILITATION

## 2020-11-12 RX ORDER — DIAZEPAM 5 MG/1
TABLET ORAL
Qty: 3 TABLET | Refills: 0 | Status: SHIPPED | OUTPATIENT
Start: 2020-11-12 | End: 2020-12-15 | Stop reason: ALTCHOICE

## 2020-11-12 NOTE — PROGRESS NOTES
HPI:    Patient ID: Beau Mccord is a 48year old female. 48year old female presents for follow up. Symptoms essentially unchanged.   She also reports constant back stiffness and discomfort, along with intermittent pain in the lateral aspect of th daily. (Patient not taking: Reported on 11/12/2020 ) 30 tablet 1     Allergies:No Known Allergies   PHYSICAL EXAM:   Physical Exam   Constitutional: She appears well-developed and well-nourished. HENT:   Head: Normocephalic and atraumatic.    Eyes: [place]

## 2020-11-12 NOTE — PATIENT INSTRUCTIONS
If Doctor has ordered an injection or MRI and if you do not hear from us within 3 business days please call the office or send a message through [facility] and ask if the injection/MRI has been approved. Increase the celebrex to 200mg in the morning.  If th

## 2020-11-13 ENCOUNTER — MED REC SCAN ONLY (OUTPATIENT)
Dept: NEUROLOGY | Facility: CLINIC | Age: 55
End: 2020-11-13

## 2020-11-13 NOTE — PROGRESS NOTES
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 Complaints: 1. LT  Toes pain and numbness 7-8/10  2.  LBP radiating down the left side 5/10  2. Stress 7/10  HPI: Three years earlier patient ruptured achilles and after several surgeries marta

## 2020-11-23 ENCOUNTER — PATIENT MESSAGE (OUTPATIENT)
Dept: NEUROLOGY | Facility: CLINIC | Age: 55
End: 2020-11-23

## 2020-11-23 ENCOUNTER — TELEPHONE (OUTPATIENT)
Dept: NEUROLOGY | Facility: CLINIC | Age: 55
End: 2020-11-23

## 2020-11-23 DIAGNOSIS — M54.16 LEFT LUMBAR RADICULITIS: Primary | ICD-10-CM

## 2020-11-23 NOTE — TELEPHONE ENCOUNTER
From: [place]  To:  Manuela Rosado,   Sent: 11/23/2020 12:03 PM CST  Subject: Visit Follow-up Question    Good morning-    I contacted customer service about my MRI, they said there is no order on file for one- at my last appointment I was presc

## 2020-11-23 NOTE — TELEPHONE ENCOUNTER
[employer] for authorization of approval for MRI L-spine wo cpt code 68657. Approval was given with Authorization # [id number] effective 11/23/20 to 12/22/20. Will call Pt. To inform. Called Pt. Mailbox is full, cannot L/m. Await return call to inform.

## 2020-12-08 ENCOUNTER — HOSPITAL ENCOUNTER (OUTPATIENT)
Dept: MRI IMAGING | Age: 55
Discharge: HOME | End: 2020-12-08
Attending: PHYSICAL MEDICINE & REHABILITATION
Payer: COMMERCIAL

## 2020-12-08 ENCOUNTER — PATIENT MESSAGE (OUTPATIENT)
Dept: NEUROLOGY | Facility: CLINIC | Age: 55
End: 2020-12-08

## 2020-12-08 DIAGNOSIS — M54.16 LEFT LUMBAR RADICULITIS: ICD-10-CM

## 2020-12-08 DIAGNOSIS — M79.2 NEUROPATHIC PAIN: ICD-10-CM

## 2020-12-08 PROCEDURE — 72148 MRI LUMBAR SPINE W/O DYE: CPT | Performed by: PHYSICAL MEDICINE & REHABILITATION

## 2020-12-09 ENCOUNTER — TELEPHONE (OUTPATIENT)
Dept: NEUROLOGY | Facility: CLINIC | Age: 55
End: 2020-12-09

## 2020-12-09 DIAGNOSIS — M79.2 NEUROPATHIC PAIN: ICD-10-CM

## 2020-12-09 RX ORDER — CELECOXIB 100 MG/1
100 CAPSULE ORAL 2 TIMES DAILY PRN
Qty: 60 CAPSULE | Refills: 0 | Status: SHIPPED | OUTPATIENT
Start: 2020-12-09 | End: 2021-01-06

## 2020-12-09 RX ORDER — CELECOXIB 100 MG/1
100 CAPSULE ORAL 2 TIMES DAILY PRN
Qty: 60 CAPSULE | Refills: 0 | OUTPATIENT
Start: 2020-12-09

## 2020-12-09 NOTE — TELEPHONE ENCOUNTER
S/W patient and she verbalized she understood the MRI results. She would like a follow up visit with Dr. Azul Masters.

## 2020-12-09 NOTE — TELEPHONE ENCOUNTER
Medication request: Celecoxib 100mg 1 CAP BID PRN    LOV: 11/12/20  NOV: none    [place] 11[place]03/20 #60 r-0

## 2020-12-09 NOTE — TELEPHONE ENCOUNTER
From: Karime Corona  To: Hoyt Councilman, DO  Sent: 12/8/2020 7:08 PM CST  Subject: Test Results Question    Good evening-  I received my MRI results today, and I understand Dr Yola Aparicio may have not reviewed them yet.  Please advise of next steps, I would

## 2020-12-09 NOTE — TELEPHONE ENCOUNTER
----- Message from Vinita Roque DO sent at 12/9/2020 10:16 AM CST -----  Please let the patient know that there is a disc bulge at the lower level of her back that is small, but might be causing some of her symptoms.  She should follow up for re-evaluatio

## 2020-12-09 NOTE — TELEPHONE ENCOUNTER
See MRI note. If she is able to come in I would like to physically see her for follow up. Will prescribe celebrex in the meantime.

## 2020-12-15 ENCOUNTER — OFFICE VISIT (OUTPATIENT)
Dept: NEUROLOGY | Facility: CLINIC | Age: 55
End: 2020-12-15
Payer: COMMERCIAL

## 2020-12-15 ENCOUNTER — TELEPHONE (OUTPATIENT)
Dept: NEUROLOGY | Facility: CLINIC | Age: 55
End: 2020-12-15

## 2020-12-15 VITALS — HEIGHT: 67 IN | WEIGHT: 210 LBS | BODY MASS INDEX: 32.96 KG/M2

## 2020-12-15 DIAGNOSIS — M54.16 LEFT LUMBAR RADICULITIS: Primary | ICD-10-CM

## 2020-12-15 PROCEDURE — 99214 OFFICE O/P EST MOD 30 MIN: CPT | Performed by: PHYSICAL MEDICINE & REHABILITATION

## 2020-12-15 PROCEDURE — 3008F BODY MASS INDEX DOCD: CPT | Performed by: PHYSICAL MEDICINE & REHABILITATION

## 2020-12-15 NOTE — PATIENT INSTRUCTIONS
Please hold celebrex 24 hours before procedure. We will call you to schedule the procedure once it has been approved.  If you do not hear from us by Friday please give us a phone call or send a message through [address]    Since we are doing this procedure

## 2020-12-15 NOTE — TELEPHONE ENCOUNTER
Contacted Maria E Marinelli at [employer] Case/Reference # [id number] to initiate authorization for Left L5 TFESI CPT 84819 dx:M54.16 to be done at [facility].     Status: Approved with authorization #[id number]    Routing to clinical staff to schedule injection at

## 2020-12-16 NOTE — TELEPHONE ENCOUNTER
Patient has been scheduled for a Left L5 transforaminal epidural steroid injection under fluoroscopy, local  on 12/23/2020 at the [facility]. Medications and allergies reviewed.  Patient informed to hold aspirins, nsaids, blood thinners, multivitamins, vitamin E

## 2020-12-18 ENCOUNTER — MED REC SCAN ONLY (OUTPATIENT)
Dept: NEUROLOGY | Facility: CLINIC | Age: 55
End: 2020-12-18

## 2020-12-23 ENCOUNTER — OFFICE VISIT (OUTPATIENT)
Dept: SURGERY | Facility: CLINIC | Age: 55
End: 2020-12-23

## 2020-12-23 DIAGNOSIS — M54.16 LEFT LUMBAR RADICULITIS: Primary | ICD-10-CM

## 2020-12-23 PROCEDURE — 64483 NJX AA&/STRD TFRM EPI L/S 1: CPT | Performed by: PHYSICAL MEDICINE & REHABILITATION

## 2020-12-23 NOTE — PROCEDURES
[facility]    LUMBAR TRANSFORAMINAL   NAME:  Coral Ngay    MR #:    [id number] :  1965     PHYSICIAN:  Claire Lugo        Operative Report    DATE OF PROCEDURE: 2020   PREOPERATIVE DIAGNOSES: 1.  Left lumbar r procedure, the patient's pulse oximetry and vital signs were monitored and they remained completely stable. Also, throughout the whole procedure, prior to injection of any medication, aspiration was performed.   No blood, fluid, or air was aspirated at any

## 2020-12-24 ENCOUNTER — TELEPHONE (OUTPATIENT)
Dept: NEUROLOGY | Facility: CLINIC | Age: 55
End: 2020-12-24

## 2021-01-06 DIAGNOSIS — M79.2 NEUROPATHIC PAIN: ICD-10-CM

## 2021-01-06 DIAGNOSIS — I10 HYPERTENSION, UNSPECIFIED TYPE: ICD-10-CM

## 2021-01-06 RX ORDER — CELECOXIB 100 MG/1
100 CAPSULE ORAL 2 TIMES DAILY PRN
Qty: 60 CAPSULE | Refills: 0 | Status: SHIPPED | OUTPATIENT
Start: 2021-01-06 | End: 2021-02-20

## 2021-01-06 NOTE — TELEPHONE ENCOUNTER
Spoke to patient state there is no pain in lower back. It eased the discomfort in the leg and some in the toes, The toes still have a squeeze sensation. Asked if she can get a injection in the foot.

## 2021-01-06 NOTE — TELEPHONE ENCOUNTER
Medication request: Celecoxib 100 mg cap. Take 1 capsule 2 times daily as needed. #180. No refills. LOV: 12/15/2020  NOV: None    [place]:12/08/2020      Patient is asking for 90 day supply send to mail order.

## 2021-01-09 RX ORDER — HYDROCHLOROTHIAZIDE 25 MG/1
TABLET ORAL
Qty: 30 TABLET | Refills: 0 | Status: SHIPPED | OUTPATIENT
Start: 2021-01-09 | End: 2021-01-14

## 2021-01-14 ENCOUNTER — OFFICE VISIT (OUTPATIENT)
Dept: FAMILY MEDICINE CLINIC | Facility: CLINIC | Age: 56
End: 2021-01-14
Payer: COMMERCIAL

## 2021-01-14 VITALS
BODY MASS INDEX: 34.06 KG/M2 | OXYGEN SATURATION: 98 % | SYSTOLIC BLOOD PRESSURE: 120 MMHG | RESPIRATION RATE: 16 BRPM | HEIGHT: 67 IN | DIASTOLIC BLOOD PRESSURE: 80 MMHG | TEMPERATURE: 98 F | WEIGHT: 217 LBS | HEART RATE: 80 BPM

## 2021-01-14 DIAGNOSIS — Z12.31 ENCOUNTER FOR SCREENING MAMMOGRAM FOR MALIGNANT NEOPLASM OF BREAST: ICD-10-CM

## 2021-01-14 DIAGNOSIS — Z00.00 LABORATORY EXAMINATION ORDERED AS PART OF A ROUTINE GENERAL MEDICAL EXAMINATION: ICD-10-CM

## 2021-01-14 DIAGNOSIS — N28.1 KIDNEY CYST, ACQUIRED: ICD-10-CM

## 2021-01-14 DIAGNOSIS — E66.9 OBESITY (BMI 30-39.9): ICD-10-CM

## 2021-01-14 DIAGNOSIS — I10 HYPERTENSION, UNSPECIFIED TYPE: Primary | ICD-10-CM

## 2021-01-14 DIAGNOSIS — R73.01 IMPAIRED FASTING BLOOD SUGAR: ICD-10-CM

## 2021-01-14 PROCEDURE — 3079F DIAST BP 80-89 MM HG: CPT | Performed by: FAMILY MEDICINE

## 2021-01-14 PROCEDURE — 3074F SYST BP LT 130 MM HG: CPT | Performed by: FAMILY MEDICINE

## 2021-01-14 PROCEDURE — 99214 OFFICE O/P EST MOD 30 MIN: CPT | Performed by: FAMILY MEDICINE

## 2021-01-14 PROCEDURE — 3008F BODY MASS INDEX DOCD: CPT | Performed by: FAMILY MEDICINE

## 2021-01-14 RX ORDER — HYDROCHLOROTHIAZIDE 25 MG/1
TABLET ORAL
Qty: 90 TABLET | Refills: 1 | Status: SHIPPED | OUTPATIENT
Start: 2021-01-14

## 2021-01-14 RX ORDER — PHENTERMINE HYDROCHLORIDE 37.5 MG/1
37.5 TABLET ORAL
Qty: 30 TABLET | Refills: 2 | Status: SHIPPED | OUTPATIENT
Start: 2021-01-14 | End: 2021-03-29

## 2021-01-14 NOTE — PROGRESS NOTES
[facility] Family Medicine Office Note  Chief Complaint:   Patient presents with:  Medication Follow-Up  Weight Gain      HPI:   This is a 48year old female coming in for  HPI    Follow up    1.  Cyst on kidney   Incidental   Normal kidney functi 90 tablet 1   • Phentermine HCl 37.5 MG Oral Tab Take 1 tablet (37.5 mg total) by mouth every morning before breakfast. 30 tablet 2   • celecoxib 100 MG Oral Cap Take 1 capsule (100 mg total) by mouth 2 (two) times daily as needed for Pain.  60 capsule 0 AND PLAN:   1. Hypertension, unspecified type  - hydrochlorothiazide 25 MG Oral Tab; TAKE 1 TABLET DAILY  Dispense: 90 tablet; Refill: 1    2. Obesity (BMI 30-39. 9)  Discussed phentermine use - ok to restart   Reviewed risks/benefits and side effects     3

## 2021-01-20 ENCOUNTER — LAB ENCOUNTER (OUTPATIENT)
Dept: LAB | Age: 56
End: 2021-01-20
Attending: FAMILY MEDICINE
Payer: COMMERCIAL

## 2021-01-20 DIAGNOSIS — R73.01 IMPAIRED FASTING BLOOD SUGAR: ICD-10-CM

## 2021-01-20 DIAGNOSIS — Z00.00 LABORATORY EXAMINATION ORDERED AS PART OF A ROUTINE GENERAL MEDICAL EXAMINATION: ICD-10-CM

## 2021-01-20 LAB
ALBUMIN SERPL-MCNC: 3.9 G/DL (ref 3.4–5)
ALBUMIN/GLOB SERPL: 1.2 {RATIO} (ref 1–2)
ALP LIVER SERPL-CCNC: 79 U/L
ALT SERPL-CCNC: 31 U/L
ANION GAP SERPL CALC-SCNC: 3 MMOL/L (ref 0–18)
AST SERPL-CCNC: 23 U/L (ref 15–37)
BASOPHILS # BLD AUTO: 0.03 X10(3) UL (ref 0–0.2)
BASOPHILS NFR BLD AUTO: 0.7 %
BILIRUB SERPL-MCNC: 0.4 MG/DL (ref 0.1–2)
BUN BLD-MCNC: 13 MG/DL (ref 7–18)
BUN/CREAT SERPL: 12.9 (ref 10–20)
CALCIUM BLD-MCNC: 9.2 MG/DL (ref 8.5–10.1)
CHLORIDE SERPL-SCNC: 109 MMOL/L (ref 98–112)
CHOLEST SMN-MCNC: 195 MG/DL (ref ?–200)
CO2 SERPL-SCNC: 28 MMOL/L (ref 21–32)
CREAT BLD-MCNC: 1.01 MG/DL
DEPRECATED RDW RBC AUTO: 43 FL (ref 35.1–46.3)
EOSINOPHIL # BLD AUTO: 0.2 X10(3) UL (ref 0–0.7)
EOSINOPHIL NFR BLD AUTO: 4.4 %
ERYTHROCYTE [DISTWIDTH] IN BLOOD BY AUTOMATED COUNT: 12.5 % (ref 11–15)
EST. AVERAGE GLUCOSE BLD GHB EST-MCNC: 103 MG/DL (ref 68–126)
GLOBULIN PLAS-MCNC: 3.2 G/DL (ref 2.8–4.4)
GLUCOSE BLD-MCNC: 102 MG/DL (ref 70–99)
HBA1C MFR BLD HPLC: 5.2 % (ref ?–5.7)
HCT VFR BLD AUTO: 39.9 %
HDLC SERPL-MCNC: 50 MG/DL (ref 40–59)
HGB BLD-MCNC: 13.1 G/DL
IMM GRANULOCYTES # BLD AUTO: 0.01 X10(3) UL (ref 0–1)
IMM GRANULOCYTES NFR BLD: 0.2 %
LDLC SERPL CALC-MCNC: 126 MG/DL (ref ?–100)
LYMPHOCYTES # BLD AUTO: 1.78 X10(3) UL (ref 1–4)
LYMPHOCYTES NFR BLD AUTO: 39.3 %
M PROTEIN MFR SERPL ELPH: 7.1 G/DL (ref 6.4–8.2)
MCH RBC QN AUTO: 30.9 PG (ref 26–34)
MCHC RBC AUTO-ENTMCNC: 32.8 G/DL (ref 31–37)
MCV RBC AUTO: 94.1 FL
MONOCYTES # BLD AUTO: 0.39 X10(3) UL (ref 0.1–1)
MONOCYTES NFR BLD AUTO: 8.6 %
NEUTROPHILS # BLD AUTO: 2.12 X10 (3) UL (ref 1.5–7.7)
NEUTROPHILS # BLD AUTO: 2.12 X10(3) UL (ref 1.5–7.7)
NEUTROPHILS NFR BLD AUTO: 46.8 %
NONHDLC SERPL-MCNC: 145 MG/DL (ref ?–130)
OSMOLALITY SERPL CALC.SUM OF ELEC: 290 MOSM/KG (ref 275–295)
PATIENT FASTING Y/N/NP: YES
PATIENT FASTING Y/N/NP: YES
PLATELET # BLD AUTO: 240 10(3)UL (ref 150–450)
POTASSIUM SERPL-SCNC: 4.2 MMOL/L (ref 3.5–5.1)
RBC # BLD AUTO: 4.24 X10(6)UL
SODIUM SERPL-SCNC: 140 MMOL/L (ref 136–145)
TRIGL SERPL-MCNC: 96 MG/DL (ref 30–149)
TSI SER-ACNC: 2.43 MIU/ML (ref 0.36–3.74)
VLDLC SERPL CALC-MCNC: 19 MG/DL (ref 0–30)
WBC # BLD AUTO: 4.5 X10(3) UL (ref 4–11)

## 2021-01-20 PROCEDURE — 80050 GENERAL HEALTH PANEL: CPT | Performed by: FAMILY MEDICINE

## 2021-01-20 PROCEDURE — 83036 HEMOGLOBIN GLYCOSYLATED A1C: CPT | Performed by: FAMILY MEDICINE

## 2021-01-20 PROCEDURE — 80061 LIPID PANEL: CPT | Performed by: FAMILY MEDICINE

## 2021-01-20 PROCEDURE — 36415 COLL VENOUS BLD VENIPUNCTURE: CPT | Performed by: FAMILY MEDICINE

## 2021-02-20 DIAGNOSIS — M79.2 NEUROPATHIC PAIN: ICD-10-CM

## 2021-02-22 RX ORDER — CELECOXIB 100 MG/1
100 CAPSULE ORAL 2 TIMES DAILY PRN
Qty: 60 CAPSULE | Refills: 0 | Status: SHIPPED | OUTPATIENT
Start: 2021-02-22 | End: 2021-03-29

## 2021-02-22 NOTE — TELEPHONE ENCOUNTER
Medication request: Celecoxib 100mg Take 1 capsule (100 mg total) by mouth 2 (two) times daily as needed for Pain    LOV: 12/23/20 TFSHEILA  NOV: none    [place]: 01/06/21 #60 r-0

## 2021-03-29 DIAGNOSIS — M79.2 NEUROPATHIC PAIN: ICD-10-CM

## 2021-03-29 RX ORDER — CELECOXIB 100 MG/1
100 CAPSULE ORAL 2 TIMES DAILY PRN
Qty: 60 CAPSULE | Refills: 0 | Status: SHIPPED | OUTPATIENT
Start: 2021-03-29 | End: 2021-05-13

## 2021-03-29 NOTE — TELEPHONE ENCOUNTER
Medication refilled. Please call patient and schedule a follow-up appointment with Dr. Angelina Hyatt.     Sindy Baer DO, FAAPMR & CAQSM  Physical Medicine and Rehabilitation/Sports Medicine  MEDICAL CENTER [facility]

## 2021-03-29 NOTE — TELEPHONE ENCOUNTER
Medication request: celecoxib 100 mg oral cap. Take 1 capsule by mouth 2 times daily as needed for pain. #60.  No refills    LOV:12/15/20  NOV: none    [place]:2/22/21

## 2021-03-30 RX ORDER — PHENTERMINE HYDROCHLORIDE 37.5 MG/1
37.5 TABLET ORAL
Qty: 30 TABLET | Refills: 2 | Status: SHIPPED | OUTPATIENT
Start: 2021-03-30 | End: 2021-05-13

## 2021-04-06 ENCOUNTER — PATIENT MESSAGE (OUTPATIENT)
Dept: FAMILY MEDICINE CLINIC | Facility: CLINIC | Age: 56
End: 2021-04-06

## 2021-04-07 NOTE — TELEPHONE ENCOUNTER
From: Aby Hung  To: Nereyda Fox MD  Sent: 4/6/2021 6:29 PM CDT  Subject: Non-Urgent Medical Question    Hello-    I received a scheduling ticket for the COVID-19 Vaccination.    I received my first dose Moderna 01/25/2021 and my second dose 02/21

## 2021-04-07 NOTE — TELEPHONE ENCOUNTER
Immunization record updated. Canceled Covid vaccine request. Notified the patient via [address]

## 2021-04-15 ENCOUNTER — HOSPITAL ENCOUNTER (OUTPATIENT)
Dept: MAMMOGRAPHY | Age: 56
Discharge: HOME | End: 2021-04-15
Attending: FAMILY MEDICINE
Payer: COMMERCIAL

## 2021-04-15 DIAGNOSIS — Z12.31 ENCOUNTER FOR SCREENING MAMMOGRAM FOR MALIGNANT NEOPLASM OF BREAST: ICD-10-CM

## 2021-04-15 PROCEDURE — 77067 SCR MAMMO BI INCL CAD: CPT | Performed by: FAMILY MEDICINE

## 2021-04-15 PROCEDURE — 77063 BREAST TOMOSYNTHESIS BI: CPT | Performed by: FAMILY MEDICINE

## 2021-05-06 ENCOUNTER — TELEMEDICINE (OUTPATIENT)
Dept: TELEHEALTH | Age: 56
End: 2021-05-06

## 2021-05-06 DIAGNOSIS — Z02.9 ADMINISTRATIVE ENCOUNTER: Primary | ICD-10-CM

## 2021-05-06 PROCEDURE — 99499 UNLISTED E&M SERVICE: CPT | Performed by: NURSE PRACTITIONER

## 2021-05-06 NOTE — PROGRESS NOTES
Well nourished 47 yo female enquiring about possible Covid 19 testing. She has already been diagnosed positive for Covid shortly after pandemic. Completed her Mederna vaccine series Febuary.   However Co-Work was coughing and developed paralysis on (1) si

## 2021-05-13 DIAGNOSIS — M79.2 NEUROPATHIC PAIN: ICD-10-CM

## 2021-05-13 RX ORDER — CELECOXIB 100 MG/1
100 CAPSULE ORAL 2 TIMES DAILY PRN
Qty: 60 CAPSULE | Refills: 0 | Status: SHIPPED | OUTPATIENT
Start: 2021-05-13 | End: 2021-06-27

## 2021-05-13 NOTE — TELEPHONE ENCOUNTER
Medication request: Celecoxib 100mg Take 1 capsule (100 mg total) by mouth 2 (two) times daily as needed for Pain.     LOV: 12/23/20  NOV: 05/20/21    [place] 03/29/21 #60 r-0

## 2021-05-14 RX ORDER — PHENTERMINE HYDROCHLORIDE 37.5 MG/1
37.5 TABLET ORAL
Qty: 30 TABLET | Refills: 0 | Status: SHIPPED | OUTPATIENT
Start: 2021-05-14 | End: 2021-06-27

## 2021-05-20 ENCOUNTER — TELEPHONE (OUTPATIENT)
Dept: NEUROLOGY | Facility: CLINIC | Age: 56
End: 2021-05-20

## 2021-05-20 ENCOUNTER — OFFICE VISIT (OUTPATIENT)
Dept: NEUROLOGY | Facility: CLINIC | Age: 56
End: 2021-05-20
Payer: COMMERCIAL

## 2021-05-20 VITALS
SYSTOLIC BLOOD PRESSURE: 144 MMHG | WEIGHT: 205 LBS | DIASTOLIC BLOOD PRESSURE: 82 MMHG | BODY MASS INDEX: 32.18 KG/M2 | HEIGHT: 67 IN | HEART RATE: 90 BPM | OXYGEN SATURATION: 98 %

## 2021-05-20 DIAGNOSIS — M54.16 LEFT LUMBAR RADICULITIS: Primary | ICD-10-CM

## 2021-05-20 PROCEDURE — 99214 OFFICE O/P EST MOD 30 MIN: CPT | Performed by: PHYSICAL MEDICINE & REHABILITATION

## 2021-05-20 PROCEDURE — 3008F BODY MASS INDEX DOCD: CPT | Performed by: PHYSICAL MEDICINE & REHABILITATION

## 2021-05-20 PROCEDURE — 3079F DIAST BP 80-89 MM HG: CPT | Performed by: PHYSICAL MEDICINE & REHABILITATION

## 2021-05-20 PROCEDURE — 3077F SYST BP >= 140 MM HG: CPT | Performed by: PHYSICAL MEDICINE & REHABILITATION

## 2021-05-20 NOTE — PATIENT INSTRUCTIONS
Stop the celebrex at least 24 hours before injection - so if we do it on a Wednesday morning take the last dose Monday night.

## 2021-05-20 NOTE — TELEPHONE ENCOUNTER
Called  for authorization of approval of Left L5 transforaminal epidural steroid injection under fluoroscopy cpt codes 69802-XY, 69831. Fund office is now closed.  Will call in the am.

## 2021-05-20 NOTE — PROGRESS NOTES
Progress note    C/C: Low back, left leg pain    HPI: 79-year-old female presents for follow-up.   Underwent left L5 transforaminal epidural steroid injection under fluoroscopy, local, on 12/23/2020.  80% relief for about 4 months, afterwards symptoms began

## 2021-05-21 NOTE — TELEPHONE ENCOUNTER
Called [facility] [employer] PPO   for authorization of approval of Left L5 transforaminal epidural steroid injection under fluoroscopy cpt codes 67682-NU, 94874. T/t Damon Gauthier. Authorization is required through [facility]. Reference # [id number].

## 2021-05-25 NOTE — TELEPHONE ENCOUNTER
Called [facility] to check oj status of  Authorization for Left L5 transforaminal epidural steroid injection under fluoroscopy. Reference  [id number]. T/t Nilson Seth. Authorization # [id number] for one unit/DOS effective 06/01/21 to 12/01/21. Will inform Nursing.

## 2021-05-25 NOTE — TELEPHONE ENCOUNTER
Patient has been scheduled for a Left L5 transforaminal epidural steroid injection under fluoroscopy, local on 05/26/21 at the [facility]. Medications and allergies reviewed.  Patient informed we will need verbal or written authorization from patients Primary Car

## 2021-05-26 ENCOUNTER — OFFICE VISIT (OUTPATIENT)
Dept: SURGERY | Facility: CLINIC | Age: 56
End: 2021-05-26

## 2021-05-26 DIAGNOSIS — M54.16 LEFT LUMBAR RADICULITIS: Primary | ICD-10-CM

## 2021-05-26 NOTE — PROCEDURES
[facility].    LUMBAR TRANSFORAMINAL   NAME:  Marty Shine    MR #:    [id number] :  1965     PHYSICIAN:  Louise Burnett        Operative Report    DATE OF PROCEDURE: 2021   PREOPERATIVE DIAGNOSES: 1.  Left lumbar ra follow up in the clinic as scheduled. Throughout the whole procedure, the patient's pulse oximetry and vital signs were monitored and they remained completely stable.   Also, throughout the whole procedure, prior to injection of any medication, aspiration

## 2021-06-12 ENCOUNTER — TELEMEDICINE (OUTPATIENT)
Dept: TELEHEALTH | Age: 56
End: 2021-06-12

## 2021-06-12 DIAGNOSIS — L08.9 LOCAL SKIN INFECTION: Primary | ICD-10-CM

## 2021-06-12 PROCEDURE — 99213 OFFICE O/P EST LOW 20 MIN: CPT | Performed by: NURSE PRACTITIONER

## 2021-06-12 RX ORDER — CEPHALEXIN 500 MG/1
500 CAPSULE ORAL 2 TIMES DAILY
Qty: 20 CAPSULE | Refills: 0 | Status: SHIPPED | OUTPATIENT
Start: 2021-06-12 | End: 2021-08-02 | Stop reason: ALTCHOICE

## 2021-06-12 NOTE — PATIENT INSTRUCTIONS
Infected Insect Bite or Sting      When an insect stings you, it injects venom. When an insect bites you, it does not. Stings and bites may cause a local reaction. Or they may cause a reaction that affects your whole body.  Bites and stings may become inf liver or kidney disease. Also talk with your doctor if you have ever had a stomach ulcer or digestive bleeding.   Follow-up care  Follow up with your healthcare provider, or as advised if you don't get better over the next 2 days or if your symptoms get wor

## 2021-06-12 NOTE — PROGRESS NOTES
This is a telemedicine visit with live, interactive video and audio. Patient understands and accepts financial responsibility for any deductible, co-insurance and/or co-pays associated with this service.     SUBJECTIVE  \"I think I might be getting cell tablet 1       OBJECTIVE  Physical Exam:   alert, appears stated age and cooperative 47 yo female with complaint of \"several hundred\" insect bites to bilateral legs 2 days ago. Denies constitutional complaints ie fever, chills or night sweats.   Patient counter antihistamine unless your healthcare provider has given you a prescription antihistamine. You may use antihistamines to reduce itching if large areas of the skin are involved.  Use lower doses during the daytime and higher doses at bedtime since the

## 2021-06-27 DIAGNOSIS — M79.2 NEUROPATHIC PAIN: ICD-10-CM

## 2021-06-28 NOTE — TELEPHONE ENCOUNTER
Medication request: Celecoxib 100mg Take 1 capsule (100 mg total) by mouth 2 (two) times daily as needed for Pain.     LOV: 05/26/21  NOV: none    [place] 05/13/21 #60 r-0

## 2021-06-29 RX ORDER — PHENTERMINE HYDROCHLORIDE 37.5 MG/1
37.5 TABLET ORAL
Qty: 30 TABLET | Refills: 0 | Status: SHIPPED | OUTPATIENT
Start: 2021-06-29 | End: 2021-08-02

## 2021-06-29 RX ORDER — CELECOXIB 100 MG/1
100 CAPSULE ORAL 2 TIMES DAILY PRN
Qty: 60 CAPSULE | Refills: 0 | Status: SHIPPED | OUTPATIENT
Start: 2021-06-29 | End: 2021-08-10

## 2021-07-01 ENCOUNTER — HOSPITAL ENCOUNTER (OUTPATIENT)
Dept: ULTRASOUND IMAGING | Age: 56
Discharge: HOME | End: 2021-07-01
Attending: FAMILY MEDICINE
Payer: COMMERCIAL

## 2021-07-01 DIAGNOSIS — N28.1 KIDNEY CYST, ACQUIRED: ICD-10-CM

## 2021-07-01 PROCEDURE — 76775 US EXAM ABDO BACK WALL LIM: CPT | Performed by: FAMILY MEDICINE

## 2021-07-29 ENCOUNTER — TELEMEDICINE (OUTPATIENT)
Dept: TELEHEALTH | Age: 56
End: 2021-07-29

## 2021-07-29 DIAGNOSIS — J01.40 ACUTE NON-RECURRENT PANSINUSITIS: ICD-10-CM

## 2021-07-29 DIAGNOSIS — H92.01 ACUTE EAR PAIN, RIGHT: Primary | ICD-10-CM

## 2021-07-29 PROCEDURE — 99213 OFFICE O/P EST LOW 20 MIN: CPT | Performed by: NURSE PRACTITIONER

## 2021-07-29 RX ORDER — AMOXICILLIN AND CLAVULANATE POTASSIUM 875; 125 MG/1; MG/1
1 TABLET, FILM COATED ORAL 2 TIMES DAILY
Qty: 20 TABLET | Refills: 0 | Status: SHIPPED | OUTPATIENT
Start: 2021-07-29 | End: 2021-08-08

## 2021-07-29 NOTE — PATIENT INSTRUCTIONS
Middle Ear Infection (Adult)  You have an infection of the middle ear, the space behind the eardrum. This is also called acute otitis media (AOM). Sometimes it is caused by the common cold.  This is because congestion can block the internal passage (eustach plenty of water can help sinuses drain. Sinusitis can often be managed with self-care. Self-care can keep sinuses moist and make you feel more comfortable. Remember to follow your doctor's instructions closely.  This can make a big difference in getting y

## 2021-07-29 NOTE — PROGRESS NOTES
CHIEF COMPLAINT:   Patient presents with:  Ear Pain  Sinus Problem    HPI:   Encounter was conducted by video. José Gonzalez is a 55year old female with hx of right ear pain and  o frontal and maxillary sinus congestion and pressure.  Reports heada (alchoholism) Father    • Other (Other) Mother    • Other (pulmonary embolism) Mother    • Breast Cancer Sister 54      Social History    Tobacco Use      Smoking status: Former Smoker        Packs/day: 0.00        Quit date: 10/24/1996        Years since guidelines:  · Finish all of the antibiotic medicine given, even though you may feel better after the first few days.   · You may use over-the-counter medicine, such as acetaminophen or ibuprofen, to control pain and fever, unless something else was prescri follow the product maker's instructions on how to use it. Clean it on a regular schedule. Use saltwater rinses  Rinses help keep your sinuses and nose moist. Mix a teaspoon of salt in 8 ounces of fresh, warm water.  Use a bulb syringe to gently squirt the

## 2021-08-02 ENCOUNTER — OFFICE VISIT (OUTPATIENT)
Dept: FAMILY MEDICINE CLINIC | Facility: CLINIC | Age: 56
End: 2021-08-02
Payer: COMMERCIAL

## 2021-08-02 VITALS
RESPIRATION RATE: 16 BRPM | HEIGHT: 67 IN | WEIGHT: 203 LBS | BODY MASS INDEX: 31.86 KG/M2 | OXYGEN SATURATION: 97 % | DIASTOLIC BLOOD PRESSURE: 80 MMHG | HEART RATE: 92 BPM | SYSTOLIC BLOOD PRESSURE: 134 MMHG

## 2021-08-02 DIAGNOSIS — E66.9 OBESITY (BMI 30-39.9): Primary | ICD-10-CM

## 2021-08-02 DIAGNOSIS — L24.9 IRRITANT CONTACT DERMATITIS, UNSPECIFIED TRIGGER: ICD-10-CM

## 2021-08-02 PROCEDURE — 99214 OFFICE O/P EST MOD 30 MIN: CPT | Performed by: NURSE PRACTITIONER

## 2021-08-02 PROCEDURE — 3075F SYST BP GE 130 - 139MM HG: CPT | Performed by: NURSE PRACTITIONER

## 2021-08-02 PROCEDURE — 3079F DIAST BP 80-89 MM HG: CPT | Performed by: NURSE PRACTITIONER

## 2021-08-02 PROCEDURE — 3008F BODY MASS INDEX DOCD: CPT | Performed by: NURSE PRACTITIONER

## 2021-08-02 RX ORDER — PREDNISONE 20 MG/1
TABLET ORAL
Qty: 9 TABLET | Refills: 0 | Status: SHIPPED | OUTPATIENT
Start: 2021-08-02 | End: 2022-01-17 | Stop reason: ALTCHOICE

## 2021-08-02 RX ORDER — PHENTERMINE HYDROCHLORIDE 37.5 MG/1
37.5 TABLET ORAL
Qty: 30 TABLET | Refills: 2 | Status: SHIPPED | OUTPATIENT
Start: 2021-08-02 | End: 2022-01-17 | Stop reason: ALTCHOICE

## 2021-08-02 NOTE — PATIENT INSTRUCTIONS
Understanding Contact Dermatitis     A cool, moist compress can help reduce itching. Contact dermatitis is a common type of skin rash. It’s caused by something that touches the skin and makes it irritated and inflamed.  It can occur on skin on any part 30 minutes, 5 to 6 times a day for the first 3 days. · Steroid cream or ointment. You can apply this medicine several times a day on clean skin. · Oral corticosteroid.  Your healthcare provider may prescribe this medicine if you have severe skin symptoms

## 2021-08-10 DIAGNOSIS — M79.2 NEUROPATHIC PAIN: ICD-10-CM

## 2021-08-10 RX ORDER — CELECOXIB 100 MG/1
100 CAPSULE ORAL 2 TIMES DAILY PRN
Qty: 60 CAPSULE | Refills: 0 | Status: SHIPPED | OUTPATIENT
Start: 2021-08-10 | End: 2021-10-16

## 2021-08-10 NOTE — TELEPHONE ENCOUNTER
Medication request: Celecoxib 100mg Take 1 capsule (100 mg total) by mouth 2 (two) times daily as needed for Pain.     LOV: 05/26/21  NOV: none    [place] 06/29/21 #60 r-0

## 2021-10-16 DIAGNOSIS — M79.2 NEUROPATHIC PAIN: ICD-10-CM

## 2021-10-19 RX ORDER — CELECOXIB 100 MG/1
100 CAPSULE ORAL 2 TIMES DAILY PRN
Qty: 60 CAPSULE | Refills: 0 | Status: SHIPPED | OUTPATIENT
Start: 2021-10-19 | End: 2021-11-24

## 2021-11-23 ENCOUNTER — PATIENT MESSAGE (OUTPATIENT)
Dept: NEUROLOGY | Facility: CLINIC | Age: 56
End: 2021-11-23

## 2021-11-23 DIAGNOSIS — M79.2 NEUROPATHIC PAIN: ICD-10-CM

## 2021-11-24 ENCOUNTER — TELEPHONE (OUTPATIENT)
Dept: PHYSICAL MEDICINE AND REHAB | Facility: CLINIC | Age: 56
End: 2021-11-24

## 2021-11-24 RX ORDER — CELECOXIB 100 MG/1
100 CAPSULE ORAL 2 TIMES DAILY PRN
Qty: 60 CAPSULE | Refills: 1 | Status: SHIPPED | OUTPATIENT
Start: 2021-11-24 | End: 2022-01-11

## 2021-11-24 NOTE — TELEPHONE ENCOUNTER
Left a detailed message for patient to call the office to schedule a follow up appointment. Last seen in office 5/20/21.  Injection on 5/26/21

## 2021-11-24 NOTE — TELEPHONE ENCOUNTER
From: Jose Moe  To: Vinita Roque DO  Sent: 11/23/2021 5:15 PM CST  Subject: Left Leg Issues    Hello-  It's been a few months since I've been in the office, and I'm having some concerning issues my leg.  I've always had pain in my toes, it feels

## 2021-12-29 ENCOUNTER — NURSE ONLY (OUTPATIENT)
Dept: LAB | Age: 56
End: 2021-12-29
Attending: FAMILY MEDICINE
Payer: COMMERCIAL

## 2021-12-29 ENCOUNTER — TELEMEDICINE (OUTPATIENT)
Dept: FAMILY MEDICINE CLINIC | Facility: CLINIC | Age: 56
End: 2021-12-29

## 2021-12-29 DIAGNOSIS — R05.9 COUGH: ICD-10-CM

## 2021-12-29 DIAGNOSIS — R05.9 COUGH: Primary | ICD-10-CM

## 2021-12-29 DIAGNOSIS — R09.81 SINUS CONGESTION: ICD-10-CM

## 2021-12-29 PROCEDURE — 99213 OFFICE O/P EST LOW 20 MIN: CPT | Performed by: FAMILY MEDICINE

## 2021-12-29 RX ORDER — CODEINE PHOSPHATE AND GUAIFENESIN 10; 100 MG/5ML; MG/5ML
10 SOLUTION ORAL NIGHTLY
Qty: 118 ML | Refills: 0 | Status: SHIPPED | OUTPATIENT
Start: 2021-12-29 | End: 2022-01-17 | Stop reason: ALTCHOICE

## 2021-12-29 NOTE — PROGRESS NOTES
Telehealth outside of [address] Verbal Consent   I conducted a telehealth visit with Kyleigh Beth today, 12/29/21, which was completed using two-way, real-time interactive audio and video communication.  This has been done in good anaid to p doses, due for booster.      History/Other:   Chief Complaint Reviewed and Verified  No Further Nursing Notes to Review          Current Outpatient Medications   Medication Sig Dispense Refill   • guaiFENesin-codeine 100-10 MG/5ML Oral Solution Take 10 mL b for this visit:    Cough  -     SARS-COV-2 BY PCR ([facility]); Future    Sinus congestion  -     SARS-COV-2 BY PCR ([facility]); Future    Other orders  -     guaiFENesin-codeine 100-10 MG/5ML Oral Solution; Take 10 mL by mouth at bedtime.     covid infection li

## 2021-12-29 NOTE — PATIENT INSTRUCTIONS
Medications to help symptom relief of Covid-19 at home  For cough/congestion -   Delsym (dextromethorphan)    Immediate release 30mg every 6-8 hours, max dose 120mg/24h   Extended release 60mg every 12 hours, max dose 120mg/24h  Mucinex (guainfenesin) 600-

## 2021-12-31 ENCOUNTER — PATIENT MESSAGE (OUTPATIENT)
Dept: FAMILY MEDICINE CLINIC | Facility: CLINIC | Age: 56
End: 2021-12-31

## 2022-01-01 ENCOUNTER — PATIENT MESSAGE (OUTPATIENT)
Dept: FAMILY MEDICINE CLINIC | Facility: CLINIC | Age: 57
End: 2022-01-01

## 2022-01-01 NOTE — TELEPHONE ENCOUNTER
From: Jose Cruz  To: Delonte Coto MD  Sent: 12/31/2021 4:07 PM CST  Subject: Question regarding SARS-COV-2    Is this a positive?

## 2022-01-01 NOTE — TELEPHONE ENCOUNTER
From: Nenita Hill  To: Jani Vegas MD  Sent: 1/1/2022 7:16 AM CST  Subject: Covid test    Hello-   I’ll need to schedule another test to return to work, when should I do so?   Thank you-

## 2022-01-01 NOTE — TELEPHONE ENCOUNTER
Doc,   pt states she will need to test again before she goes back to work.   Please advise as to when pt should retest

## 2022-01-03 ENCOUNTER — TELEMEDICINE (OUTPATIENT)
Dept: FAMILY MEDICINE CLINIC | Facility: CLINIC | Age: 57
End: 2022-01-03

## 2022-01-03 DIAGNOSIS — U07.1 COVID-19 VIRUS INFECTION: ICD-10-CM

## 2022-01-03 DIAGNOSIS — R09.81 SINUS CONGESTION: Primary | ICD-10-CM

## 2022-01-03 PROCEDURE — 99213 OFFICE O/P EST LOW 20 MIN: CPT | Performed by: FAMILY MEDICINE

## 2022-01-03 RX ORDER — DOXYCYCLINE HYCLATE 100 MG/1
100 CAPSULE ORAL 2 TIMES DAILY
Qty: 20 CAPSULE | Refills: 0 | Status: SHIPPED | OUTPATIENT
Start: 2022-01-03 | End: 2022-01-13

## 2022-01-03 NOTE — PROGRESS NOTES
Telehealth outside of [address] Verbal Consent   I conducted a telehealth visit with Cecy Frances today, 01/03/22, which was completed using two-way, real-time interactive audio and video communication.  This has been done in good anaid to shwetha 1/7/21    History/Other:   Chief Complaint Reviewed and Verified  Nursing Notes Reviewed and   Verified  Tobacco Reviewed  Allergies Reviewed  Medications Reviewed    Problem List Reviewed  Medical History Reviewed  Surgical History   Reviewed  Family Hist and all orders for this visit:    Sinus congestion   Cont mucinex and nasal saline    If not improving by 1/5/22 - then start oral abx     COVID-19 virus infection   Resolving    Reviewed isolation guidelines per CDC     Other orders  -     doxycycline 100

## 2022-01-05 NOTE — TELEPHONE ENCOUNTER
Letter written at [facility] [address] on 1/3/22    Repeat testing not recommended

## 2022-01-11 ENCOUNTER — TELEMEDICINE (OUTPATIENT)
Dept: PHYSICAL MEDICINE AND REHAB | Facility: CLINIC | Age: 57
End: 2022-01-11

## 2022-01-11 DIAGNOSIS — G62.9 NEUROPATHY: Primary | ICD-10-CM

## 2022-01-11 DIAGNOSIS — M79.2 NEUROPATHIC PAIN: ICD-10-CM

## 2022-01-11 PROCEDURE — 99214 OFFICE O/P EST MOD 30 MIN: CPT | Performed by: PHYSICAL MEDICINE & REHABILITATION

## 2022-01-11 RX ORDER — CELECOXIB 100 MG/1
100 CAPSULE ORAL 2 TIMES DAILY PRN
Qty: 180 CAPSULE | Refills: 0 | Status: SHIPPED | OUTPATIENT
Start: 2022-01-11

## 2022-01-11 RX ORDER — CELECOXIB 100 MG/1
100 CAPSULE ORAL 2 TIMES DAILY
Qty: 60 CAPSULE | Refills: 0 | Status: SHIPPED | OUTPATIENT
Start: 2022-01-11

## 2022-01-11 NOTE — PROGRESS NOTES
This visit is conducted using Telemedicine with live, interactive video and audio. Patient has been referred to the [facility] website at [email or web address] to review the yearly Consent to Treat document.     Patient understands and accepts financial res

## 2022-01-12 ENCOUNTER — TELEPHONE (OUTPATIENT)
Dept: PHYSICAL MEDICINE AND REHAB | Facility: CLINIC | Age: 57
End: 2022-01-12

## 2022-01-12 NOTE — TELEPHONE ENCOUNTER
Melvin Husbands, DO Zoie Cavazos, Dudley Bryson, RN  Please contact patient, let her know I discussed her case with one of the pain management doctors at [facility], Dr. Nicci Cleaning.  He has a few treatment options for her; please let her know to schedule an appointment

## 2022-01-13 ENCOUNTER — TELEPHONE (OUTPATIENT)
Dept: PHYSICAL MEDICINE AND REHAB | Facility: CLINIC | Age: 57
End: 2022-01-13

## 2022-01-13 DIAGNOSIS — M79.2 NEUROPATHIC PAIN: Primary | ICD-10-CM

## 2022-01-13 NOTE — TELEPHONE ENCOUNTER
This patient is interested in neuropathic cream. Please contact [facility] and ask them if they will mail her compounded neuropathic cream if I order it for her. Not sure if they mail to patients in [place].  The contact in

## 2022-01-13 NOTE — TELEPHONE ENCOUNTER
Free shipping, 2 days for shipping. Able to send in [place].      -Include patient's insurance just to double check pricing.     Please send order

## 2022-01-13 NOTE — TELEPHONE ENCOUNTER
referral order faxed to Shira at [phone]. LMTCB to let her know of $120 out of pocket cost for medication.

## 2022-01-17 ENCOUNTER — OFFICE VISIT (OUTPATIENT)
Dept: PAIN CLINIC | Facility: CLINIC | Age: 57
End: 2022-01-17
Payer: COMMERCIAL

## 2022-01-17 VITALS
DIASTOLIC BLOOD PRESSURE: 92 MMHG | BODY MASS INDEX: 32 KG/M2 | SYSTOLIC BLOOD PRESSURE: 160 MMHG | WEIGHT: 203 LBS | HEART RATE: 88 BPM | OXYGEN SATURATION: 98 %

## 2022-01-17 DIAGNOSIS — G62.9 NEUROPATHY: Primary | ICD-10-CM

## 2022-01-17 PROBLEM — G90.522 COMPLEX REGIONAL PAIN SYNDROME TYPE 1 OF LEFT LOWER EXTREMITY: Status: ACTIVE | Noted: 2022-01-17

## 2022-01-17 PROCEDURE — 3080F DIAST BP >= 90 MM HG: CPT | Performed by: ANESTHESIOLOGY

## 2022-01-17 PROCEDURE — 3077F SYST BP >= 140 MM HG: CPT | Performed by: ANESTHESIOLOGY

## 2022-01-17 PROCEDURE — 99204 OFFICE O/P NEW MOD 45 MIN: CPT | Performed by: ANESTHESIOLOGY

## 2022-01-17 NOTE — PROGRESS NOTES
Subjective:   Patient ID: Naveen Mccabe is a 55year old female.     HPI    History/Other:   Review of Systems  Current Outpatient Medications   Medication Sig Dispense Refill   • celecoxib 100 MG Oral Cap Take 1 capsule (100 mg total) by mouth 2 (two)

## 2022-01-17 NOTE — H&P
Name: Balwinder Schrader   : 1965   DOS: 2022     Chief complaint: Left foot pain    History of present illness:  Balwinder Schrader is a 55year old female who presents today for evaluation of pain and numbness in the left foot.   This occurre hyperplastic   • ENDOMETRIAL ABLATION  2015   • REPAIR ACHILLES TENDON,PRIMARY        Family History   Problem Relation Age of Onset   • Other (Other) Father    • Other (alchoholism) Father    • Other (Other) Mother    • Other (pulmonary embolism) Mother posterior Achilles tendon repair scar. No mottling appreciated. 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   MRI with further reviewed with evidence of muscle atrophy in the foot    As

## 2022-01-24 ENCOUNTER — TELEPHONE (OUTPATIENT)
Dept: NEUROLOGY | Facility: CLINIC | Age: 57
End: 2022-01-24

## 2022-01-24 DIAGNOSIS — G90.522 COMPLEX REGIONAL PAIN SYNDROME TYPE 1 OF LEFT LOWER EXTREMITY: Primary | ICD-10-CM

## 2022-01-24 NOTE — TELEPHONE ENCOUNTER
Prior authorization request completed for: L lumbar sympathetic nerve block    Authorization # [id number]  Authorization dates: 1/18/2022 - 7/18/2022   CPT codes approved: 29325  Number of visits/dates of service approved: 1  Physician:Dr. Marianne Stapleton   Location: [facility] to schedule

## 2022-01-25 RX ORDER — DIAZEPAM 10 MG/1
10 TABLET ORAL
Qty: 1 TABLET | Refills: 0 | Status: SHIPPED | OUTPATIENT
Start: 2022-01-25 | End: 2022-01-25

## 2022-01-25 NOTE — TELEPHONE ENCOUNTER
Question Answer   Anesthesia Type Sedation   Provider [facility]   Medical clearance requested (will send to Pain Navigator) No   Patient has Medicare coverage?  No   Comments (Please list entire procedure name here.) left lumbar sympathetic nerve block

## 2022-01-25 NOTE — TELEPHONE ENCOUNTER
Patient asking for Valium for this upcoming procedure.      If approved, please send to her preferred pharmacy of:    [facility] [address], [phone], [phone]

## 2022-01-27 ENCOUNTER — HOSPITAL ENCOUNTER (OUTPATIENT)
Facility: HOSPITAL | Age: 57
Setting detail: HOSPITAL OUTPATIENT SURGERY
Discharge: HOME | End: 2022-01-27
Attending: ANESTHESIOLOGY | Admitting: ANESTHESIOLOGY
Payer: COMMERCIAL

## 2022-01-27 ENCOUNTER — APPOINTMENT (OUTPATIENT)
Dept: GENERAL RADIOLOGY | Facility: HOSPITAL | Age: 57
End: 2022-01-27
Attending: ANESTHESIOLOGY
Payer: COMMERCIAL

## 2022-01-27 VITALS
DIASTOLIC BLOOD PRESSURE: 97 MMHG | HEART RATE: 77 BPM | OXYGEN SATURATION: 100 % | RESPIRATION RATE: 16 BRPM | SYSTOLIC BLOOD PRESSURE: 147 MMHG | TEMPERATURE: 98 F

## 2022-01-27 DIAGNOSIS — G90.522 COMPLEX REGIONAL PAIN SYNDROME TYPE 1 OF LEFT LOWER EXTREMITY: ICD-10-CM

## 2022-01-27 PROCEDURE — 3E0T33Z INTRODUCTION OF ANTI-INFLAMMATORY INTO PERIPHERAL NERVES AND PLEXI, PERCUTANEOUS APPROACH: ICD-10-PCS | Performed by: ANESTHESIOLOGY

## 2022-01-27 PROCEDURE — 77003 FLUOROGUIDE FOR SPINE INJECT: CPT | Performed by: ANESTHESIOLOGY

## 2022-01-27 PROCEDURE — 3E0T3GC INTRODUCTION OF OTHER THERAPEUTIC SUBSTANCE INTO PERIPHERAL NERVES AND PLEXI, PERCUTANEOUS APPROACH: ICD-10-PCS | Performed by: ANESTHESIOLOGY

## 2022-01-27 PROCEDURE — 3E0T3BZ INTRODUCTION OF ANESTHETIC AGENT INTO PERIPHERAL NERVES AND PLEXI, PERCUTANEOUS APPROACH: ICD-10-PCS | Performed by: ANESTHESIOLOGY

## 2022-01-27 RX ORDER — DEXAMETHASONE SODIUM PHOSPHATE 10 MG/ML
INJECTION, SOLUTION INTRAMUSCULAR; INTRAVENOUS
Status: DISCONTINUED | OUTPATIENT
Start: 2022-01-27 | End: 2022-01-27

## 2022-01-27 RX ORDER — LIDOCAINE HYDROCHLORIDE 10 MG/ML
INJECTION, SOLUTION EPIDURAL; INFILTRATION; INTRACAUDAL; PERINEURAL
Status: DISCONTINUED | OUTPATIENT
Start: 2022-01-27 | End: 2022-01-27

## 2022-01-27 RX ORDER — ONDANSETRON 2 MG/ML
4 INJECTION INTRAMUSCULAR; INTRAVENOUS ONCE AS NEEDED
Status: DISCONTINUED | OUTPATIENT
Start: 2022-01-27 | End: 2022-01-27

## 2022-01-27 RX ORDER — DIPHENHYDRAMINE HYDROCHLORIDE 50 MG/ML
50 INJECTION INTRAMUSCULAR; INTRAVENOUS ONCE AS NEEDED
Status: DISCONTINUED | OUTPATIENT
Start: 2022-01-27 | End: 2022-01-27

## 2022-01-27 RX ORDER — SODIUM CHLORIDE, SODIUM LACTATE, POTASSIUM CHLORIDE, CALCIUM CHLORIDE 600; 310; 30; 20 MG/100ML; MG/100ML; MG/100ML; MG/100ML
100 INJECTION, SOLUTION INTRAVENOUS CONTINUOUS
Status: DISCONTINUED | OUTPATIENT
Start: 2022-01-27 | End: 2022-01-27

## 2022-01-27 NOTE — H&P
History & Physical Examination    Patient Name: Jessie Guillen  MRN: [id number]  CSN: [id number]  YOB: 1965    Pre-Operative Diagnosis:  Complex regional pain syndrome type 1 of left lower extremity [G90.522]    Present Illness: With persi SYSTEM Check if Review is Normal Check if Physical Exam is Normal If not normal, please explain:   HEENT [x ] [x ]    NECK & BACK [x ] [x ]    HEART [x ] [x ]    LUNGS [x ] [x ]    ABDOMEN [x ] [x ]    UROGENITAL [x ] [x ]    EXTREMITIES [x ] [x ]

## 2022-01-27 NOTE — OPERATIVE REPORT
[facility]  Operative Report  2022     Jillian Dumont Patient Status:  Hospital Outpatient Surgery    1965 MRN [id number]   Location [id number] [place] [address] Attending Tomeka Bradford MD   Hosp Day # 0 PCP Mo Zuñiga MD 240 was injected. This outlined the sympathetic chain. After repeat negative aspiration a total mixture of 10 mg of Decadron with 9 cc of 1% lidocaine was injected. The needle was then withdrawn tip intact. The patient tolerated procedure very well.

## 2022-02-02 ENCOUNTER — PATIENT MESSAGE (OUTPATIENT)
Dept: PHYSICAL MEDICINE AND REHAB | Facility: CLINIC | Age: 57
End: 2022-02-02

## 2022-02-02 NOTE — TELEPHONE ENCOUNTER
From: Reji Martines  Sent: 2/2/2022 11:19 AM CST  To: Gladys Person  Subject: Acosta Salmon have attached the documents. Could you please have the doctor fill out and fax to the number included in the document, I need certification that I had a surgical procedure done on 1/27 with Dr. KAMILAH SIFUENTES, [facility] paperwork to cover follow up visits and PT.  Let me know if you need anything else from me  Thank you

## 2022-02-02 NOTE — TELEPHONE ENCOUNTER
From: Domo Santana  To: Joel King DO  Sent: 2/2/2022 9:20 AM CST  Subject: FMLA, IFMLA    good morning-  I had to call off work for the procedure on my leg Jan 27. I need to have [facility] paperwork filled out to cover my absence form Jan 27 Jan 31, and then [facility] for physical therapy, follow up visits etc.  Nico Fontenot I email these to? or I can dro0p off at the [place] office?   Thank you

## 2022-02-08 ENCOUNTER — TELEPHONE (OUTPATIENT)
Dept: NEUROLOGY | Facility: CLINIC | Age: 57
End: 2022-02-08

## 2022-02-08 ENCOUNTER — PATIENT MESSAGE (OUTPATIENT)
Dept: PHYSICAL MEDICINE AND REHAB | Facility: CLINIC | Age: 57
End: 2022-02-08

## 2022-02-08 NOTE — TELEPHONE ENCOUNTER
ACUPUNCTURE  CPT CODE: 62241,86770-SO precertification is required     [facility] office, spoke to Joseph Simms who states acupuncture is a cover benefit as long as its not related to smoking nor weight loss . Pre-certification is not required and coverage is based on medical necessary . Reference call # MaryT 02/08/21-10:30a.kiarra CALERO for patient to inform of above.

## 2022-02-08 NOTE — TELEPHONE ENCOUNTER
Please advise,   Dr. Gutierrez Ventura had me call patient to ask Where she was doing PT? what for? And for how long? Per patient-     Frankie Smith had discussed another round of PT in the [place] location, &/or acupuncture as it had helped in the past. I have a follow-up appointment with Dr Michelle Mistry next week. I still have foot burning sensations, muscle pulls and tightness in my calf after the procedure I just had on Jan 27. I don't know what else to do to relieve this pain and discomfort in my toes  & it's definitely getting worse. The pads of my feet feel like they're burning and shrinking, and it's making my toes physically curl like a claw. I need to have IFMLA on file to cover my absence at work on Jan 27, and for future call offs due to issues with my foot, and PT sessions once prescribed. There is a deadline of Feb 17 for me to have this paperwork in to [facility]. I am flying tomorrow Feb 8 and will be unable to have phone service until after 7pm CST. Please leave any messages thru MyChart, or I can call the office on Wednesday. Thank you for your time. Karen Moreira

## 2022-02-08 NOTE — TELEPHONE ENCOUNTER
Recommend acupuncture rather than physical therapy. I will place the order. FMLA paperwork printed, I'll deal with that later.

## 2022-02-10 ENCOUNTER — MED REC SCAN ONLY (OUTPATIENT)
Dept: PHYSICAL MEDICINE AND REHAB | Facility: CLINIC | Age: 57
End: 2022-02-10

## 2022-02-10 ENCOUNTER — PATIENT MESSAGE (OUTPATIENT)
Dept: PHYSICAL MEDICINE AND REHAB | Facility: CLINIC | Age: 57
End: 2022-02-10

## 2022-02-11 ENCOUNTER — OFFICE VISIT (OUTPATIENT)
Dept: PAIN CLINIC | Facility: CLINIC | Age: 57
End: 2022-02-11
Payer: COMMERCIAL

## 2022-02-11 VITALS — OXYGEN SATURATION: 97 % | DIASTOLIC BLOOD PRESSURE: 80 MMHG | HEART RATE: 75 BPM | SYSTOLIC BLOOD PRESSURE: 118 MMHG

## 2022-02-11 DIAGNOSIS — G90.522 COMPLEX REGIONAL PAIN SYNDROME TYPE 1 OF LEFT LOWER EXTREMITY: Primary | ICD-10-CM

## 2022-02-11 PROCEDURE — 3079F DIAST BP 80-89 MM HG: CPT | Performed by: ANESTHESIOLOGY

## 2022-02-11 PROCEDURE — 99214 OFFICE O/P EST MOD 30 MIN: CPT | Performed by: ANESTHESIOLOGY

## 2022-02-11 PROCEDURE — 3074F SYST BP LT 130 MM HG: CPT | Performed by: ANESTHESIOLOGY

## 2022-02-11 NOTE — PROGRESS NOTES
Last procedure: LEFT LUMBAR SYMPATHETIC BLOCK with local & VALIUM  Date: 1/27/22  Percentage of relief obtained: 20%  Duration of relief: current    Current Pain Score: 7/10

## 2022-02-11 NOTE — TELEPHONE ENCOUNTER
From: Lorena NOLAND  To: Hayes Ahuja  Sent: 2/8/2022 10:29 AM CST  Subject: FMLA forms    Good Morning BODØ, Dr. Ruth Kehr recommends that you have acupuncture rather than PT, he has signed the order and will have FMLA forms completed as soon as he gets the chance.

## 2022-02-15 ENCOUNTER — TELEPHONE (OUTPATIENT)
Dept: NEUROLOGY | Facility: CLINIC | Age: 57
End: 2022-02-15

## 2022-02-15 NOTE — TELEPHONE ENCOUNTER
Prior authorization request completed for: left lumbar sympathetic nerve block  Authorization # [id number]  Authorization dates: 1/18/2022 - 7/18/2022  CPT codes approved: 58173  Number of visits/dates of service approved: 1  Physician: Dr. Eric Velazquez   Location: [facility] to schedule

## 2022-02-16 RX ORDER — DIAZEPAM 10 MG/1
10 TABLET ORAL
Qty: 1 TABLET | Refills: 0 | Status: SHIPPED | OUTPATIENT
Start: 2022-02-16 | End: 2022-02-16

## 2022-02-16 NOTE — TELEPHONE ENCOUNTER
Please send Valium to her preferred pharmacy:    [facility], [address] [place] [address], [phone], [phone]

## 2022-02-16 NOTE — TELEPHONE ENCOUNTER
Question Answer   Anesthesia Type Local   Provider [facility]   Medical clearance requested (will send to Pain Navigator) No   Patient has Medicare coverage?  No   Comments (Please list entire procedure name here.) left lumbar sympathetic nerve block

## 2022-02-22 ENCOUNTER — PATIENT MESSAGE (OUTPATIENT)
Dept: PHYSICAL MEDICINE AND REHAB | Facility: CLINIC | Age: 57
End: 2022-02-22

## 2022-02-22 NOTE — TELEPHONE ENCOUNTER
LMTCB regarding FMLA forms faxed and sent to scanning. Pt requesting forms be updated to include most recent procedure with Dr. Michelle Mistry.

## 2022-02-25 ENCOUNTER — OFFICE VISIT (OUTPATIENT)
Dept: INTEGRATIVE MEDICINE | Facility: CLINIC | Age: 57
End: 2022-02-25
Payer: COMMERCIAL

## 2022-02-25 DIAGNOSIS — M54.42 CHRONIC LEFT-SIDED LOW BACK PAIN WITH LEFT-SIDED SCIATICA: Primary | ICD-10-CM

## 2022-02-25 DIAGNOSIS — G89.29 CHRONIC LEFT-SIDED LOW BACK PAIN WITH LEFT-SIDED SCIATICA: Primary | ICD-10-CM

## 2022-02-25 PROCEDURE — 97810 ACUP 1/> WO ESTIM 1ST 15 MIN: CPT | Performed by: ACUPUNCTURIST

## 2022-02-25 PROCEDURE — 97811 ACUP 1/> W/O ESTIM EA ADD 15: CPT | Performed by: ACUPUNCTURIST

## 2022-02-25 NOTE — PATIENT INSTRUCTIONS
Reduce coffee and caffeine intake to 1 cup per day. Since you are drinking 1 pot of coffee per day, do your best to reduce as much as you can.

## 2022-02-25 NOTE — PROGRESS NOTES
Nidia Hooker is a 55year old female Acupuncture Therapy. Complaints:  1. LT LBP with radiculopathy  2. LT toes numb and painful    Response/HPI: Have not seen Kirstin Young for over 1 year. Since that time she has had several steroid injections which will decrease pain by 20% but then it returns. She is very frustrated. Stated that the best she felt was when she was getting consistent acupuncture. Has been diagnosed with chronic regional pain syndrome. Also has herniated lumbar discs. She has pain in the low back which radiates to the left side and down the lateral and posterior portion of the leg. Her toes will move involuntarily and range from painful to numb. This pain started after a a surgery to repair her achilles heal which then became infected and required an additional surgery. Drinks at least 1 pot of coffee per day. Sleep varies due to job as a . Stress is very high. Objective:/94. Pulse is thin and deep. TCM Diagnosis: Channel obstruction along DU, GB , UB      Plan:   Acupuncture Therapy: Bilateral barrera men  Second set: RT: SI 11, 10, LI 14, SJ 14, LKDBZB, Howard zhang, LT: GB 34, 41, UB 65  Third set: RT: LG 1, PC 1    Face time 30 minutes  Total time 55 minutes      Treatment performed by Israel Andrew at [facility]. No follow-ups on file.     Mervat Sanchez, [facility]  2/25/2022  2:12 PM

## 2022-03-04 ENCOUNTER — TELEPHONE (OUTPATIENT)
Dept: PAIN CLINIC | Facility: CLINIC | Age: 57
End: 2022-03-04

## 2022-03-04 NOTE — TELEPHONE ENCOUNTER
pt states her [place] paperwork is incomplete; pt states paperwork does not have a start or end date; pt needs start date to be the date of first procedure in January; pls call pt to discuss end date

## 2022-03-04 NOTE — TELEPHONE ENCOUNTER
FMLA ppwk was completed by Dr Jorge Hernandez. LM advising pt that she will need to contact Dr Dain Thacker office for any changes.

## 2022-03-07 ENCOUNTER — OFFICE VISIT (OUTPATIENT)
Dept: INTEGRATIVE MEDICINE | Facility: CLINIC | Age: 57
End: 2022-03-07
Payer: COMMERCIAL

## 2022-03-07 DIAGNOSIS — G89.29 CHRONIC LEFT-SIDED LOW BACK PAIN WITH LEFT-SIDED SCIATICA: Primary | ICD-10-CM

## 2022-03-07 DIAGNOSIS — M54.42 CHRONIC LEFT-SIDED LOW BACK PAIN WITH LEFT-SIDED SCIATICA: Primary | ICD-10-CM

## 2022-03-07 PROCEDURE — 97811 ACUP 1/> W/O ESTIM EA ADD 15: CPT | Performed by: ACUPUNCTURIST

## 2022-03-07 PROCEDURE — 97810 ACUP 1/> WO ESTIM 1ST 15 MIN: CPT | Performed by: ACUPUNCTURIST

## 2022-03-08 ENCOUNTER — APPOINTMENT (OUTPATIENT)
Dept: GENERAL RADIOLOGY | Facility: HOSPITAL | Age: 57
End: 2022-03-08
Attending: ANESTHESIOLOGY
Payer: COMMERCIAL

## 2022-03-08 ENCOUNTER — HOSPITAL ENCOUNTER (OUTPATIENT)
Facility: HOSPITAL | Age: 57
Setting detail: HOSPITAL OUTPATIENT SURGERY
Discharge: HOME | End: 2022-03-08
Attending: ANESTHESIOLOGY | Admitting: ANESTHESIOLOGY
Payer: COMMERCIAL

## 2022-03-08 VITALS
HEIGHT: 67 IN | RESPIRATION RATE: 18 BRPM | TEMPERATURE: 98 F | WEIGHT: 203 LBS | OXYGEN SATURATION: 100 % | BODY MASS INDEX: 31.86 KG/M2 | DIASTOLIC BLOOD PRESSURE: 78 MMHG | SYSTOLIC BLOOD PRESSURE: 130 MMHG | HEART RATE: 62 BPM

## 2022-03-08 DIAGNOSIS — G90.522 COMPLEX REGIONAL PAIN SYNDROME TYPE 1 OF LEFT LOWER EXTREMITY: ICD-10-CM

## 2022-03-08 PROCEDURE — 3E0T3BZ INTRODUCTION OF ANESTHETIC AGENT INTO PERIPHERAL NERVES AND PLEXI, PERCUTANEOUS APPROACH: ICD-10-PCS | Performed by: ANESTHESIOLOGY

## 2022-03-08 PROCEDURE — 77003 FLUOROGUIDE FOR SPINE INJECT: CPT | Performed by: ANESTHESIOLOGY

## 2022-03-08 PROCEDURE — 3E0T33Z INTRODUCTION OF ANTI-INFLAMMATORY INTO PERIPHERAL NERVES AND PLEXI, PERCUTANEOUS APPROACH: ICD-10-PCS | Performed by: ANESTHESIOLOGY

## 2022-03-08 RX ORDER — ONDANSETRON 2 MG/ML
4 INJECTION INTRAMUSCULAR; INTRAVENOUS ONCE AS NEEDED
Status: DISCONTINUED | OUTPATIENT
Start: 2022-03-08 | End: 2022-03-08

## 2022-03-08 RX ORDER — LIDOCAINE HYDROCHLORIDE 10 MG/ML
INJECTION, SOLUTION EPIDURAL; INFILTRATION; INTRACAUDAL; PERINEURAL
Status: DISCONTINUED | OUTPATIENT
Start: 2022-03-08 | End: 2022-03-08

## 2022-03-08 RX ORDER — DEXAMETHASONE SODIUM PHOSPHATE 10 MG/ML
INJECTION, SOLUTION INTRAMUSCULAR; INTRAVENOUS
Status: DISCONTINUED | OUTPATIENT
Start: 2022-03-08 | End: 2022-03-08

## 2022-03-08 RX ORDER — DIPHENHYDRAMINE HYDROCHLORIDE 50 MG/ML
50 INJECTION INTRAMUSCULAR; INTRAVENOUS ONCE AS NEEDED
Status: DISCONTINUED | OUTPATIENT
Start: 2022-03-08 | End: 2022-03-08

## 2022-03-10 ENCOUNTER — TELEPHONE (OUTPATIENT)
Dept: PHYSICAL MEDICINE AND REHAB | Facility: CLINIC | Age: 57
End: 2022-03-10

## 2022-03-10 ENCOUNTER — MED REC SCAN ONLY (OUTPATIENT)
Dept: PHYSICAL MEDICINE AND REHAB | Facility: CLINIC | Age: 57
End: 2022-03-10

## 2022-03-11 ENCOUNTER — OFFICE VISIT (OUTPATIENT)
Dept: INTEGRATIVE MEDICINE | Facility: CLINIC | Age: 57
End: 2022-03-11
Payer: COMMERCIAL

## 2022-03-11 DIAGNOSIS — G89.29 CHRONIC LEFT-SIDED LOW BACK PAIN WITH LEFT-SIDED SCIATICA: Primary | ICD-10-CM

## 2022-03-11 DIAGNOSIS — M54.42 CHRONIC LEFT-SIDED LOW BACK PAIN WITH LEFT-SIDED SCIATICA: Primary | ICD-10-CM

## 2022-03-11 PROCEDURE — 97811 ACUP 1/> W/O ESTIM EA ADD 15: CPT | Performed by: ACUPUNCTURIST

## 2022-03-11 PROCEDURE — 97810 ACUP 1/> WO ESTIM 1ST 15 MIN: CPT | Performed by: ACUPUNCTURIST

## 2022-03-18 ENCOUNTER — OFFICE VISIT (OUTPATIENT)
Dept: INTEGRATIVE MEDICINE | Facility: CLINIC | Age: 57
End: 2022-03-18
Payer: COMMERCIAL

## 2022-03-18 DIAGNOSIS — M54.42 CHRONIC LEFT-SIDED LOW BACK PAIN WITH LEFT-SIDED SCIATICA: Primary | ICD-10-CM

## 2022-03-18 DIAGNOSIS — G89.29 CHRONIC LEFT-SIDED LOW BACK PAIN WITH LEFT-SIDED SCIATICA: Primary | ICD-10-CM

## 2022-03-18 PROCEDURE — 97810 ACUP 1/> WO ESTIM 1ST 15 MIN: CPT | Performed by: ACUPUNCTURIST

## 2022-03-18 PROCEDURE — 97811 ACUP 1/> W/O ESTIM EA ADD 15: CPT | Performed by: ACUPUNCTURIST

## 2022-03-25 ENCOUNTER — OFFICE VISIT (OUTPATIENT)
Dept: INTEGRATIVE MEDICINE | Facility: CLINIC | Age: 57
End: 2022-03-25
Payer: COMMERCIAL

## 2022-03-25 ENCOUNTER — OFFICE VISIT (OUTPATIENT)
Dept: PAIN CLINIC | Facility: CLINIC | Age: 57
End: 2022-03-25
Payer: COMMERCIAL

## 2022-03-25 VITALS
DIASTOLIC BLOOD PRESSURE: 78 MMHG | WEIGHT: 203 LBS | HEART RATE: 78 BPM | BODY MASS INDEX: 32 KG/M2 | SYSTOLIC BLOOD PRESSURE: 122 MMHG | OXYGEN SATURATION: 99 %

## 2022-03-25 DIAGNOSIS — S86.012S RUPTURE OF LEFT ACHILLES TENDON, SEQUELA: Primary | ICD-10-CM

## 2022-03-25 DIAGNOSIS — S91.002S OPEN WOUND OF LEFT ANKLE, SEQUELA: ICD-10-CM

## 2022-03-25 DIAGNOSIS — G90.522 COMPLEX REGIONAL PAIN SYNDROME TYPE 1 OF LEFT LOWER EXTREMITY: ICD-10-CM

## 2022-03-25 DIAGNOSIS — M79.672 LEFT FOOT PAIN: Primary | ICD-10-CM

## 2022-03-25 PROCEDURE — 3074F SYST BP LT 130 MM HG: CPT | Performed by: ANESTHESIOLOGY

## 2022-03-25 PROCEDURE — 97810 ACUP 1/> WO ESTIM 1ST 15 MIN: CPT | Performed by: ACUPUNCTURIST

## 2022-03-25 PROCEDURE — 97811 ACUP 1/> W/O ESTIM EA ADD 15: CPT | Performed by: ACUPUNCTURIST

## 2022-03-25 PROCEDURE — 99214 OFFICE O/P EST MOD 30 MIN: CPT | Performed by: ANESTHESIOLOGY

## 2022-03-25 PROCEDURE — 3078F DIAST BP <80 MM HG: CPT | Performed by: ANESTHESIOLOGY

## 2022-03-25 NOTE — PROGRESS NOTES
Last procedure: LEFT LUMBAR SYMPATHETIC BLOCK WITH LOCAL AND VALIUM  Date: 03/08/22  Percentage of relief obtained: 80% leg, 20% foot  Duration of relief: current    Current Pain Score: 7    Patient states she feels like the relief didn't reach far enough.

## 2022-04-08 NOTE — TELEPHONE ENCOUNTER
Refill Request    Medication request: celecoxib 100 MG Oral Cap    LOV: 1/11/22 Telemedi  Due back to clinic per last office note:  N/A  NOV: Visit date not found      [place]: 1/11/22 #180    Urine drug screen (if applicable): None  Pain contract: None    LOV plan (if weaning or changing medications): N/A

## 2022-04-11 RX ORDER — CELECOXIB 100 MG/1
CAPSULE ORAL
Qty: 180 CAPSULE | Refills: 3 | Status: SHIPPED | OUTPATIENT
Start: 2022-04-11

## 2022-04-13 ENCOUNTER — OFFICE VISIT (OUTPATIENT)
Dept: INTEGRATIVE MEDICINE | Facility: CLINIC | Age: 57
End: 2022-04-13
Payer: COMMERCIAL

## 2022-04-13 DIAGNOSIS — M79.672 LEFT FOOT PAIN: Primary | ICD-10-CM

## 2022-04-13 NOTE — PROGRESS NOTES
Chief Complaint(s):  Left foot pain  Subjective  Lia Judd has continued to see improvement but her feet are what remain for the most pain and tingling. We talked about the foots that she is eating and if it is contributing to her inflammation. She did notice that she had more foot pain after drinking wine. I sent her home with the different food sensitivity companies as options that are offered here. Objective   Slight swelling on her left lateral ankle. Assessment  Liver myah stasis    Plan  CANELO Leone, MANJULA performed acupuncture without electrical stimulation for 20 minutes at the following points:Fa Beng, Three horses    Set two of needles were performed for 20 minutes:SP10, SP6, LU7, GB21, LI15    The total Facetime was 23 minutes. Total treatment time was 63 minutes.     Next appointment:April 18, 2022    Treatment Plan:Once a week

## 2022-04-18 ENCOUNTER — OFFICE VISIT (OUTPATIENT)
Dept: INTEGRATIVE MEDICINE | Facility: CLINIC | Age: 57
End: 2022-04-18
Payer: COMMERCIAL

## 2022-04-18 DIAGNOSIS — M79.672 FOOT PAIN, LEFT: Primary | ICD-10-CM

## 2022-04-25 ENCOUNTER — OFFICE VISIT (OUTPATIENT)
Dept: INTEGRATIVE MEDICINE | Facility: CLINIC | Age: 57
End: 2022-04-25
Payer: COMMERCIAL

## 2022-04-25 DIAGNOSIS — M79.672 LEFT FOOT PAIN: Primary | ICD-10-CM

## 2022-05-11 ENCOUNTER — OFFICE VISIT (OUTPATIENT)
Dept: INTEGRATIVE MEDICINE | Facility: CLINIC | Age: 57
End: 2022-05-11
Payer: COMMERCIAL

## 2022-05-11 DIAGNOSIS — M79.672 LEFT FOOT PAIN: Primary | ICD-10-CM

## 2022-05-11 PROCEDURE — 97810 ACUP 1/> WO ESTIM 1ST 15 MIN: CPT | Performed by: ACUPUNCTURIST

## 2022-05-11 PROCEDURE — 97811 ACUP 1/> W/O ESTIM EA ADD 15: CPT | Performed by: ACUPUNCTURIST

## 2022-05-11 NOTE — PROGRESS NOTES
Chief Complaint(s):  L foot pain  Subjective  Karuna Interiano has seen an increase and decrease in pain in her left foot. Over the last week, she has been having more spasming pain and her toes in her left foot start twitching, edward, and moving on their own when it is bothering her. She is frustrated that there is minimal change that is lasting and keeps coming back. Karuna Interiano continues to use her vibrating massage ball on her foot while she is seated to keep it loose. Her low back has good days and bad and she found that when injections were done in her low back, the foot pain got worse. In comparison to the left hip, her pain in her foot was less. Objective   While she was here upon palpation, her medial arch of her left foot is very tight and the muscles are ropey. Pressing into there arch, you could feel her toes edward and move on their own. We talked about if she has tried anything to separate her toes at night, which she has but then it causes more proximal foot pain on the dorsum of her foot. Assessment      Plan  [facility] DACM performed acupuncture without electrical stimulation for 20 minutes at the following points:R: LGDBYTX; R: LU7    Set two of needles were performed for 20 minutes:L: KD2, GB31, GB34, Ba Kade; R: SP6, KD3    The total Facetime was 23 minutes. Total treatment time was 63 minutes. Next appointment:May 23, 2022    Treatment Plan:Messaged Dr. Ger Shaffer to see about new imaging for her left foot or another plan.

## 2022-05-31 ENCOUNTER — OFFICE VISIT (OUTPATIENT)
Dept: PAIN CLINIC | Facility: CLINIC | Age: 57
End: 2022-05-31
Payer: COMMERCIAL

## 2022-05-31 DIAGNOSIS — M79.672 LEFT FOOT PAIN: Primary | ICD-10-CM

## 2022-05-31 PROCEDURE — 97811 ACUP 1/> W/O ESTIM EA ADD 15: CPT | Performed by: ACUPUNCTURIST

## 2022-05-31 PROCEDURE — 97810 ACUP 1/> WO ESTIM 1ST 15 MIN: CPT | Performed by: ACUPUNCTURIST

## 2022-05-31 NOTE — PATIENT INSTRUCTIONS
Instructions for D-Manose Cranberry pills will be provided in email sent from [facility].

## 2022-06-07 ENCOUNTER — LAB ENCOUNTER (OUTPATIENT)
Dept: LAB | Age: 57
End: 2022-06-07
Attending: FAMILY MEDICINE
Payer: COMMERCIAL

## 2022-06-07 ENCOUNTER — OFFICE VISIT (OUTPATIENT)
Dept: PAIN CLINIC | Facility: CLINIC | Age: 57
End: 2022-06-07
Payer: COMMERCIAL

## 2022-06-07 ENCOUNTER — OFFICE VISIT (OUTPATIENT)
Dept: FAMILY MEDICINE CLINIC | Facility: CLINIC | Age: 57
End: 2022-06-07
Payer: COMMERCIAL

## 2022-06-07 VITALS
SYSTOLIC BLOOD PRESSURE: 122 MMHG | BODY MASS INDEX: 34.06 KG/M2 | RESPIRATION RATE: 18 BRPM | HEIGHT: 67 IN | DIASTOLIC BLOOD PRESSURE: 82 MMHG | WEIGHT: 217 LBS | HEART RATE: 70 BPM | OXYGEN SATURATION: 97 %

## 2022-06-07 DIAGNOSIS — R35.0 URINARY FREQUENCY: ICD-10-CM

## 2022-06-07 DIAGNOSIS — M79.672 LEFT FOOT PAIN: Primary | ICD-10-CM

## 2022-06-07 DIAGNOSIS — J31.0 CHRONIC RHINITIS: ICD-10-CM

## 2022-06-07 DIAGNOSIS — R39.9 UTI SYMPTOMS: Primary | ICD-10-CM

## 2022-06-07 DIAGNOSIS — R31.9 HEMATURIA, UNSPECIFIED TYPE: ICD-10-CM

## 2022-06-07 LAB
ANION GAP SERPL CALC-SCNC: 5 MMOL/L (ref 0–18)
APPEARANCE: CLEAR
BILIRUBIN: NEGATIVE
BUN BLD-MCNC: 13 MG/DL (ref 7–18)
CALCIUM BLD-MCNC: 8.7 MG/DL (ref 8.5–10.1)
CHLORIDE SERPL-SCNC: 109 MMOL/L (ref 98–112)
CO2 SERPL-SCNC: 26 MMOL/L (ref 21–32)
CREAT BLD-MCNC: 0.79 MG/DL
FASTING STATUS PATIENT QL REPORTED: NO
GLUCOSE (URINE DIPSTICK): NEGATIVE MG/DL
GLUCOSE BLD-MCNC: 81 MG/DL (ref 70–99)
KETONES (URINE DIPSTICK): NEGATIVE MG/DL
LEUKOCYTES: NEGATIVE
MULTISTIX EXPIRATION DATE: ABNORMAL DATE
MULTISTIX LOT#: ABNORMAL NUMERIC
NITRITE, URINE: NEGATIVE
OSMOLALITY SERPL CALC.SUM OF ELEC: 289 MOSM/KG (ref 275–295)
OSMOLALITY SERPL: 292 MOSM/KG (ref 280–300)
PH, URINE: 7.5 (ref 4.5–8)
POTASSIUM SERPL-SCNC: 3.9 MMOL/L (ref 3.5–5.1)
PROTEIN (URINE DIPSTICK): NEGATIVE MG/DL
SODIUM SERPL-SCNC: 140 MMOL/L (ref 136–145)
SPECIFIC GRAVITY: 1.01 (ref 1–1.03)
URINE-COLOR: YELLOW
UROBILINOGEN,SEMI-QN: 0.2 MG/DL (ref 0–1.9)

## 2022-06-07 PROCEDURE — 87086 URINE CULTURE/COLONY COUNT: CPT | Performed by: FAMILY MEDICINE

## 2022-06-07 PROCEDURE — 97811 ACUP 1/> W/O ESTIM EA ADD 15: CPT | Performed by: ACUPUNCTURIST

## 2022-06-07 PROCEDURE — 83935 ASSAY OF URINE OSMOLALITY: CPT | Performed by: FAMILY MEDICINE

## 2022-06-07 PROCEDURE — 3008F BODY MASS INDEX DOCD: CPT | Performed by: FAMILY MEDICINE

## 2022-06-07 PROCEDURE — 99214 OFFICE O/P EST MOD 30 MIN: CPT | Performed by: FAMILY MEDICINE

## 2022-06-07 PROCEDURE — 3079F DIAST BP 80-89 MM HG: CPT | Performed by: FAMILY MEDICINE

## 2022-06-07 PROCEDURE — 3074F SYST BP LT 130 MM HG: CPT | Performed by: FAMILY MEDICINE

## 2022-06-07 PROCEDURE — 83930 ASSAY OF BLOOD OSMOLALITY: CPT | Performed by: FAMILY MEDICINE

## 2022-06-07 PROCEDURE — 81003 URINALYSIS AUTO W/O SCOPE: CPT | Performed by: FAMILY MEDICINE

## 2022-06-07 PROCEDURE — 80048 BASIC METABOLIC PNL TOTAL CA: CPT | Performed by: FAMILY MEDICINE

## 2022-06-07 PROCEDURE — 84588 ASSAY OF VASOPRESSIN: CPT | Performed by: FAMILY MEDICINE

## 2022-06-07 PROCEDURE — 97810 ACUP 1/> WO ESTIM 1ST 15 MIN: CPT | Performed by: ACUPUNCTURIST

## 2022-06-07 RX ORDER — PHENTERMINE HYDROCHLORIDE 37.5 MG/1
37.5 TABLET ORAL
COMMUNITY
End: 2022-06-07

## 2022-06-07 RX ORDER — PHENTERMINE HYDROCHLORIDE 37.5 MG/1
37.5 TABLET ORAL
Qty: 30 TABLET | Refills: 2 | Status: SHIPPED | OUTPATIENT
Start: 2022-06-07

## 2022-06-07 RX ORDER — AZELASTINE HYDROCHLORIDE 137 UG/1
1 SPRAY, METERED NASAL 2 TIMES DAILY
Qty: 30 ML | Refills: 0 | Status: SHIPPED | OUTPATIENT
Start: 2022-06-07

## 2022-06-07 RX ORDER — LEVOFLOXACIN 500 MG/1
500 TABLET, FILM COATED ORAL DAILY
Qty: 7 TABLET | Refills: 0 | Status: SHIPPED | OUTPATIENT
Start: 2022-06-07 | End: 2022-06-14

## 2022-06-08 LAB — OSMOLALITY UR: 749 MOSM/KG (ref 300–1300)

## 2022-06-12 LAB — ARGININE VASOPRESSIN HORMONE: 1.2 PG/ML

## 2022-06-14 ENCOUNTER — HOSPITAL ENCOUNTER (OUTPATIENT)
Dept: CT IMAGING | Age: 57
Discharge: HOME | End: 2022-06-14
Attending: FAMILY MEDICINE
Payer: COMMERCIAL

## 2022-06-14 DIAGNOSIS — R35.0 URINARY FREQUENCY: ICD-10-CM

## 2022-06-14 DIAGNOSIS — R31.9 HEMATURIA, UNSPECIFIED TYPE: ICD-10-CM

## 2022-06-14 PROCEDURE — 74176 CT ABD & PELVIS W/O CONTRAST: CPT | Performed by: FAMILY MEDICINE

## 2022-06-15 ENCOUNTER — TELEPHONE (OUTPATIENT)
Dept: FAMILY MEDICINE CLINIC | Facility: CLINIC | Age: 57
End: 2022-06-15

## 2022-06-15 DIAGNOSIS — R31.9 HEMATURIA, UNSPECIFIED TYPE: Primary | ICD-10-CM

## 2022-06-15 NOTE — TELEPHONE ENCOUNTER
----- Message from Angel Venegas MD sent at 6/15/2022  8:10 AM CDT -----  Results reviewed. Released to [address] Tests show no significant abnormalities.   Refer to urology

## 2022-06-15 NOTE — TELEPHONE ENCOUNTER
[employer] message sent to pt with explanation of result and recommendation from provider. Referral placed.

## 2022-06-16 ENCOUNTER — OFFICE VISIT (OUTPATIENT)
Dept: INTEGRATIVE MEDICINE | Facility: CLINIC | Age: 57
End: 2022-06-16
Payer: COMMERCIAL

## 2022-06-16 DIAGNOSIS — M79.672 LEFT FOOT PAIN: Primary | ICD-10-CM

## 2022-06-16 PROCEDURE — 97810 ACUP 1/> WO ESTIM 1ST 15 MIN: CPT | Performed by: ACUPUNCTURIST

## 2022-06-16 PROCEDURE — 97811 ACUP 1/> W/O ESTIM EA ADD 15: CPT | Performed by: ACUPUNCTURIST

## 2022-06-20 ENCOUNTER — OFFICE VISIT (OUTPATIENT)
Dept: INTEGRATIVE MEDICINE | Facility: CLINIC | Age: 57
End: 2022-06-20
Payer: COMMERCIAL

## 2022-06-20 DIAGNOSIS — M79.672 LEFT FOOT PAIN: Primary | ICD-10-CM

## 2022-06-20 DIAGNOSIS — G89.29 CHRONIC LEFT-SIDED LOW BACK PAIN WITH LEFT-SIDED SCIATICA: ICD-10-CM

## 2022-06-20 DIAGNOSIS — M54.42 CHRONIC LEFT-SIDED LOW BACK PAIN WITH LEFT-SIDED SCIATICA: ICD-10-CM

## 2022-06-20 PROCEDURE — 97810 ACUP 1/> WO ESTIM 1ST 15 MIN: CPT | Performed by: ACUPUNCTURIST

## 2022-06-20 PROCEDURE — 97811 ACUP 1/> W/O ESTIM EA ADD 15: CPT | Performed by: ACUPUNCTURIST

## 2022-06-27 ENCOUNTER — OFFICE VISIT (OUTPATIENT)
Dept: INTEGRATIVE MEDICINE | Facility: CLINIC | Age: 57
End: 2022-06-27
Payer: COMMERCIAL

## 2022-06-27 DIAGNOSIS — M79.672 LEFT FOOT PAIN: Primary | ICD-10-CM

## 2022-06-27 PROCEDURE — 97810 ACUP 1/> WO ESTIM 1ST 15 MIN: CPT | Performed by: ACUPUNCTURIST

## 2022-06-27 PROCEDURE — 97811 ACUP 1/> W/O ESTIM EA ADD 15: CPT | Performed by: ACUPUNCTURIST

## 2022-07-25 ENCOUNTER — APPOINTMENT (OUTPATIENT)
Dept: GENERAL RADIOLOGY | Age: 57
End: 2022-07-25
Payer: COMMERCIAL

## 2022-07-25 ENCOUNTER — HOSPITAL ENCOUNTER (EMERGENCY)
Age: 57
Discharge: HOME | End: 2022-07-25
Payer: COMMERCIAL

## 2022-07-25 VITALS
RESPIRATION RATE: 17 BRPM | HEIGHT: 67 IN | OXYGEN SATURATION: 98 % | TEMPERATURE: 98 F | DIASTOLIC BLOOD PRESSURE: 94 MMHG | WEIGHT: 205 LBS | SYSTOLIC BLOOD PRESSURE: 154 MMHG | HEART RATE: 74 BPM | BODY MASS INDEX: 32.18 KG/M2

## 2022-07-25 DIAGNOSIS — M72.2 PLANTAR FASCIITIS: Primary | ICD-10-CM

## 2022-07-25 PROCEDURE — 99283 EMERGENCY DEPT VISIT LOW MDM: CPT

## 2022-07-25 PROCEDURE — 73630 X-RAY EXAM OF FOOT: CPT

## 2022-07-26 ENCOUNTER — OFFICE VISIT (OUTPATIENT)
Dept: PAIN CLINIC | Facility: CLINIC | Age: 57
End: 2022-07-26
Payer: COMMERCIAL

## 2022-07-26 DIAGNOSIS — M79.672 LEFT FOOT PAIN: Primary | ICD-10-CM

## 2022-07-26 PROCEDURE — 97810 ACUP 1/> WO ESTIM 1ST 15 MIN: CPT | Performed by: ACUPUNCTURIST

## 2022-07-26 PROCEDURE — 97811 ACUP 1/> W/O ESTIM EA ADD 15: CPT | Performed by: ACUPUNCTURIST

## 2022-07-26 NOTE — PATIENT INSTRUCTIONS
Practice stretches as demonstrated during appointment. Increase water to 90 oz per day. Take magnesium glycinate 400mg 1 x per day.     Monitor BP at home

## 2022-08-01 ENCOUNTER — OFFICE VISIT (OUTPATIENT)
Dept: PODIATRY CLINIC | Facility: CLINIC | Age: 57
End: 2022-08-01
Payer: COMMERCIAL

## 2022-08-01 DIAGNOSIS — G57.82 NEUROMA OF THIRD INTERSPACE OF LEFT FOOT: Primary | ICD-10-CM

## 2022-08-01 DIAGNOSIS — G57.62 MORTON'S METATARSALGIA, NEURALGIA, OR NEUROMA, LEFT: ICD-10-CM

## 2022-08-01 PROCEDURE — 3074F SYST BP LT 130 MM HG: CPT | Performed by: PODIATRIST

## 2022-08-01 PROCEDURE — 99203 OFFICE O/P NEW LOW 30 MIN: CPT | Performed by: PODIATRIST

## 2022-08-01 PROCEDURE — 64455 NJX AA&/STRD PLTR COM DG NRV: CPT | Performed by: PODIATRIST

## 2022-08-01 PROCEDURE — 3078F DIAST BP <80 MM HG: CPT | Performed by: PODIATRIST

## 2022-08-02 VITALS — DIASTOLIC BLOOD PRESSURE: 68 MMHG | SYSTOLIC BLOOD PRESSURE: 124 MMHG

## 2022-08-02 RX ORDER — TRIAMCINOLONE ACETONIDE 40 MG/ML
20 INJECTION, SUSPENSION INTRA-ARTICULAR; INTRAMUSCULAR ONCE
Status: COMPLETED | OUTPATIENT
Start: 2022-08-02 | End: 2022-08-01

## 2022-08-02 NOTE — PROGRESS NOTES
Per Dr. Dario Kelley draw up 0.5ml of 0.5% Marcaine and 0.5ml of Kenalog 40 for injection to left foot.

## 2022-08-08 ENCOUNTER — OFFICE VISIT (OUTPATIENT)
Dept: INTEGRATIVE MEDICINE | Facility: CLINIC | Age: 57
End: 2022-08-08
Payer: COMMERCIAL

## 2022-08-08 DIAGNOSIS — M79.672 LEFT FOOT PAIN: Primary | ICD-10-CM

## 2022-08-08 PROCEDURE — 97810 ACUP 1/> WO ESTIM 1ST 15 MIN: CPT | Performed by: ACUPUNCTURIST

## 2022-08-08 PROCEDURE — 97811 ACUP 1/> W/O ESTIM EA ADD 15: CPT | Performed by: ACUPUNCTURIST

## 2022-08-08 NOTE — PATIENT INSTRUCTIONS
For pain, can try Scout arias as a topical and apply it via cotton ball. Can be purchased via [employer]. [email or web address] com/NASEEM-ABEL-SHUI-External-Analgesic/dp/[email or web address]/ref=sr_1_1? [email or web address]&keywords=galan+gu+shui&pbb=2985873997&sprefix=galan+gu+shui%2Caps%2C211&sr=8-1    You can apply 1-3 times per day maximum.

## 2022-08-22 ENCOUNTER — OFFICE VISIT (OUTPATIENT)
Dept: PODIATRY CLINIC | Facility: CLINIC | Age: 57
End: 2022-08-22
Payer: COMMERCIAL

## 2022-08-22 DIAGNOSIS — G57.82 NEUROMA OF THIRD INTERSPACE OF LEFT FOOT: Primary | ICD-10-CM

## 2022-08-22 DIAGNOSIS — G57.62 MORTON'S METATARSALGIA, NEURALGIA, OR NEUROMA, LEFT: ICD-10-CM

## 2022-08-22 PROCEDURE — 99213 OFFICE O/P EST LOW 20 MIN: CPT | Performed by: PODIATRIST

## 2022-08-24 ENCOUNTER — TELEPHONE (OUTPATIENT)
Dept: PODIATRY CLINIC | Facility: CLINIC | Age: 57
End: 2022-08-24

## 2022-08-24 DIAGNOSIS — G57.62 MORTON'S METATARSALGIA, NEURALGIA, OR NEUROMA, LEFT: ICD-10-CM

## 2022-08-24 DIAGNOSIS — G57.82 NEUROMA OF THIRD INTERSPACE OF LEFT FOOT: Primary | ICD-10-CM

## 2022-08-24 NOTE — TELEPHONE ENCOUNTER
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

## 2022-08-25 NOTE — TELEPHONE ENCOUNTER
Called patient this date and left a message for her to call me back directly at [phone] to schedule.

## 2022-08-31 ENCOUNTER — OFFICE VISIT (OUTPATIENT)
Dept: INTEGRATIVE MEDICINE | Facility: CLINIC | Age: 57
End: 2022-08-31
Payer: COMMERCIAL

## 2022-08-31 DIAGNOSIS — M79.672 LEFT FOOT PAIN: Primary | ICD-10-CM

## 2022-08-31 DIAGNOSIS — G57.62 MORTON'S NEUROMA OF LEFT FOOT: ICD-10-CM

## 2022-08-31 PROCEDURE — 97810 ACUP 1/> WO ESTIM 1ST 15 MIN: CPT | Performed by: ACUPUNCTURIST

## 2022-08-31 PROCEDURE — 97811 ACUP 1/> W/O ESTIM EA ADD 15: CPT | Performed by: ACUPUNCTURIST

## 2022-08-31 NOTE — PROGRESS NOTES
Maxwell Irizarry is a 65year old female coming in for Acupuncture Therapy. Chief Complaint:L sepulveda neuroma      Self reported health status:     Patient reported severity of symptom(s) over the last 24 hours  With zero reporting no symptoms and 10 reporting the worst severity    Symptom 1:7      Subjective  Response to last TX: Cherrie Saunders is very frustrated that she has to be on FMLA because of her foot pain. She went to a podiatrist and was told that she needs to do more physical therapy for her foot even though she has done it for over three years. 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 She then went to a new podiatrist and was given a boot to wear. It has drained her so much and she doesn't even feel like getting out of bed in the morning and this summer was not enjoyable because of the pain. Another Dr also tried to tell her it was PTSD from the achilles surgery but there is no pain where she had that surgery, it is only the bottom of the foot and between the toes. Objective (Pulse Tongue, Vitals)         Pulse  Thin and wiry    Assessment  TCM Diagnosis:ST and Liver channel stagnation    Plan  CANELO Bartholomew, MANJULA performed acupuncture at [facility] without electrical stimulation for 20 minutes at the following points:Asad Elspeth Crystal between 1-2 and 2-3, KD1    Set two of needles were performed for 20 minutes:LU7,     The total Facetime was 23 minutes. Total treatment time was 63 minutes. Next appointment:9/08/2022    Treatment Plan and Patient Goals:Reduce foot  No follow-ups on file.     Awa Bartholomew  8/31/2022  3:52 PM

## 2022-09-27 ENCOUNTER — OFFICE VISIT (OUTPATIENT)
Dept: PAIN CLINIC | Facility: CLINIC | Age: 57
End: 2022-09-27

## 2022-09-27 ENCOUNTER — TELEPHONE (OUTPATIENT)
Dept: PAIN CLINIC | Facility: CLINIC | Age: 57
End: 2022-09-27

## 2022-09-27 DIAGNOSIS — G89.29 OTHER CHRONIC PAIN: Primary | ICD-10-CM

## 2022-09-27 DIAGNOSIS — M79.672 LEFT FOOT PAIN: Primary | ICD-10-CM

## 2022-09-27 PROCEDURE — 97811 ACUP 1/> W/O ESTIM EA ADD 15: CPT | Performed by: ACUPUNCTURIST

## 2022-09-27 PROCEDURE — 97810 ACUP 1/> WO ESTIM 1ST 15 MIN: CPT | Performed by: ACUPUNCTURIST

## 2022-09-27 RX ORDER — PHENTERMINE HYDROCHLORIDE 37.5 MG/1
37.5 TABLET ORAL
Qty: 30 TABLET | Refills: 2 | Status: SHIPPED | OUTPATIENT
Start: 2022-09-27 | End: 2022-11-30

## 2022-09-27 NOTE — TELEPHONE ENCOUNTER
Referral placed for patient to work with Dr. Dexter Marie pain psychologist as recommended at IDT. Additionally message was sent to [facility], Dr. William Dupont to determine if there was a physical therapist experienced in working with [facility] patients specifically desensitization therapy and if so we would work to coordinate lumbar sympathetic blocks on Tuesday followed by immediate physical therapy work.   This was recommended at the last IDT and if could be coordinated can be an effective treatment if we can do this weekly for 4 weeks and evaluate patient's response

## 2022-10-03 NOTE — TELEPHONE ENCOUNTER
Contacted pt to offer to schedule an OV for documentation purposes. LM advising pt to contact office to schedule an OV or video visit for documentation purposes to gain auth on NBs that are to be performed prior to PT appts.

## 2022-11-28 DIAGNOSIS — Z00.00 LABORATORY EXAMINATION ORDERED AS PART OF A ROUTINE GENERAL MEDICAL EXAMINATION: ICD-10-CM

## 2022-11-28 DIAGNOSIS — R73.01 IMPAIRED FASTING BLOOD SUGAR: Primary | ICD-10-CM

## 2022-11-29 ENCOUNTER — LAB ENCOUNTER (OUTPATIENT)
Dept: LAB | Age: 57
End: 2022-11-29
Attending: FAMILY MEDICINE
Payer: COMMERCIAL

## 2022-11-29 DIAGNOSIS — R73.01 IMPAIRED FASTING BLOOD SUGAR: ICD-10-CM

## 2022-11-29 DIAGNOSIS — Z00.00 LABORATORY EXAMINATION ORDERED AS PART OF A ROUTINE GENERAL MEDICAL EXAMINATION: ICD-10-CM

## 2022-11-29 LAB
ALBUMIN SERPL-MCNC: 3.7 G/DL (ref 3.4–5)
ALBUMIN/GLOB SERPL: 1.2 {RATIO} (ref 1–2)
ALP LIVER SERPL-CCNC: 86 U/L
ALT SERPL-CCNC: 31 U/L
ANION GAP SERPL CALC-SCNC: 4 MMOL/L (ref 0–18)
AST SERPL-CCNC: 20 U/L (ref 15–37)
BASOPHILS # BLD AUTO: 0.03 X10(3) UL (ref 0–0.2)
BASOPHILS NFR BLD AUTO: 0.6 %
BILIRUB SERPL-MCNC: 0.4 MG/DL (ref 0.1–2)
BUN BLD-MCNC: 18 MG/DL (ref 7–18)
CALCIUM BLD-MCNC: 8.7 MG/DL (ref 8.5–10.1)
CHLORIDE SERPL-SCNC: 108 MMOL/L (ref 98–112)
CHOLEST SERPL-MCNC: 249 MG/DL (ref ?–200)
CO2 SERPL-SCNC: 29 MMOL/L (ref 21–32)
CREAT BLD-MCNC: 0.79 MG/DL
EOSINOPHIL # BLD AUTO: 0.18 X10(3) UL (ref 0–0.7)
EOSINOPHIL NFR BLD AUTO: 3.3 %
ERYTHROCYTE [DISTWIDTH] IN BLOOD BY AUTOMATED COUNT: 12.7 %
EST. AVERAGE GLUCOSE BLD GHB EST-MCNC: 108 MG/DL (ref 68–126)
FASTING PATIENT LIPID ANSWER: YES
FASTING STATUS PATIENT QL REPORTED: YES
GFR SERPLBLD BASED ON 1.73 SQ M-ARVRAT: 88 ML/MIN/1.73M2 (ref 60–?)
GLOBULIN PLAS-MCNC: 3.2 G/DL (ref 2.8–4.4)
GLUCOSE BLD-MCNC: 105 MG/DL (ref 70–99)
HBA1C MFR BLD: 5.4 % (ref ?–5.7)
HCT VFR BLD AUTO: 41.3 %
HDLC SERPL-MCNC: 49 MG/DL (ref 40–59)
HGB BLD-MCNC: 13.6 G/DL
IMM GRANULOCYTES # BLD AUTO: 0.01 X10(3) UL (ref 0–1)
IMM GRANULOCYTES NFR BLD: 0.2 %
LDLC SERPL CALC-MCNC: 153 MG/DL (ref ?–100)
LYMPHOCYTES # BLD AUTO: 2.03 X10(3) UL (ref 1–4)
LYMPHOCYTES NFR BLD AUTO: 37.6 %
MCH RBC QN AUTO: 31.3 PG (ref 26–34)
MCHC RBC AUTO-ENTMCNC: 32.9 G/DL (ref 31–37)
MCV RBC AUTO: 94.9 FL
MONOCYTES # BLD AUTO: 0.44 X10(3) UL (ref 0.1–1)
MONOCYTES NFR BLD AUTO: 8.1 %
NEUTROPHILS # BLD AUTO: 2.71 X10 (3) UL (ref 1.5–7.7)
NEUTROPHILS # BLD AUTO: 2.71 X10(3) UL (ref 1.5–7.7)
NEUTROPHILS NFR BLD AUTO: 50.2 %
NONHDLC SERPL-MCNC: 200 MG/DL (ref ?–130)
OSMOLALITY SERPL CALC.SUM OF ELEC: 294 MOSM/KG (ref 275–295)
PLATELET # BLD AUTO: 246 10(3)UL (ref 150–450)
POTASSIUM SERPL-SCNC: 3.9 MMOL/L (ref 3.5–5.1)
PROT SERPL-MCNC: 6.9 G/DL (ref 6.4–8.2)
RBC # BLD AUTO: 4.35 X10(6)UL
SODIUM SERPL-SCNC: 141 MMOL/L (ref 136–145)
TRIGL SERPL-MCNC: 255 MG/DL (ref 30–149)
TSI SER-ACNC: 2.44 MIU/ML (ref 0.36–3.74)
VLDLC SERPL CALC-MCNC: 50 MG/DL (ref 0–30)
WBC # BLD AUTO: 5.4 X10(3) UL (ref 4–11)

## 2022-11-29 PROCEDURE — 80061 LIPID PANEL: CPT | Performed by: FAMILY MEDICINE

## 2022-11-29 PROCEDURE — 83036 HEMOGLOBIN GLYCOSYLATED A1C: CPT | Performed by: FAMILY MEDICINE

## 2022-11-29 PROCEDURE — 80050 GENERAL HEALTH PANEL: CPT | Performed by: FAMILY MEDICINE

## 2022-11-30 ENCOUNTER — OFFICE VISIT (OUTPATIENT)
Dept: FAMILY MEDICINE CLINIC | Facility: CLINIC | Age: 57
End: 2022-11-30
Payer: COMMERCIAL

## 2022-11-30 VITALS
OXYGEN SATURATION: 99 % | HEIGHT: 67 IN | HEART RATE: 88 BPM | SYSTOLIC BLOOD PRESSURE: 140 MMHG | WEIGHT: 212 LBS | DIASTOLIC BLOOD PRESSURE: 90 MMHG | TEMPERATURE: 98 F | RESPIRATION RATE: 16 BRPM | BODY MASS INDEX: 33.27 KG/M2

## 2022-11-30 DIAGNOSIS — Z12.4 SCREENING FOR CERVICAL CANCER: ICD-10-CM

## 2022-11-30 DIAGNOSIS — I10 HYPERTENSION, UNSPECIFIED TYPE: ICD-10-CM

## 2022-11-30 DIAGNOSIS — R73.01 IMPAIRED FASTING BLOOD SUGAR: ICD-10-CM

## 2022-11-30 DIAGNOSIS — Z01.419 WELL WOMAN EXAM WITH ROUTINE GYNECOLOGICAL EXAM: Primary | ICD-10-CM

## 2022-11-30 DIAGNOSIS — E66.9 OBESITY (BMI 30-39.9): ICD-10-CM

## 2022-11-30 DIAGNOSIS — Z12.31 ENCOUNTER FOR SCREENING MAMMOGRAM FOR MALIGNANT NEOPLASM OF BREAST: ICD-10-CM

## 2022-11-30 DIAGNOSIS — E78.2 MIXED HYPERLIPIDEMIA: ICD-10-CM

## 2022-11-30 PROCEDURE — 99396 PREV VISIT EST AGE 40-64: CPT | Performed by: FAMILY MEDICINE

## 2022-11-30 PROCEDURE — 99212 OFFICE O/P EST SF 10 MIN: CPT | Performed by: FAMILY MEDICINE

## 2022-11-30 PROCEDURE — 3008F BODY MASS INDEX DOCD: CPT | Performed by: FAMILY MEDICINE

## 2022-11-30 PROCEDURE — 87624 HPV HI-RISK TYP POOLED RSLT: CPT | Performed by: FAMILY MEDICINE

## 2022-11-30 PROCEDURE — 3080F DIAST BP >= 90 MM HG: CPT | Performed by: FAMILY MEDICINE

## 2022-11-30 PROCEDURE — 3077F SYST BP >= 140 MM HG: CPT | Performed by: FAMILY MEDICINE

## 2022-11-30 NOTE — PATIENT INSTRUCTIONS
Please follow a heart healthy diet including lean meats (like chicken breast, turkey breast) or fish, fresh fruits and vegetables and limit/avoid processed foods and fast foods.        [address] [facility] [address]  [address] [place] [address] Drive  [facility] and [id number] [place] [address] 50, [address] José Fisher  Main Number: [phone]

## 2022-12-01 DIAGNOSIS — I10 HYPERTENSION, UNSPECIFIED TYPE: ICD-10-CM

## 2022-12-01 LAB — HPV I/H RISK 1 DNA SPEC QL NAA+PROBE: NEGATIVE

## 2022-12-02 RX ORDER — HYDROCHLOROTHIAZIDE 25 MG/1
TABLET ORAL
Qty: 90 TABLET | Refills: 1 | OUTPATIENT
Start: 2022-12-02

## 2022-12-02 RX ORDER — HYDROCHLOROTHIAZIDE 25 MG/1
TABLET ORAL
Qty: 90 TABLET | Refills: 1 | Status: SHIPPED | OUTPATIENT
Start: 2022-12-02

## 2022-12-02 RX ORDER — PHENTERMINE HYDROCHLORIDE 37.5 MG/1
37.5 TABLET ORAL
Qty: 30 TABLET | Refills: 3 | Status: SHIPPED | OUTPATIENT
Start: 2022-12-02

## 2022-12-22 ENCOUNTER — TELEMEDICINE (OUTPATIENT)
Dept: TELEHEALTH | Age: 57
End: 2022-12-22

## 2022-12-22 DIAGNOSIS — J01.40 ACUTE NON-RECURRENT PANSINUSITIS: Primary | ICD-10-CM

## 2022-12-22 DIAGNOSIS — R09.81 NASAL CONGESTION: ICD-10-CM

## 2022-12-22 RX ORDER — AMOXICILLIN AND CLAVULANATE POTASSIUM 875; 125 MG/1; MG/1
1 TABLET, FILM COATED ORAL 2 TIMES DAILY
Qty: 20 TABLET | Refills: 0 | Status: SHIPPED | OUTPATIENT
Start: 2022-12-22 | End: 2023-01-01

## 2023-01-06 ENCOUNTER — TELEPHONE (OUTPATIENT)
Dept: PHYSICAL MEDICINE AND REHAB | Facility: CLINIC | Age: 58
End: 2023-01-06

## 2023-01-11 ENCOUNTER — HOSPITAL ENCOUNTER (OUTPATIENT)
Dept: MAMMOGRAPHY | Age: 58
Discharge: HOME | End: 2023-01-11
Attending: FAMILY MEDICINE
Payer: COMMERCIAL

## 2023-01-11 DIAGNOSIS — Z12.31 ENCOUNTER FOR SCREENING MAMMOGRAM FOR MALIGNANT NEOPLASM OF BREAST: ICD-10-CM

## 2023-01-11 PROCEDURE — 77067 SCR MAMMO BI INCL CAD: CPT | Performed by: FAMILY MEDICINE

## 2023-01-11 PROCEDURE — 77063 BREAST TOMOSYNTHESIS BI: CPT | Performed by: FAMILY MEDICINE

## 2023-01-12 ENCOUNTER — TELEPHONE (OUTPATIENT)
Dept: PHYSICAL MEDICINE AND REHAB | Facility: CLINIC | Age: 58
End: 2023-01-12

## 2023-01-13 ENCOUNTER — PATIENT MESSAGE (OUTPATIENT)
Dept: PHYSICAL MEDICINE AND REHAB | Facility: CLINIC | Age: 58
End: 2023-01-13

## 2023-01-13 DIAGNOSIS — M79.2 NEUROPATHIC PAIN: ICD-10-CM

## 2023-01-16 NOTE — TELEPHONE ENCOUNTER
Refill Request    Medication request: CELECOXIB 100 MG Oral Cap  Sig: TAKE 1 CAPSULE TWICE A DAY AS NEEDED FOR PAIN    LOV: 1/11/2022 Telemed  Due back to clinic per last office note:  N/A  NOV: Visit date not found      [place]: 4/11/2022 #180 R-3    Urine drug screen (if applicable): N/A  Pain contract: N/A    LOV plan (if weaning or changing medications):  In the meantime she will continue the celebrex

## 2023-01-17 RX ORDER — CELECOXIB 100 MG/1
100 CAPSULE ORAL 2 TIMES DAILY PRN
Qty: 60 CAPSULE | Refills: 0 | Status: SHIPPED | OUTPATIENT
Start: 2023-01-17

## 2023-01-17 NOTE — TELEPHONE ENCOUNTER
S/w [facility] Representative to discuss previous Celecoxib order. From 1 representative there was an issue regarding co-pay, was transferred before any follow-up questions were able to be asked by this RN. S/w Devyn HINDS Medication was discontinued or \"delivery was cancelled\" d/t Patient's insurance; prior authorization required if patient is to remain on Celebrex. Naproxen - never tried  Meloxicam - patient has already tried and failed. S/w Dr. Jenny Santillan who stated - this is a long term medication patient has been on since 2020, will place new order & request for PA. Will also put patient in for a follow-up appointment in case new medications need to be tried.

## 2023-01-17 NOTE — TELEPHONE ENCOUNTER
S/w patient who was frustrated at the delay in refill. Informed patient that I was in the process of trying to get in touch with her Pharmacy - [facility] to determine why patient has 2 refills remaining and why they wouldn't release any more. Apologized for the delay and sent a 30 day supply to patient requested [id number] at this time. Informed patient that I will continue to try and reach out to the pharmacy to determine where the delay is coming from.

## 2023-01-17 NOTE — TELEPHONE ENCOUNTER
I am confused - does she need a refill sent to a physical pharmacy, or a refill to [facility]? She should follow up; there are other medications we can try.

## 2023-01-18 RX ORDER — CELECOXIB 100 MG/1
100 CAPSULE ORAL 2 TIMES DAILY PRN
Qty: 180 CAPSULE | Refills: 3 | Status: CANCELLED
Start: 2023-02-16

## 2023-02-05 ENCOUNTER — PATIENT MESSAGE (OUTPATIENT)
Dept: PHYSICAL MEDICINE AND REHAB | Facility: CLINIC | Age: 58
End: 2023-02-05

## 2023-02-10 ENCOUNTER — OFFICE VISIT (OUTPATIENT)
Dept: PHYSICAL MEDICINE AND REHAB | Facility: CLINIC | Age: 58
End: 2023-02-10
Payer: COMMERCIAL

## 2023-02-10 VITALS
WEIGHT: 195 LBS | DIASTOLIC BLOOD PRESSURE: 88 MMHG | HEART RATE: 84 BPM | SYSTOLIC BLOOD PRESSURE: 124 MMHG | HEIGHT: 67 IN | BODY MASS INDEX: 30.61 KG/M2 | OXYGEN SATURATION: 95 %

## 2023-02-10 DIAGNOSIS — G62.9 NEUROPATHY: ICD-10-CM

## 2023-02-10 DIAGNOSIS — M79.2 NEUROPATHIC PAIN: Primary | ICD-10-CM

## 2023-02-10 PROCEDURE — 3008F BODY MASS INDEX DOCD: CPT | Performed by: PHYSICAL MEDICINE & REHABILITATION

## 2023-02-10 PROCEDURE — 3079F DIAST BP 80-89 MM HG: CPT | Performed by: PHYSICAL MEDICINE & REHABILITATION

## 2023-02-10 PROCEDURE — 99213 OFFICE O/P EST LOW 20 MIN: CPT | Performed by: PHYSICAL MEDICINE & REHABILITATION

## 2023-02-10 PROCEDURE — 3074F SYST BP LT 130 MM HG: CPT | Performed by: PHYSICAL MEDICINE & REHABILITATION

## 2023-02-10 RX ORDER — NORTRIPTYLINE HYDROCHLORIDE 10 MG/1
CAPSULE ORAL
Qty: 60 CAPSULE | Refills: 0 | Status: SHIPPED | OUTPATIENT
Start: 2023-02-10

## 2023-02-23 ENCOUNTER — TELEPHONE (OUTPATIENT)
Dept: PHYSICAL THERAPY | Facility: HOSPITAL | Age: 58
End: 2023-02-23

## 2023-02-27 ENCOUNTER — OFFICE VISIT (OUTPATIENT)
Dept: PHYSICAL THERAPY | Age: 58
End: 2023-02-27
Attending: PHYSICAL MEDICINE & REHABILITATION
Payer: COMMERCIAL

## 2023-02-27 DIAGNOSIS — M79.2 NEUROPATHIC PAIN: ICD-10-CM

## 2023-02-27 DIAGNOSIS — G62.9 NEUROPATHY: ICD-10-CM

## 2023-02-27 PROCEDURE — 97110 THERAPEUTIC EXERCISES: CPT

## 2023-02-27 PROCEDURE — 97161 PT EVAL LOW COMPLEX 20 MIN: CPT

## 2023-03-02 ENCOUNTER — PATIENT MESSAGE (OUTPATIENT)
Dept: FAMILY MEDICINE CLINIC | Facility: CLINIC | Age: 58
End: 2023-03-02

## 2023-03-10 ENCOUNTER — OFFICE VISIT (OUTPATIENT)
Dept: PHYSICAL THERAPY | Age: 58
End: 2023-03-10
Attending: PHYSICAL MEDICINE & REHABILITATION
Payer: COMMERCIAL

## 2023-03-10 PROCEDURE — 97140 MANUAL THERAPY 1/> REGIONS: CPT

## 2023-03-10 PROCEDURE — 97110 THERAPEUTIC EXERCISES: CPT

## 2023-03-10 NOTE — PROGRESS NOTES
Dx: Left Achilles Tendinitis         Authorized # of Visits:  12 (PPO)         Next MD visit: none scheduled  Fall Risk: standard         Precautions: n/a         Goal Number: 12    Subjective: States that she had a \"burning\" pain deep to the calf. States that her shin \"itched\" for a long time after her work shift. Objective: Treatment progressed per treatment log. Date:3/10/2023 TX#: 2/12 Date:               TX#: 3/   Date:               TX#: 4/ Date:               TX#: 5/ Date:               TX#: 6/ Date:               TX#: 7/ Date:               TX#: 8/   VAS 7-8/10 VAS /10 VAS /10 VAS /10 VAS /10 VAS /10 VAS /10   TherEx (15 Min)         Upright bike x 6 min         ProStretch 30 sec x 3         Doorway stretch 10 sec x 5                                             Manual PT (25 Min)         Posterior talocrual glides with distraction         DTM to gastrocsoleus complex                    Assessment: The patient responded well to today's intervention. Was able to replicate HEP issued during initial exam.        Goals (12 visits):    1. Increase FOTO assessment > 11% from INE to DC. 2. Patient will return to increased brandon ambulation without increased pain  3. Patient will have symmetrical capsular mobility of the posterior hip and talocrual joint for posterior glide. 4. Patient will have normalized single leg stance and calf raise when compared to the opposite side. Plan: Assess response to today's treatment and progress as tolerated. Charges: TherEx 1 (15 min);  Manual PT 2 (25 min)      Total Timed Treatment: 40 min  Total Treatment Time: 40 min

## 2023-03-13 ENCOUNTER — APPOINTMENT (OUTPATIENT)
Dept: PHYSICAL THERAPY | Age: 58
End: 2023-03-13
Attending: PHYSICAL MEDICINE & REHABILITATION
Payer: COMMERCIAL

## 2023-03-19 ENCOUNTER — PATIENT MESSAGE (OUTPATIENT)
Dept: PHYSICAL MEDICINE AND REHAB | Facility: CLINIC | Age: 58
End: 2023-03-19

## 2023-03-20 ENCOUNTER — OFFICE VISIT (OUTPATIENT)
Dept: PHYSICAL THERAPY | Age: 58
End: 2023-03-20
Attending: PHYSICAL MEDICINE & REHABILITATION
Payer: COMMERCIAL

## 2023-03-20 ENCOUNTER — OFFICE VISIT (OUTPATIENT)
Dept: FAMILY MEDICINE CLINIC | Facility: CLINIC | Age: 58
End: 2023-03-20
Payer: COMMERCIAL

## 2023-03-20 VITALS
DIASTOLIC BLOOD PRESSURE: 90 MMHG | OXYGEN SATURATION: 99 % | HEIGHT: 67 IN | RESPIRATION RATE: 16 BRPM | BODY MASS INDEX: 32.8 KG/M2 | TEMPERATURE: 98 F | SYSTOLIC BLOOD PRESSURE: 140 MMHG | HEART RATE: 95 BPM | WEIGHT: 209 LBS

## 2023-03-20 DIAGNOSIS — M79.2 NEUROPATHIC PAIN: ICD-10-CM

## 2023-03-20 DIAGNOSIS — E66.9 OBESITY (BMI 30-39.9): Primary | ICD-10-CM

## 2023-03-20 PROCEDURE — 3080F DIAST BP >= 90 MM HG: CPT | Performed by: FAMILY MEDICINE

## 2023-03-20 PROCEDURE — 97140 MANUAL THERAPY 1/> REGIONS: CPT

## 2023-03-20 PROCEDURE — 3077F SYST BP >= 140 MM HG: CPT | Performed by: FAMILY MEDICINE

## 2023-03-20 PROCEDURE — 3008F BODY MASS INDEX DOCD: CPT | Performed by: FAMILY MEDICINE

## 2023-03-20 PROCEDURE — 97110 THERAPEUTIC EXERCISES: CPT

## 2023-03-20 PROCEDURE — 99214 OFFICE O/P EST MOD 30 MIN: CPT | Performed by: FAMILY MEDICINE

## 2023-03-20 RX ORDER — CELECOXIB 100 MG/1
100 CAPSULE ORAL 2 TIMES DAILY PRN
Qty: 180 CAPSULE | Refills: 2 | Status: SHIPPED | OUTPATIENT
Start: 2023-03-20

## 2023-03-20 NOTE — TELEPHONE ENCOUNTER
From: Murphy Councilman  To: Malgorzata Putnam DO  Sent: 3/19/2023 6:07 AM CDT  Subject: Nortriptyline    Good Morning- I've been having what I believe are side effects from the medication Nortriptyline, strange and scary dreams that are waking me in the middle of the night . A few nights i've had unsettling nightmares. I am also groggy in the daytime and anxious more than usual. Can we switch my medication back to Celebrex, & call it into the pharmacy where I can  the prescription?   Thank you

## 2023-03-20 NOTE — TELEPHONE ENCOUNTER
OK to stop the nortriptyline and switch back to previous celebrex dosing. Please clarify what pharmacy she would like the medication to be sent to.

## 2023-03-20 NOTE — PROGRESS NOTES
Dx: Left Achilles Tendinitis         Authorized # of Visits:  12 (PPO)         Next MD visit: none scheduled  Fall Risk: standard         Precautions: n/a         Goal Number: 12    Subjective: States that after last time her pain was up for 3 days. States that afterward the foot returned to the baseline level of pain. States that when she pushes her foot downward the right one will platarflex and the left will not. Objective: Treatment progressed per treatment log. Date:3/10/2023 TX#: 2/12 Date:3/20/2023  TX#: 3/   Date:               TX#: 4/ Date:               TX#: 5/ Date:               TX#: 6/ Date:               TX#: 7/ Date:               TX#: 8/   VAS 7-8/10 VAS 5/10 VAS /10 VAS /10 VAS /10 VAS /10 VAS /10   TherEx (15 Min) TherEx (15 Min)        Upright bike x 6 min Upright bike x 6 min        ProStretch 30 sec x 3 ProStretch 30 sec x 3        Doorway stretch 10 sec x 5 Doorway stretch 10 sec x 5         Quadruped dorsiflexion through sitting backward 30 sec x 3                                   Manual PT (25 Min) Manual PT (25 Min)        Posterior talocrual glides with distraction Posterior talocrual glides with distraction        DTM to gastrocsoleus complex DTM to gastrocsoleus complex                   Assessment: The patient responded well to today's intervention. Increased motion with PROM at the joint after manipulation. Goals (12 visits):    1. Increase FOTO assessment > 11% from INE to DC. 2. Patient will return to increased brandon ambulation without increased pain  3. Patient will have symmetrical capsular mobility of the posterior hip and talocrual joint for posterior glide. 4. Patient will have normalized single leg stance and calf raise when compared to the opposite side. Plan: Assess response to today's treatment and progress as tolerated. Charges: TherEx 1 (15 min);  Manual PT 2 (25 min)      Total Timed Treatment: 40 min  Total Treatment Time: 40 min

## 2023-03-20 NOTE — PATIENT INSTRUCTIONS
If wegovy (weekly) injection not covered with coupon card then let office know and we can try prescribing saxenda (daily) injections       [employer]. [email or web address]/products/[email or web address]/savings-offer.html

## 2023-03-21 ENCOUNTER — TELEPHONE (OUTPATIENT)
Dept: FAMILY MEDICINE CLINIC | Facility: CLINIC | Age: 58
End: 2023-03-21

## 2023-03-21 ENCOUNTER — PATIENT MESSAGE (OUTPATIENT)
Dept: FAMILY MEDICINE CLINIC | Facility: CLINIC | Age: 58
End: 2023-03-21

## 2023-03-21 NOTE — TELEPHONE ENCOUNTER
Patient states that [place] are too expensive. Requesting alternative. Or to continue phentermine and add metformin. Please advise.

## 2023-03-21 NOTE — TELEPHONE ENCOUNTER
Pt called stating semaglutide-weight management 0.25 MG/0.5ML Subcutaneous Solution Auto-injector is too expensive and would like an alternative from provider.

## 2023-03-21 NOTE — TELEPHONE ENCOUNTER
From: Nenita Hill  To: Jani Vegas MD  Sent: 3/21/2023 3:19 PM CDT  Subject: [address] filled this prescription, and after insurance the cost is 1,553.00. The [place]a is $1399.00 after insurance. In office we discussed a cheaper alternative, is there one that insurance will cover at a reasonable cost? I cannot afford either one of these prescriptions. Should I continue the phentermine, and add metformin? Thanks for your help with this. Currently I have one refill of Phentermine.

## 2023-03-28 ENCOUNTER — OFFICE VISIT (OUTPATIENT)
Dept: PHYSICAL THERAPY | Age: 58
End: 2023-03-28
Attending: PHYSICAL MEDICINE & REHABILITATION
Payer: COMMERCIAL

## 2023-03-28 PROCEDURE — 97140 MANUAL THERAPY 1/> REGIONS: CPT

## 2023-03-28 PROCEDURE — 97110 THERAPEUTIC EXERCISES: CPT

## 2023-03-28 NOTE — PROGRESS NOTES
Dx: Left Achilles Tendinitis         Authorized # of Visits:  12 (PPO)         Next MD visit: none scheduled  Fall Risk: standard         Precautions: n/a         Goal Number: 12    Subjective: States that she had great relief from last treatment. States it has stiffened somewhat in the past couple of days, but not as bad as before. Objective: Treatment progressed per treatment log. Date:3/10/2023 TX#: 2/12 Date:3/20/2023  TX#: 3/   Date:3/28/2023  TX#: 4/ Date:               TX#: 5/ Date:               TX#: 6/ Date:               TX#: 7/ Date:               TX#: 8/   VAS 7-8/10 VAS 5/10 VAS 5/10 VAS /10 VAS /10 VAS /10 VAS /10   TherEx (15 Min) TherEx (15 Min) TherEx (15 Min)       Upright bike x 6 min Upright bike x 6 min Upright bike x 6 min       ProStretch 30 sec x 3 ProStretch 30 sec x 3 ProStretch 30 sec x 3       Doorway stretch 10 sec x 5 Doorway stretch 10 sec x 5 Doorway stretch 10 sec x 5        Quadruped dorsiflexion through sitting backward 30 sec x 3                                   Manual PT (25 Min) Manual PT (25 Min) Manual PT (25 Min)       Posterior talocrual glides with distraction Posterior talocrual glides with distraction Posterior talocrual glides with distraction       DTM to gastrocsoleus complex DTM to gastrocsoleus complex DTM to gastrocsoleus complex                  Assessment: The patient responded well to today's intervention. Increased accessory motion with inversion and eversion after manipulation. Goals (12 visits):    1. Increase FOTO assessment > 11% from INE to DC. 2. Patient will return to increased brandon ambulation without increased pain  3. Patient will have symmetrical capsular mobility of the posterior hip and talocrual joint for posterior glide. 4. Patient will have normalized single leg stance and calf raise when compared to the opposite side. Plan: Assess response to today's treatment and progress as tolerated. Charges:  TherEx 1 (15 min); Manual PT 2 (25 min)      Total Timed Treatment: 40 min  Total Treatment Time: 40 min

## 2023-04-03 ENCOUNTER — OFFICE VISIT (OUTPATIENT)
Dept: PHYSICAL THERAPY | Age: 58
End: 2023-04-03
Attending: PHYSICAL MEDICINE & REHABILITATION
Payer: COMMERCIAL

## 2023-04-03 PROCEDURE — 97110 THERAPEUTIC EXERCISES: CPT

## 2023-04-03 PROCEDURE — 97140 MANUAL THERAPY 1/> REGIONS: CPT

## 2023-04-03 NOTE — PROGRESS NOTES
Dx: Left Achilles Tendinitis         Authorized # of Visits:  12 (PPO)         Next MD visit: none scheduled  Fall Risk: standard         Precautions: n/a         Goal Number: 12    Subjective: Foot symptoms fluctuate. States that it is more sore today. Objective: Treatment progressed per treatment log. Date:3/10/2023 TX#: 2/12 Date:3/20/2023  TX#: 3/   Date:3/28/2023  TX#: 4/ Date:4/3/2023  TX#: 5/ Date:               TX#: 6/ Date:               TX#: 7/ Date:               TX#: 8/   VAS 7-8/10 VAS 5/10 VAS 5/10 VAS 5/10 VAS /10 VAS /10 VAS /10   TherEx (15 Min) TherEx (15 Min) TherEx (15 Min) TherEx (15 Min)      Upright bike x 6 min Upright bike x 6 min Upright bike x 6 min ProStretch 30 sec x 3      ProStretch 30 sec x 3 ProStretch 30 sec x 3 ProStretch 30 sec x 3 ProStretch 30 sec x 3      Doorway stretch 10 sec x 5 Doorway stretch 10 sec x 5 Doorway stretch 10 sec x 5 Quadriped plantarflexion stretch       Quadruped dorsiflexion through sitting backward 30 sec x 3                                   Manual PT (25 Min) Manual PT (25 Min) Manual PT (25 Min) Manual PT (25 Min)      Posterior talocrual glides with distraction Posterior talocrual glides with distraction Posterior talocrual glides with distraction Posterior talocrual glides with distraction      DTM to gastrocsoleus complex DTM to gastrocsoleus complex DTM to gastrocsoleus complex                  Assessment: The patient responded well to today's intervention. Advised patient to hold on orthotics for trial to promote foot motion in general.        Goals (12 visits):    1. Increase FOTO assessment > 11% from INE to DC. 2. Patient will return to increased brandon ambulation without increased pain  3. Patient will have symmetrical capsular mobility of the posterior hip and talocrual joint for posterior glide. 4. Patient will have normalized single leg stance and calf raise when compared to the opposite side.       Plan: Assess response to today's treatment and progress as tolerated. Charges: TherEx 1 (15 min);  Manual PT 2 (25 min)      Total Timed Treatment: 40 min  Total Treatment Time: 40 min

## 2023-04-10 ENCOUNTER — TELEPHONE (OUTPATIENT)
Dept: PHYSICAL THERAPY | Facility: HOSPITAL | Age: 58
End: 2023-04-10

## 2023-04-12 ENCOUNTER — APPOINTMENT (OUTPATIENT)
Dept: PHYSICAL THERAPY | Age: 58
End: 2023-04-12
Attending: PHYSICAL MEDICINE & REHABILITATION
Payer: COMMERCIAL

## 2023-04-14 ENCOUNTER — APPOINTMENT (OUTPATIENT)
Dept: PHYSICAL THERAPY | Age: 58
End: 2023-04-14
Attending: PHYSICAL MEDICINE & REHABILITATION
Payer: COMMERCIAL

## 2023-04-21 ENCOUNTER — OFFICE VISIT (OUTPATIENT)
Dept: PHYSICAL THERAPY | Age: 58
End: 2023-04-21
Attending: PHYSICAL MEDICINE & REHABILITATION
Payer: COMMERCIAL

## 2023-04-21 PROCEDURE — 97110 THERAPEUTIC EXERCISES: CPT

## 2023-04-21 PROCEDURE — 97140 MANUAL THERAPY 1/> REGIONS: CPT

## 2023-04-21 NOTE — PROGRESS NOTES
Dx: Left Achilles Tendinitis         Authorized # of Visits:  12 (PPO)         Next MD visit: none scheduled  Fall Risk: standard         Precautions: n/a         Goal Number: 12    Subjective: Foot symptoms fluctuate. States that today is a good day. Objective: Treatment progressed per treatment log. Date:3/10/2023 TX#: 2/12 Date:3/20/2023  TX#: 3/   Date:3/28/2023  TX#: 4/ Date:4/3/2023  TX#: 5/ Date:4/21/2023  TX#: 6/ Date:               TX#: 7/ Date:               TX#: 8/   VAS 7-8/10 VAS 5/10 VAS 5/10 VAS 5/10 VAS 4/10 VAS /10 VAS /10   TherEx (15 Min) TherEx (15 Min) TherEx (15 Min) TherEx (15 Min) TherEx (15 Min)     Upright bike x 6 min Upright bike x 6 min Upright bike x 6 min ProStretch 30 sec x 3 ProStretch 30 sec x 3     ProStretch 30 sec x 3 ProStretch 30 sec x 3 ProStretch 30 sec x 3 ProStretch 30 sec x 3 Upright bike x 6 min     Doorway stretch 10 sec x 5 Doorway stretch 10 sec x 5 Doorway stretch 10 sec x 5 Quadriped plantarflexion stretch Quadriped plantarflexion stretch      Quadruped dorsiflexion through sitting backward 30 sec x 3   Doorway stretch 10 sec x 5                                Manual PT (25 Min) Manual PT (25 Min) Manual PT (25 Min) Manual PT (25 Min) Manual PT (25 Min)     Posterior talocrual glides with distraction Posterior talocrual glides with distraction Posterior talocrual glides with distraction Posterior talocrual glides with distraction Posterior talocrual glides with distraction     DTM to gastrocsoleus complex DTM to gastrocsoleus complex DTM to gastrocsoleus complex                  Assessment: The patient responded well to today's intervention. Advised patient to hold on orthotics for trial to promote foot motion in general.        Goals (12 visits):    1. Increase FOTO assessment > 11% from INE to DC. 2. Patient will return to increased brandon ambulation without increased pain  3.  Patient will have symmetrical capsular mobility of the posterior hip and talocrual joint for posterior glide. 4. Patient will have normalized single leg stance and calf raise when compared to the opposite side. Plan: Assess response to today's treatment and progress as tolerated. Charges: TherEx 1 (15 min);  Manual PT 2 (25 min)      Total Timed Treatment: 40 min  Total Treatment Time: 40 min

## 2023-04-24 ENCOUNTER — APPOINTMENT (OUTPATIENT)
Dept: PHYSICAL THERAPY | Age: 58
End: 2023-04-24
Attending: PHYSICAL MEDICINE & REHABILITATION
Payer: COMMERCIAL

## 2023-04-24 NOTE — TELEPHONE ENCOUNTER
Pt states Encinal Showman is too expensive with ins. Pt wondering about Ozempic instead. Pls advise.

## 2023-04-26 RX ORDER — PHENTERMINE HYDROCHLORIDE 37.5 MG/1
37.5 TABLET ORAL
Qty: 30 TABLET | Refills: 3 | Status: SHIPPED | OUTPATIENT
Start: 2023-04-26

## 2023-04-28 ENCOUNTER — OFFICE VISIT (OUTPATIENT)
Dept: PHYSICAL THERAPY | Age: 58
End: 2023-04-28
Attending: PHYSICAL MEDICINE & REHABILITATION
Payer: COMMERCIAL

## 2023-04-28 PROCEDURE — 97140 MANUAL THERAPY 1/> REGIONS: CPT

## 2023-04-28 PROCEDURE — 97110 THERAPEUTIC EXERCISES: CPT

## 2023-04-28 NOTE — PROGRESS NOTES
Dx: Left Achilles Tendinitis         Authorized # of Visits:  12 (PPO)         Next MD visit: none scheduled  Fall Risk: standard         Precautions: n/a         Goal Number: 12    Subjective: States that the foot feels like a \"baloon. \"  States that the toes are the largest issue. Objective: Treatment progressed per treatment log. Date:3/10/2023 TX#: 2/12 Date:3/20/2023  TX#: 3/   Date:3/28/2023  TX#: 4/ Date:4/3/2023  TX#: 5/ Date:4/21/2023  TX#: 6/ Date:4/28/2023  TX#: 7/ Date:               TX#: 8/   VAS 7-8/10 VAS 5/10 VAS 5/10 VAS 5/10 VAS 4/10 VAS 3-4/10 VAS /10   TherEx (15 Min) TherEx (15 Min) TherEx (15 Min) TherEx (15 Min) TherEx (15 Min) TherEx (15 Min)    Upright bike x 6 min Upright bike x 6 min Upright bike x 6 min ProStretch 30 sec x 3 ProStretch 30 sec x 3 ProStretch 30 sec x 3    ProStretch 30 sec x 3 ProStretch 30 sec x 3 ProStretch 30 sec x 3 ProStretch 30 sec x 3 Upright bike x 6 min Doorway stretch 10 sec x 5    Doorway stretch 10 sec x 5 Doorway stretch 10 sec x 5 Doorway stretch 10 sec x 5 Quadriped plantarflexion stretch Quadriped plantarflexion stretch Upright bike x 6 min     Quadruped dorsiflexion through sitting backward 30 sec x 3   Doorway stretch 10 sec x 5 Quadriped plantarflexion stretch         TRX Posterior squats and lunges x 20                      Manual PT (25 Min) Manual PT (25 Min) Manual PT (25 Min) Manual PT (25 Min) Manual PT (25 Min) Manual PT (25 Min)    Posterior talocrual glides with distraction Posterior talocrual glides with distraction Posterior talocrual glides with distraction Posterior talocrual glides with distraction Posterior talocrual glides with distraction Posterior talocrual glides with distraction    DTM to gastrocsoleus complex DTM to gastrocsoleus complex DTM to gastrocsoleus complex   PROM/Flexibility for toes into plantarflexion         DTM to plantar fascia x 5 min      Assessment: The patient responded well to today's intervention. Discussed orthotics and whether or not they are necessary for her situation. She eluded to the fact that the foot feels better without the orthotics. Goals (12 visits):    1. Increase FOTO assessment > 11% from INE to DC. 2. Patient will return to increased brandon ambulation without increased pain  3. Patient will have symmetrical capsular mobility of the posterior hip and talocrual joint for posterior glide. 4. Patient will have normalized single leg stance and calf raise when compared to the opposite side. Plan: Assess response to today's treatment and progress as tolerated. Charges: TherEx 1 (15 min);  Manual PT 2 (25 min)      Total Timed Treatment: 40 min  Total Treatment Time: 40 min

## 2023-05-03 ENCOUNTER — APPOINTMENT (OUTPATIENT)
Dept: PHYSICAL THERAPY | Age: 58
End: 2023-05-03
Attending: PHYSICAL MEDICINE & REHABILITATION
Payer: COMMERCIAL

## 2023-05-10 ENCOUNTER — OFFICE VISIT (OUTPATIENT)
Dept: PHYSICAL THERAPY | Age: 58
End: 2023-05-10
Attending: PHYSICAL MEDICINE & REHABILITATION
Payer: COMMERCIAL

## 2023-05-10 PROCEDURE — 97110 THERAPEUTIC EXERCISES: CPT

## 2023-05-10 PROCEDURE — 97140 MANUAL THERAPY 1/> REGIONS: CPT

## 2023-05-10 NOTE — PROGRESS NOTES
Dx: Left Achilles Tendinitis         Authorized # of Visits:  12 (PPO)         Next MD visit: none scheduled  Fall Risk: standard         Precautions: n/a         Goal Number: 12    Subjective: Foot feels tight. Little pain. Objective: Treatment progressed per treatment log.       Date:3/10/2023 TX#: 2/12 Date:3/20/2023  TX#: 3/   Date:3/28/2023  TX#: 4/ Date:4/3/2023  TX#: 5/ Date:4/21/2023  TX#: 6/ Date:4/28/2023  TX#: 7/ Date:5/10/2023  TX#: 8/   VAS 7-8/10 VAS 5/10 VAS 5/10 VAS 5/10 VAS 4/10 VAS 3-4/10 VAS 4/10   TherEx (15 Min) TherEx (15 Min) TherEx (15 Min) TherEx (15 Min) TherEx (15 Min) TherEx (15 Min) TherEx (15 Min)   Upright bike x 6 min Upright bike x 6 min Upright bike x 6 min ProStretch 30 sec x 3 ProStretch 30 sec x 3 ProStretch 30 sec x 3 ProStretch 30 sec x 3   ProStretch 30 sec x 3 ProStretch 30 sec x 3 ProStretch 30 sec x 3 ProStretch 30 sec x 3 Upright bike x 6 min Doorway stretch 10 sec x 5 Doorway stretch 10 sec x 5   Doorway stretch 10 sec x 5 Doorway stretch 10 sec x 5 Doorway stretch 10 sec x 5 Quadriped plantarflexion stretch Quadriped plantarflexion stretch Upright bike x 6 min Quadriped plantarflexion stretch    Quadruped dorsiflexion through sitting backward 30 sec x 3   Doorway stretch 10 sec x 5 Quadriped plantarflexion stretch Upright bike x 6 min        TRX Posterior squats and lunges x 20 TRX Posterior squats and lunges x 20         8 minute Bike            Manual PT (25 Min) Manual PT (25 Min) Manual PT (25 Min) Manual PT (25 Min) Manual PT (25 Min) Manual PT (25 Min) Manual PT (25 Min)   Posterior talocrual glides with distraction Posterior talocrual glides with distraction Posterior talocrual glides with distraction Posterior talocrual glides with distraction Posterior talocrual glides with distraction Posterior talocrual glides with distraction Posterior talocrual glides with distraction   DTM to gastrocsoleus complex DTM to gastrocsoleus complex DTM to gastrocsoleus complex   PROM/Flexibility for toes into plantarflexion DTM to plantar fascia x 5 min        DTM to plantar fascia x 5 min PROM/Flexibility for toes into plantarflexion     Assessment: The patient responded well to today's intervention. Restrictions at the talocrual joint improved. Goals (12 visits):    1. Increase FOTO assessment > 11% from INE to DC. 2. Patient will return to increased brandon ambulation without increased pain  3. Patient will have symmetrical capsular mobility of the posterior hip and talocrual joint for posterior glide. 4. Patient will have normalized single leg stance and calf raise when compared to the opposite side. Plan: Assess response to today's treatment and progress as tolerated. Charges: TherEx 1 (15 min);  Manual PT 2 (25 min)      Total Timed Treatment: 40 min  Total Treatment Time: 40 min

## 2023-05-12 ENCOUNTER — OFFICE VISIT (OUTPATIENT)
Dept: PHYSICAL THERAPY | Age: 58
End: 2023-05-12
Attending: PHYSICAL MEDICINE & REHABILITATION
Payer: COMMERCIAL

## 2023-05-12 PROCEDURE — 97140 MANUAL THERAPY 1/> REGIONS: CPT

## 2023-05-12 NOTE — PROGRESS NOTES
Dx: Left Achilles Tendinitis         Authorized # of Visits:  12 (PPO)         Next MD visit: none scheduled  Fall Risk: standard         Precautions: n/a         Goal Number: 12    Subjective: Foot feels tight. States that she feels lasers coming out of her toes and throbbing pain at the bottom of the foot. Objective: Treatment progressed per treatment log. Coming from spin class. Date:4/21/2023  TX#: 6/ Date:4/28/2023  TX#: 7/ Date:5/10/2023  TX#: 8/ 5/12/2023  TX: 9     VAS 4/10 VAS 3-4/10 VAS 4/10 VAS 4/10     TherEx (15 Min) TherEx (15 Min) TherEx (15 Min) TherEx (15 Min)     ProStretch 30 sec x 3 ProStretch 30 sec x 3 ProStretch 30 sec x 3 ProStretch 30 sec x 3     Upright bike x 6 min Doorway stretch 10 sec x 5 Doorway stretch 10 sec x 5      Quadriped plantarflexion stretch Upright bike x 6 min Quadriped plantarflexion stretch      Doorway stretch 10 sec x 5 Quadriped plantarflexion stretch Upright bike x 6 min       TRX Posterior squats and lunges x 20 TRX Posterior squats and lunges x 20        8 minute Bike              Manual PT (25 Min) Manual PT (25 Min) Manual PT (25 Min) Manual PT (40 Min)     Posterior talocrual glides with distraction Posterior talocrual glides with distraction Posterior talocrual glides with distraction Posterior talocrual glides with distraction      PROM/Flexibility for toes into plantarflexion DTM to plantar fascia x 5 min Sustained EHL stretch 30 sec x 5      DTM to plantar fascia x 5 min PROM/Flexibility for toes into plantarflexion Graded IP manipulation rays 1-4       Assessment: The patient responded well to today's intervention. Increased toe flexion ROM after treatment. Advised patient to carry on with stretches at home. Goals (12 visits):    1. Increase FOTO assessment > 11% from INE to DC. 2. Patient will return to increased brandon ambulation without increased pain  3.  Patient will have symmetrical capsular mobility of the posterior hip and talocrual joint for posterior glide. 4. Patient will have normalized single leg stance and calf raise when compared to the opposite side. Plan: Assess response to today's treatment and progress as tolerated.       Charges: Manual PT 3 (40 min)      Total Timed Treatment: 40 min  Total Treatment Time: 40 min

## 2023-05-15 ENCOUNTER — OFFICE VISIT (OUTPATIENT)
Dept: PHYSICAL THERAPY | Age: 58
End: 2023-05-15
Attending: PHYSICAL MEDICINE & REHABILITATION
Payer: COMMERCIAL

## 2023-05-15 PROCEDURE — 97140 MANUAL THERAPY 1/> REGIONS: CPT

## 2023-05-15 PROCEDURE — 97110 THERAPEUTIC EXERCISES: CPT

## 2023-05-15 NOTE — PROGRESS NOTES
Dx: Left Achilles Tendinitis         Authorized # of Visits:  12 (PPO)         Next MD visit: none scheduled  Fall Risk: standard         Precautions: n/a         Goal Number: 12    Subjective: States that she was having some spasms across the foot. Objective: Treatment progressed per treatment log. Coming from [employer] class. Date:4/21/2023  TX#: 6/ Date:4/28/2023  TX#: 7/ Date:5/10/2023  TX#: 8/ 5/12/2023  TX: 9 5/15/2023  TX: 10    VAS 4/10 VAS 3-4/10 VAS 4/10 VAS 4/10 VAS 4/10    TherEx (15 Min) TherEx (15 Min) TherEx (15 Min) TherEx (15 Min) TherEx (15 Min)    ProStretch 30 sec x 3 ProStretch 30 sec x 3 ProStretch 30 sec x 3 ProStretch 30 sec x 3 ProStretch 30 sec x 3    Upright bike x 6 min Doorway stretch 10 sec x 5 Doorway stretch 10 sec x 5  Upright bike x 6 min    Quadriped plantarflexion stretch Upright bike x 6 min Quadriped plantarflexion stretch  TRX Posterior squats and lunges x 20    Doorway stretch 10 sec x 5 Quadriped plantarflexion stretch Upright bike x 6 min       TRX Posterior squats and lunges x 20 TRX Posterior squats and lunges x 20        8 minute Bike              Manual PT (25 Min) Manual PT (25 Min) Manual PT (25 Min) Manual PT (40 Min) Manual PT (40 Min)    Posterior talocrual glides with distraction Posterior talocrual glides with distraction Posterior talocrual glides with distraction Posterior talocrual glides with distraction Posterior talocrual glides with distraction     PROM/Flexibility for toes into plantarflexion DTM to plantar fascia x 5 min Sustained EHL stretch 30 sec x 5 Sustained EHL stretch 30 sec x 5     DTM to plantar fascia x 5 min PROM/Flexibility for toes into plantarflexion Graded IP manipulation rays 1-4 Graded IP manipulation rays 1-4      Assessment: The patient responded well to today's intervention. Increased toe flexion ROM after treatment. Advised patient to carry on with stretches at home. Goals (12 visits):    1.  Increase FOTO assessment > 11% from INE to DC. 2. Patient will return to increased brandon ambulation without increased pain  3. Patient will have symmetrical capsular mobility of the posterior hip and talocrual joint for posterior glide. 4. Patient will have normalized single leg stance and calf raise when compared to the opposite side. Plan: Assess response to today's treatment and progress as tolerated.       Charges: Manual PT 3 (40 min)      Total Timed Treatment: 40 min  Total Treatment Time: 40 min

## 2023-05-16 ENCOUNTER — TELEPHONE (OUTPATIENT)
Dept: PHYSICAL THERAPY | Facility: HOSPITAL | Age: 58
End: 2023-05-16

## 2023-05-16 ENCOUNTER — APPOINTMENT (OUTPATIENT)
Dept: PHYSICAL THERAPY | Age: 58
End: 2023-05-16
Attending: PHYSICAL MEDICINE & REHABILITATION
Payer: COMMERCIAL

## 2023-05-24 NOTE — TELEPHONE ENCOUNTER
From: Asha Andrews  To: Reji Christina MD  Sent: 3/2/2023 9:43 AM CST  Subject: Medication Question    Good Morning- We discussed changing my meds from Phentermine to another, 1 shot monthly or oral. Would i be able to make a virtual or in person appt to do so?   Thank you Cimzia Counseling:  I discussed with the patient the risks of Cimzia including but not limited to immunosuppression, allergic reactions and infections.  The patient understands that monitoring is required including a PPD at baseline and must alert us or the primary physician if symptoms of infection or other concerning signs are noted.

## 2023-05-28 NOTE — PROGRESS NOTES
CC: Patient presents with:  Medication Follow-Up  Derm Problem: lower left leg red/blisters         HPI:  Pt here to f/u for phentermine. Pt has lost  12  lbs.  Pt denies any shortness of breath, chest pain, swelling of ankles and feet, flluttering of heart ankle   PSYCH: no low self est. No depression.     EXAM:   /80   Pulse 92   Resp 16   Ht 5' 7\" (1.702 m)   Wt 203 lb (92.1 kg)   SpO2 97%   BMI 31.79 kg/m²   GENERAL: well developed, well nourished,in no apparent distress  HEENT: atraumatic, normocep PCP: Dr Wallace at [facility]   Cardio / Pulm: Denies having one   GI: Patient doesn't recall

## 2023-06-02 ENCOUNTER — APPOINTMENT (OUTPATIENT)
Dept: PHYSICAL THERAPY | Age: 58
End: 2023-06-02
Attending: PHYSICAL MEDICINE & REHABILITATION
Payer: COMMERCIAL

## 2023-06-02 PROCEDURE — 97110 THERAPEUTIC EXERCISES: CPT

## 2023-06-02 NOTE — PROGRESS NOTES
Dx: Left Achilles Tendinitis         Authorized # of Visits:  12 (PPO)         Next MD visit: none scheduled  Fall Risk: standard         Precautions: n/a         Goal Number: 12    Subjective: New Danco shoes have increased her pain. Stiffness and soreness persists. Objective: Treatment progressed per treatment log. Coming from [employer] class.       Date:4/21/2023  TX#: 6/ Date:4/28/2023  TX#: 7/ Date:5/10/2023  TX#: 8/ 5/12/2023  TX: 9 5/15/2023  TX: 10 6/2/2023  TX: 11   VAS 4/10 VAS 3-4/10 VAS 4/10 VAS 4/10 VAS 4/10 VAS 4/10   TherEx (15 Min) TherEx (15 Min) TherEx (15 Min) TherEx (15 Min) TherEx (15 Min) TherEx (15 Min)   ProStretch 30 sec x 3 ProStretch 30 sec x 3 ProStretch 30 sec x 3 ProStretch 30 sec x 3 ProStretch 30 sec x 3 ProStretch 30 sec x 3   Upright bike x 6 min Doorway stretch 10 sec x 5 Doorway stretch 10 sec x 5  Upright bike x 6 min Upright bike x 6 min   Quadriped plantarflexion stretch Upright bike x 6 min Quadriped plantarflexion stretch  TRX Posterior squats and lunges x 20 Single LEg calf raises with opposite counterbalance support (HEP)   Doorway stretch 10 sec x 5 Quadriped plantarflexion stretch Upright bike x 6 min   Heel to toe calf stretch 30 sec x 3    TRX Posterior squats and lunges x 20 TRX Posterior squats and lunges x 20        8 minute Bike              Manual PT (25 Min) Manual PT (25 Min) Manual PT (25 Min) Manual PT (40 Min) Manual PT (40 Min) Manual PT (40 Min)   Posterior talocrual glides with distraction Posterior talocrual glides with distraction Posterior talocrual glides with distraction Posterior talocrual glides with distraction Posterior talocrual glides with distraction Posterior talocrual glides with distraction    PROM/Flexibility for toes into plantarflexion DTM to plantar fascia x 5 min Sustained EHL stretch 30 sec x 5 Sustained EHL stretch 30 sec x 5 Sustained EHL stretch 30 sec x 5    DTM to plantar fascia x 5 min PROM/Flexibility for toes into plantarflexion Graded IP manipulation rays 1-4 Graded IP manipulation rays 1-4 Graded IP manipulation rays 1-4 - Mostly sustained distraction through the ray. Assessment: The patient responded well to today's intervention. Increased strength at the ankle with heel raise and increased toe mobility following manipulation. Goals (12 visits):    1. Increase FOTO assessment > 11% from INE to DC. 2. Patient will return to increased brandon ambulation without increased pain  3. Patient will have symmetrical capsular mobility of the posterior hip and talocrual joint for posterior glide. 4. Patient will have normalized single leg stance and calf raise when compared to the opposite side. Plan: Assess response to today's treatment and progress as tolerated.       Charges: Manual PT 3 (40 min)      Total Timed Treatment: 40 min  Total Treatment Time: 40 min

## 2023-06-05 ENCOUNTER — OFFICE VISIT (OUTPATIENT)
Dept: PHYSICAL THERAPY | Age: 58
End: 2023-06-05
Attending: PHYSICAL MEDICINE & REHABILITATION
Payer: COMMERCIAL

## 2023-06-05 PROCEDURE — 97140 MANUAL THERAPY 1/> REGIONS: CPT

## 2023-06-05 PROCEDURE — 97110 THERAPEUTIC EXERCISES: CPT

## 2023-06-05 NOTE — PROGRESS NOTES
Dx: Left Achilles Tendinitis         Authorized # of Visits:  12 (PPO)         Next MD visit: none scheduled  Fall Risk: standard         Precautions: n/a         Goal Number: 12    Subjective: States that she has been doing well. Some soreness in the evening that is stiff in the morning. Good throughout the day with intermittent \"shooting\" pains throughout the day. Objective: Treatment progressed per treatment log.      5/12/2023  TX: 9 5/15/2023  TX: 10 6/2/2023  TX: 11 6/5/2023  TX: 12     VAS 4/10 VAS 4/10 VAS 4/10      TherEx (15 Min) TherEx (15 Min) TherEx (15 Min) TherEx (15 Min)     ProStretch 30 sec x 3 ProStretch 30 sec x 3 ProStretch 30 sec x 3 ProStretch 30 sec x 3      Upright bike x 6 min Upright bike x 6 min Upright bike x 6 min      TRX Posterior squats and lunges x 20 Single LEg calf raises with opposite counterbalance support (HEP) Single LEg calf raises with opposite counterbalance support (HEP)       Heel to toe calf stretch 30 sec x 3 Kettle ball squats x 20 20#        TRX Posterior Squat x 20                     Manual PT (40 Min) Manual PT (40 Min) Manual PT (40 Min) Manual PT (40 Min)     Posterior talocrual glides with distraction Posterior talocrual glides with distraction Posterior talocrual glides with distraction Posterior talocrual glides with distraction     Sustained EHL stretch 30 sec x 5 Sustained EHL stretch 30 sec x 5 Sustained EHL stretch 30 sec x 5 Sustained EHL stretch 30 sec x 5     Graded IP manipulation rays 1-4 Graded IP manipulation rays 1-4 Graded IP manipulation rays 1-4 - Mostly sustained distraction through the ray. Graded IP manipulation rays 1-4 - Mostly sustained distraction through the ray       Assessment: The patient responded well to today's intervention. Increased talocrual joint mobility near equal to the opposite side. Goals (12 visits):    1. Increase FOTO assessment > 11% from INE to DC.   2. Patient will return to increased brandon ambulation without increased pain  3. Patient will have symmetrical capsular mobility of the posterior hip and talocrual joint for posterior glide. 4. Patient will have normalized single leg stance and calf raise when compared to the opposite side. Plan: Assess response to today's treatment and progress as tolerated.       Charges: Manual PT 3 (40 min)      Total Timed Treatment: 40 min  Total Treatment Time: 40 min

## 2023-06-12 ENCOUNTER — HOSPITAL ENCOUNTER (EMERGENCY)
Age: 58
Discharge: HOME | End: 2023-06-12
Attending: EMERGENCY MEDICINE
Payer: COMMERCIAL

## 2023-06-12 ENCOUNTER — TELEMEDICINE (OUTPATIENT)
Dept: TELEHEALTH | Age: 58
End: 2023-06-12

## 2023-06-12 VITALS
HEIGHT: 67 IN | SYSTOLIC BLOOD PRESSURE: 141 MMHG | HEART RATE: 84 BPM | TEMPERATURE: 99 F | OXYGEN SATURATION: 94 % | BODY MASS INDEX: 31.39 KG/M2 | DIASTOLIC BLOOD PRESSURE: 89 MMHG | WEIGHT: 200 LBS | RESPIRATION RATE: 18 BRPM

## 2023-06-12 DIAGNOSIS — Z02.9 ADMINISTRATIVE ENCOUNTER: Primary | ICD-10-CM

## 2023-06-12 DIAGNOSIS — J06.9 VIRAL UPPER RESPIRATORY ILLNESS: Primary | ICD-10-CM

## 2023-06-12 LAB
FLUAV + FLUBV RNA SPEC NAA+PROBE: NEGATIVE
FLUAV + FLUBV RNA SPEC NAA+PROBE: NEGATIVE
RSV RNA SPEC NAA+PROBE: NEGATIVE
SARS-COV-2 RNA RESP QL NAA+PROBE: NOT DETECTED
SARS-COV-2 RNA RESP QL NAA+PROBE: NOT DETECTED

## 2023-06-12 PROCEDURE — 99283 EMERGENCY DEPT VISIT LOW MDM: CPT

## 2023-06-12 PROCEDURE — 0241U SARS-COV-2/FLU A AND B/RSV BY PCR (GENEXPERT): CPT | Performed by: EMERGENCY MEDICINE

## 2023-06-13 NOTE — PROGRESS NOTES
Patient presents via Video Visit on Demand. Patient consents to conducting the visit virtually and acknowledges limitations without an in person examination. Patient complains that she is concerned that she might have COVID. Pt states that she developed a sore throat, nasal and sinus congestion, loss of taste, a cough with some chest tightness and shortness of breath. She has a mild dry cough. States that this all came on over the past 3 hours. Patient is bundled in a blanket. She is ill-appearing and is anxious appearing. Patient is winded with speaking. No accessory use or distress. Patient's anxiety and SOB seem to increase with further discussion. Suggested that she be seen at our [facility]. Patient is in favor of this. Advised patient to have her  wear two masks in the car while driving her to the ED. Place of the [facility]. Patient seen. Negative for COVID. Discharged to home. Video Visit cancelled with no charge.

## 2023-06-13 NOTE — DISCHARGE INSTRUCTIONS
Theraflu, motrin, mucinex, fluids     Rest and tylenol and motrin for chills and body aches  Retest for covid in 3 days if still symptomatic

## 2023-06-28 ENCOUNTER — OFFICE VISIT (OUTPATIENT)
Dept: PHYSICAL THERAPY | Age: 58
End: 2023-06-28
Attending: PHYSICAL MEDICINE & REHABILITATION
Payer: COMMERCIAL

## 2023-06-28 PROCEDURE — 97140 MANUAL THERAPY 1/> REGIONS: CPT

## 2023-08-07 NOTE — TELEPHONE ENCOUNTER
Medication request: celecoxib 100 MG Oral Cap  Sig:   Take 1 capsule (100 mg total) by mouth 2 (two) times daily as needed for Pain.     LOV: 5/26/21  NOV: None    [place]: 8/10/21 qty#60 r-0 Render Risk Assessment In Note?: no Additional Notes: Recommended f/u with Dr. Roderick Alexander if she would like cyst to be removed Detail Level: Simple

## 2023-08-13 ENCOUNTER — OFFICE VISIT (OUTPATIENT)
Dept: FAMILY MEDICINE CLINIC | Facility: CLINIC | Age: 58
End: 2023-08-13
Payer: COMMERCIAL

## 2023-08-13 VITALS
BODY MASS INDEX: 32.96 KG/M2 | WEIGHT: 210 LBS | SYSTOLIC BLOOD PRESSURE: 152 MMHG | DIASTOLIC BLOOD PRESSURE: 84 MMHG | HEART RATE: 77 BPM | OXYGEN SATURATION: 99 % | RESPIRATION RATE: 18 BRPM | HEIGHT: 67 IN | TEMPERATURE: 98 F

## 2023-08-13 DIAGNOSIS — I10 HYPERTENSION, UNSPECIFIED TYPE: ICD-10-CM

## 2023-08-13 DIAGNOSIS — U07.1 POSITIVE SELF-ADMINISTERED ANTIGEN TEST FOR COVID-19: ICD-10-CM

## 2023-08-13 DIAGNOSIS — U07.1 COVID-19: Primary | ICD-10-CM

## 2023-08-13 PROBLEM — D36.13 NEUROMA OF FOOT: Status: ACTIVE | Noted: 2022-11-04

## 2023-08-13 LAB
OPERATOR ID: ABNORMAL
POCT LOT NUMBER: ABNORMAL
RAPID SARS-COV-2 BY PCR: DETECTED

## 2023-08-13 PROCEDURE — 99213 OFFICE O/P EST LOW 20 MIN: CPT

## 2023-08-13 PROCEDURE — U0002 COVID-19 LAB TEST NON-CDC: HCPCS

## 2023-08-13 PROCEDURE — 3008F BODY MASS INDEX DOCD: CPT

## 2023-08-13 PROCEDURE — 3077F SYST BP >= 140 MM HG: CPT

## 2023-08-13 PROCEDURE — 3079F DIAST BP 80-89 MM HG: CPT

## 2023-08-26 DIAGNOSIS — I10 HYPERTENSION, UNSPECIFIED TYPE: ICD-10-CM

## 2023-08-28 RX ORDER — PHENTERMINE HYDROCHLORIDE 37.5 MG/1
37.5 TABLET ORAL
Qty: 30 TABLET | Refills: 0 | OUTPATIENT
Start: 2023-08-28

## 2023-08-28 RX ORDER — HYDROCHLOROTHIAZIDE 25 MG/1
TABLET ORAL
Qty: 90 TABLET | Refills: 1 | Status: SHIPPED | OUTPATIENT
Start: 2023-08-28

## 2023-08-28 RX ORDER — PHENTERMINE HYDROCHLORIDE 37.5 MG/1
37.5 TABLET ORAL
Qty: 30 TABLET | Refills: 3 | Status: SHIPPED | OUTPATIENT
Start: 2023-08-28

## 2023-10-05 NOTE — Clinical Note
Dear Margaret Dunlap MD Has lost a total of #20 lbs since starting!  Bernard Tracey  is participating in our medical weight-loss program. We have been working together on making lifestyle changes regarding nutrition, behaviors, and physical act dizziness

## 2023-11-03 ENCOUNTER — LAB ENCOUNTER (OUTPATIENT)
Dept: LAB | Age: 58
End: 2023-11-03
Attending: FAMILY MEDICINE
Payer: COMMERCIAL

## 2023-11-03 DIAGNOSIS — R73.01 IMPAIRED FASTING BLOOD SUGAR: ICD-10-CM

## 2023-11-03 LAB
EST. AVERAGE GLUCOSE BLD GHB EST-MCNC: 111 MG/DL (ref 68–126)
HBA1C MFR BLD: 5.5 % (ref ?–5.7)

## 2023-11-03 PROCEDURE — 83036 HEMOGLOBIN GLYCOSYLATED A1C: CPT | Performed by: FAMILY MEDICINE

## 2023-11-17 ENCOUNTER — TELEMEDICINE (OUTPATIENT)
Dept: FAMILY MEDICINE CLINIC | Facility: CLINIC | Age: 58
End: 2023-11-17
Payer: COMMERCIAL

## 2023-11-17 DIAGNOSIS — R05.1 ACUTE COUGH: ICD-10-CM

## 2023-11-17 DIAGNOSIS — J01.40 ACUTE NON-RECURRENT PANSINUSITIS: Primary | ICD-10-CM

## 2023-11-17 DIAGNOSIS — H10.32 ACUTE BACTERIAL CONJUNCTIVITIS OF LEFT EYE: ICD-10-CM

## 2023-11-17 RX ORDER — AMOXICILLIN AND CLAVULANATE POTASSIUM 875; 125 MG/1; MG/1
1 TABLET, FILM COATED ORAL 2 TIMES DAILY
Qty: 20 TABLET | Refills: 0 | Status: SHIPPED | OUTPATIENT
Start: 2023-11-17 | End: 2023-11-27

## 2023-11-17 RX ORDER — BENZONATATE 100 MG/1
100 CAPSULE ORAL 3 TIMES DAILY PRN
Qty: 30 CAPSULE | Refills: 0 | Status: SHIPPED | OUTPATIENT
Start: 2023-11-17

## 2023-11-17 RX ORDER — CIPROFLOXACIN HYDROCHLORIDE 3.5 MG/ML
2 SOLUTION/ DROPS TOPICAL
Qty: 10 ML | Refills: 0 | Status: SHIPPED | OUTPATIENT
Start: 2023-11-17

## 2023-11-17 NOTE — PROGRESS NOTES
Due to the real risk of possible exposure to Coronavirus (CoV-2, COVID-19) in the office/medical building and recommendations for social distancing (key to mitigation/limiting the spread of the virus) a Virtual or Telemedicine visit over the phone was performed as below. Patient has consented to the Virtual/Telephone Check-In service and expresses understanding and accepts financial responsibility for any deductible, co-insurance and/or co-pays associated with this service. Telehealth outside of [address] Verbal Consent   I conducted a telehealth visit with Leana Rodriguez today, 11/17/23, which was completed using two-way, real-time interactive audio and video communication. This has been done in good anaid to provide continuity of care in the best interest of the provider-patient relationship, due to the COVID -19 public health crisis/national emergency where restrictions of face-to-face office visits are ongoing. Every conscious effort was taken to allow for sufficient and adequate time to complete the visit. The patient was made aware of the limitations of the telehealth visit, including treatment limitations as no physical exam could be performed. The patient was advised to call 911 or to go to the ER in case there was an emergency. The patient was also advised of the potential privacy & security concerns related to the telehealth platform. The patient was made aware of where to find [facility] notice of privacy practices, telehealth consent form and other related consent forms and documents. which are located on the [facility] website. The patient verbally agreed to telehealth consent form, related consents and the risks discussed. Lastly, the patient confirmed that they were in [place]. Included in this visit, time may have been spent reviewing labs, medications, radiology tests and decision making.  Appropriate medical decision-making and tests are ordered as detailed in the plan of care above.  Coding/billing information is submitted for this visit based on complexity of care and/or time spent for the visit. HPI:  Chief Complaint   Patient presents with    Follow - Up     Illness       Zahida East is a 63year old female who calls for complaints of:    Illness symptoms since 10 days ago. Felt like a cold but symptoms are worse. Has some exudate from eyes, L eye red. Ears clogged, voice hoarse. +Congestion, facial pressure, headaches. Denies fevers. Home COVID- 2 days ago. Mild cough. Has tried Theraflu, DayQuil/NyQuil, Flonase, saline nasal spray. Denies vision changes. ROS:  GEN: Denies fever, chills. +Fatigue. HEENT: See HPI. CARDIOVASCULAR: Denies chest pain, dyspnea. RESPIRATORY: Denies dyspnea. +Cough. GI: Denies n/v/d/c, appetite normal.  NEURO: +Headaches. Physical Exam:  GEN:  Patient is alert, awake and oriented, well developed, well nourished. HEENT: L eye erythematous. LUNGS: Patient speaks clearly in full sentences without dyspnea, no cough while on video visit. PSYCH: Appropriate mood and affect. No Known Allergies  Current Outpatient Medications   Medication Sig Dispense Refill    hydroCHLOROthiazide 25 MG Oral Tab TAKE 1 TABLET DAILY 90 tablet 1    Phentermine HCl 37.5 MG Oral Tab Take 1 tablet (37.5 mg total) by mouth before breakfast. 30 tablet 3    celecoxib 100 MG Oral Cap Take 1 capsule (100 mg total) by mouth 2 (two) times daily as needed for Pain.  180 capsule 2     Past Medical History:   Diagnosis Date    Acne     Back pain     Dysmenorrhea     Hematuria     Palpitation     Unspecified essential hypertension     UTI (lower urinary tract infection)      Past Surgical History:   Procedure Laterality Date          COLONOSCOPY N/A 2017    Procedure: COLONOSCOPY, POSSIBLE BIOPSY, POSSIBLE POLYPECTOMY 22468;  Surgeon: Francisco Wood MD;  Location: [address] [place] [address]  - repeat 10 yrs    polyp; tics; hyperplastic    ENDOMETRIAL ABLATION  2015    REPAIR ACHILLES TENDON,PRIMARY           ASSESSMENT AND PLAN:   1. Patient is a 63year old female who calls for: Sinusitis    Additional Assessment and Plan:  Acute sinusitis  -Will treat with Augmentin PO BID x 10 days. To take with food. Side effects reviewed. Continue symptomatic treatment OTC PRN. See me 1-2 weeks in person if no improvement. 2. Acute conjunctivitis L eye  -Treat with ciprofloxacin drops as prescribed. If this transfers to R eye can treat as well. Warm compresses to remove exudate recommended. Wash hands thoroughly and avoid touching eye. Can use Lysol wipes to disinfect surfaces in home. Wash towels/washcloths after each use. 3. Acute cough  -Benzonatate TID PRN. Follow up with me 1-2 weeks PRN in-person    Call and/or go to the ER if worsening symptoms including, but not limited to: respiratory distress, shortness of breath and  wheezing, worsening fever, cough and mental status. The patient (or patient's parent if <19 y/o) indicates understanding of the above recommendations and agrees to the above plan. No orders of the defined types were placed in this encounter.       Meds & Refills for this Visit:  Requested Prescriptions      No prescriptions requested or ordered in this encounter       Imaging & Consults:  None    DIONNE Waters     Time spent during encounter: 15 minutes syncope

## 2023-12-02 ENCOUNTER — TELEMEDICINE (OUTPATIENT)
Dept: FAMILY MEDICINE CLINIC | Facility: CLINIC | Age: 58
End: 2023-12-02
Payer: COMMERCIAL

## 2023-12-02 DIAGNOSIS — H69.93 DYSFUNCTION OF BOTH EUSTACHIAN TUBES: Primary | ICD-10-CM

## 2023-12-02 PROBLEM — Z51.81 ENCOUNTER FOR THERAPEUTIC DRUG MONITORING: Status: RESOLVED | Noted: 2019-01-29 | Resolved: 2023-12-02

## 2023-12-02 PROCEDURE — 99213 OFFICE O/P EST LOW 20 MIN: CPT | Performed by: NURSE PRACTITIONER

## 2023-12-02 NOTE — PROGRESS NOTES
Due to the real risk of possible exposure to Coronavirus (CoV-2, COVID-19) in the office/medical building and recommendations for social distancing (key to mitigation/limiting the spread of the virus) a Virtual or Telemedicine visit over the phone was performed as below. Patient has consented to the Virtual/Telephone Check-In service and expresses understanding and accepts financial responsibility for any deductible, co-insurance and/or co-pays associated with this service. Telehealth outside of [address] Verbal Consent   I conducted a telehealth visit with Leana Rodriguez today, 12/02/23, which was completed using two-way, real-time interactive audio and video communication. This has been done in good anaid to provide continuity of care in the best interest of the provider-patient relationship, due to the COVID -19 public health crisis/national emergency where restrictions of face-to-face office visits are ongoing. Every conscious effort was taken to allow for sufficient and adequate time to complete the visit. The patient was made aware of the limitations of the telehealth visit, including treatment limitations as no physical exam could be performed. The patient was advised to call 911 or to go to the ER in case there was an emergency. The patient was also advised of the potential privacy & security concerns related to the telehealth platform. The patient was made aware of where to find [facility] notice of privacy practices, telehealth consent form and other related consent forms and documents. which are located on the [facility] website. The patient verbally agreed to telehealth consent form, related consents and the risks discussed. Lastly, the patient confirmed that they were in [place]. Included in this visit, time may have been spent reviewing labs, medications, radiology tests and decision making.  Appropriate medical decision-making and tests are ordered as detailed in the plan of care above.  Coding/billing information is submitted for this visit based on complexity of care and/or time spent for the visit. HPI:  Chief Complaint   Patient presents with    Follow - Up     Head and ear congestion, eye discharge       Rubio Oneil is a 63year old female who calls for complaints of:    Continued congestion/pressure in both ears. She was seen via telehealth on 11/17 and diagnosed with sinusitis, took Augmentin x 10 days. She was also treated for conjunctivitis and that has resolved. Reports her headaches and facial pressure are better. Nasal congestion is better-blowing her nose less. No fever/chills. No sore throat. Cough is dry, minimal. Has a lot of pressure in both ears, can hear them \"crackling\" when she blows her nose. Feels like they need to pop. Hearing is muffled. Energy level still not normal. She is not taking any medications for her symptoms now. ROS:  GEN: Denies fever, chills. +Fatigue. HEENT: See HPI. CARDIOVASCULAR: Denies chest pain, dyspnea. RESPIRATORY: Denies dyspnea. Mild dry cough. GI: Denies n/v/d/c, appetite normal.  NEURO: Denies headaches. Feels mildly dizzy at times, relates this to her ear pressure. Physical Exam:  GEN:  Patient is alert, awake and oriented, well developed, well nourished. LUNGS: Patient speaks clearly in full sentences without dyspnea, no cough while on video visit. PSYCH: Appropriate mood and affect. No Known Allergies  Current Outpatient Medications   Medication Sig Dispense Refill    ciprofloxacin 0.3 % Ophthalmic Solution Place 2 drops into the left eye every 4 (four) hours while awake. 10 mL 0    benzonatate 100 MG Oral Cap Take 1 capsule (100 mg total) by mouth 3 (three) times daily as needed.  30 capsule 0    hydroCHLOROthiazide 25 MG Oral Tab TAKE 1 TABLET DAILY 90 tablet 1    Phentermine HCl 37.5 MG Oral Tab Take 1 tablet (37.5 mg total) by mouth before breakfast. 30 tablet 3    celecoxib 100 MG Oral Cap Take 1 capsule (100 mg total) by mouth 2 (two) times daily as needed for Pain. 180 capsule 2     Past Medical History:   Diagnosis Date    Acne     Back pain     Dysmenorrhea     Hematuria     Palpitation     Rupture of Achilles tendon     Unspecified essential hypertension     UTI (lower urinary tract infection)      Past Surgical History:   Procedure Laterality Date          COLONOSCOPY N/A 2017    Procedure: COLONOSCOPY, POSSIBLE BIOPSY, POSSIBLE POLYPECTOMY 60875;  Surgeon: Basil Sparrow MD;  Location: [address] [place] [address]  - repeat 10 yrs    polyp; tics; hyperplastic    ENDOMETRIAL ABLATION      REPAIR ACHILLES TENDON,PRIMARY           ASSESSMENT AND PLAN:   1. Patient is a 63year old female who calls for: Ear pressure    Additional Assessment and Plan:  1. Dysfunction of both eustachian tubes  -Discussed pathophysiology of eustachian tube dysfunction. Recommended daily Claritin or Zyrtec OTC x 2 weeks, Flonase BID x 2 weeks. May use Mucinex PRN. Reviewed eustachian tube exercises and sent to patient via [email or web address]. No indication for further antibiotics at this time. If facial pressure/congestion worsens, she has a fever, has worsening ear pain she should be seen in-person. Follow up with me 1-2 weeks PRN in-person    Call and/or go to the ER if worsening symptoms including, but not limited to: respiratory distress, shortness of breath and  wheezing, worsening fever, cough and mental status. The patient (or patient's parent if <17 y/o) indicates understanding of the above recommendations and agrees to the above plan. No orders of the defined types were placed in this encounter.       Meds & Refills for this Visit:  Requested Prescriptions      No prescriptions requested or ordered in this encounter       Imaging & Consults:  None    DIONNE Crews     Time spent during encounter: 15 minutes

## 2023-12-02 NOTE — PATIENT INSTRUCTIONS
Eustachian tube exercises:  1. Yawn often. 2. Chewing motion with jaw 5-10 times per hour. 3. Blow up a balloon.   4. Plug your nose, close your mouth and blow out gently trying to \"pop\" your ears.  -Claritin or Zyrtec one tablet daily OTC (generic is OK) x 2 weeks  -Flonase nasal spray 1 spray in each nostril twice daily x 2 weeks (think \"nose to toes\")

## 2023-12-07 ENCOUNTER — PATIENT MESSAGE (OUTPATIENT)
Dept: FAMILY MEDICINE CLINIC | Facility: CLINIC | Age: 58
End: 2023-12-07

## 2023-12-09 DIAGNOSIS — M79.2 NEUROPATHIC PAIN: ICD-10-CM

## 2023-12-11 NOTE — TELEPHONE ENCOUNTER
Medication(s) to Refill:   Requested Prescriptions     Pending Prescriptions Disp Refills    CELECOXIB 100 MG Oral Cap [Pharmacy Med Name: Celecoxib 100 Mg Cap Nort] 180 capsule 0     Sig: Take 1 capsule by mouth two times daily as needed for Pain.         Reason for Medication Refill being sent to Provider / Reason Protocol Failed:  [] 90 day refill has already been granted  [] Blood Pressure out of range  [] Labs Abnormal/over due  [] Medication not previously prescribed by Provider  [x] Non-Protocol Medication  [] Controlled Substance   [] Due for appointment- no future appointment scheduled  [] No Follow up specified      Last Time Medication was Filled:  9/9/2023      Last Office Visit with PCP: 11/30/2022    When Patient was Due Back to the Office:  4 months   (from when PCP last addressed condition)    Future Appointments:  Future Appointments   Date Time Provider Department Center   1/11/2024 11:30 AM Susan Del Valle MD [facility] [place]         Last Blood Pressures:  BP Readings from Last 2 Encounters:   08/13/23 152/84   06/12/23 141/89       Action taken:  [] Refill approved per protocol  [x] Routing to provider for approval

## 2023-12-12 RX ORDER — CELECOXIB 100 MG/1
100 CAPSULE ORAL 2 TIMES DAILY PRN
Qty: 180 CAPSULE | Refills: 2 | Status: SHIPPED | OUTPATIENT
Start: 2023-12-12

## 2023-12-22 ENCOUNTER — TELEPHONE (OUTPATIENT)
Dept: FAMILY MEDICINE CLINIC | Facility: CLINIC | Age: 58
End: 2023-12-22

## 2023-12-22 DIAGNOSIS — Z00.00 ROUTINE GENERAL MEDICAL EXAMINATION AT A HEALTH CARE FACILITY: ICD-10-CM

## 2023-12-22 DIAGNOSIS — R73.01 IMPAIRED FASTING BLOOD SUGAR: Primary | ICD-10-CM

## 2023-12-27 DIAGNOSIS — I10 HYPERTENSION, UNSPECIFIED TYPE: ICD-10-CM

## 2023-12-28 RX ORDER — HYDROCHLOROTHIAZIDE 25 MG/1
TABLET ORAL
Qty: 90 TABLET | Refills: 1 | OUTPATIENT
Start: 2023-12-28

## 2023-12-28 NOTE — TELEPHONE ENCOUNTER
Patient is not due until Feb 2024. Last office visit 3/20/23 patient was asked to return for 4 months for a follow up. Patient is now overdue for a physical. Please contact patient and schedule a physical before her next refill.

## 2024-01-11 ENCOUNTER — OFFICE VISIT (OUTPATIENT)
Dept: FAMILY MEDICINE CLINIC | Facility: CLINIC | Age: 59
End: 2024-01-11
Payer: COMMERCIAL

## 2024-01-11 VITALS
WEIGHT: 212 LBS | DIASTOLIC BLOOD PRESSURE: 80 MMHG | HEART RATE: 70 BPM | OXYGEN SATURATION: 98 % | HEIGHT: 67 IN | RESPIRATION RATE: 15 BRPM | BODY MASS INDEX: 33.27 KG/M2 | SYSTOLIC BLOOD PRESSURE: 120 MMHG | TEMPERATURE: 98 F

## 2024-01-11 DIAGNOSIS — Z00.00 ANNUAL PHYSICAL EXAM: Primary | ICD-10-CM

## 2024-01-11 DIAGNOSIS — I10 HYPERTENSION, UNSPECIFIED TYPE: ICD-10-CM

## 2024-01-11 DIAGNOSIS — Z12.31 ENCOUNTER FOR SCREENING MAMMOGRAM FOR MALIGNANT NEOPLASM OF BREAST: ICD-10-CM

## 2024-01-11 DIAGNOSIS — E66.09 CLASS 1 OBESITY DUE TO EXCESS CALORIES WITH SERIOUS COMORBIDITY AND BODY MASS INDEX (BMI) OF 30.0 TO 30.9 IN ADULT: ICD-10-CM

## 2024-01-11 DIAGNOSIS — H69.93 DYSFUNCTION OF BOTH EUSTACHIAN TUBES: ICD-10-CM

## 2024-01-11 PROCEDURE — 99396 PREV VISIT EST AGE 40-64: CPT | Performed by: FAMILY MEDICINE

## 2024-01-11 PROCEDURE — 3074F SYST BP LT 130 MM HG: CPT | Performed by: FAMILY MEDICINE

## 2024-01-11 PROCEDURE — 3079F DIAST BP 80-89 MM HG: CPT | Performed by: FAMILY MEDICINE

## 2024-01-11 PROCEDURE — 3008F BODY MASS INDEX DOCD: CPT | Performed by: FAMILY MEDICINE

## 2024-01-11 RX ORDER — HYDROCHLOROTHIAZIDE 25 MG/1
TABLET ORAL
Qty: 90 TABLET | Refills: 1 | Status: SHIPPED | OUTPATIENT
Start: 2024-01-11

## 2024-01-11 RX ORDER — TIRZEPATIDE 2.5 MG/.5ML
2.5 INJECTION, SOLUTION SUBCUTANEOUS
Qty: 2 ML | Refills: 0 | Status: SHIPPED | OUTPATIENT
Start: 2024-01-11

## 2024-01-11 RX ORDER — PHENTERMINE HYDROCHLORIDE 37.5 MG/1
37.5 TABLET ORAL
Qty: 30 TABLET | Refills: 3 | Status: SHIPPED | OUTPATIENT
Start: 2024-01-11

## 2024-01-11 RX ORDER — NEOMYCIN SULFATE, POLYMYXIN B SULFATE AND HYDROCORTISONE 10; 3.5; 1 MG/ML; MG/ML; [USP'U]/ML
3 SUSPENSION/ DROPS AURICULAR (OTIC) 4 TIMES DAILY
Qty: 10 ML | Refills: 0 | Status: SHIPPED | OUTPATIENT
Start: 2024-01-11

## 2024-01-11 RX ORDER — MONTELUKAST SODIUM 10 MG/1
10 TABLET ORAL NIGHTLY
Qty: 90 TABLET | Refills: 1 | Status: SHIPPED | OUTPATIENT
Start: 2024-01-11

## 2024-01-11 NOTE — PROGRESS NOTES
Trupti Long is a 58 year old female.    CC:    Chief Complaint   Patient presents with    Well Adult    Follow - Up     23 televisit for ear pain, states she's still having ear pain and sinus issues        HPI:  Wellness     Exercise: Yes  Drinks water: Yes  Eat variety of fruits & vegetables, lean meats: Yes  Issues with sleep: No  Regular dental exams: Yes  Change in size/consistency of stool: No  Change in appearance of mole: No    Gyne Hx  OB History    Para Term  AB Living   2 2 2 0 0 2   SAB IAB Ectopic Multiple Live Births   0 0 0 0 2     No LMP recorded. Patient has had an ablation.       CAGE Alcohol Screening       2024    11:33 AM   CAGE Flowsheet   Have you ever felt you should Cut down on your drinking? 0   Have people Annoyed you by criticizing your drinking? 0   Have you ever felt bad or Guilty about your drinking? 0   Have you ever had a drink first thing in the morning to steady your nerves or to get rid of a hangover (Eye opener)? 0   Total Score: Item responses on the CAGE are scored 0 or 1, with a higher score an indication of alcohol 0   Total Score: Item responses on the CAGE are scored 0 or 1, with a higher score an indication of alcohol No Risk        Depression Screening (PHQ-2/PHQ-9): Over the LAST 2 WEEKS   Little interest or pleasure in doing things: Not at all    Feeling down, depressed, or hopeless: Not at all    PHQ-2 SCORE: 0          [place] Suicide Severity Rating Scale Screener   In what setting is the screener performed?: an office visit  1. Have you wished you were dead or wished you could go to sleep and not wake up? (past 30 days): No  2. Have you actually had any thoughts of killing yourself? (past 30 days): No  6. Have you ever done anything, started to do anything, or prepared to do anything to end your life? (lifetime): No  Score and Suggested Actions - Office Visit: No Risk  Score and Intervention  Score and Suggested Actions - Office  Visit: No Risk    Interval history:  Eustachian tube dysfunction   Started after cold   Also had pink eye   +noisy/crunching sound   Using flonase bid   Has been taking claritin daily   Sneezing in AM and sinus congestion   No fevers or chills   No hearing loss or dizziness         Follow up     Chronic pain   No change       HTN   Denies chest pain, SOB or unexplained HA's  BP Readings from Last 5 Encounters:   24 120/80   23 152/84   23 141/89   23 140/90   02/10/23 124/88         Allergies:  No Known Allergies   Current Meds:  Current Outpatient Medications on File Prior to Visit   Medication Sig Dispense Refill    celecoxib 100 MG Oral Cap Take 1 capsule by mouth two times daily as needed for Pain. 180 capsule 2     No current facility-administered medications on file prior to visit.        History:  Past Medical History:   Diagnosis Date    Acne     Allergic rhinitis 1 month    Anxiety Teens    Back pain     Dysmenorrhea     Hematuria     Obesity     Palpitation     Rupture of Achilles tendon     Unspecified essential hypertension     UTI (lower urinary tract infection)       Past Surgical History:   Procedure Laterality Date          COLONOSCOPY N/A 2017    Procedure: COLONOSCOPY, POSSIBLE BIOPSY, POSSIBLE POLYPECTOMY 85998;  Surgeon: Shravan Feliz MD;  Location: [facility]    COLONOSCOPY & POLYPECTOMY  - repeat 10 yrs    polyp; tics; hyperplastic    D & C      ENDOMETRIAL ABLATION      REPAIR ACHILLES TENDON,PRIMARY        Family History   Problem Relation Age of Onset    Other (Other) Father     Other (alchoholism) Father     Other (Other) Mother     Other (pulmonary embolism) Mother     Hypertension Mother     Breast Cancer Sister 55      Family Status   Relation Status    Fa     Mo     Sis (Not Specified)      Social History     Socioeconomic History    Marital status:    Tobacco Use    Smoking status:  Former     Packs/day: 1.00     Years: 20.00     Additional pack years: 0.00     Total pack years: 20.00     Types: Cigarettes     Quit date: 10/24/1996     Years since quittin.2    Smokeless tobacco: Never    Tobacco comments:     quit    Vaping Use    Vaping Use: Never used   Substance and Sexual Activity    Alcohol use: Yes     Alcohol/week: 6.0 standard drinks of alcohol     Types: 6 Glasses of wine per week     Comment: 1/2x per month     Drug use: No     Comment: CBD oil/bath    Sexual activity: Yes     Partners: Male   Other Topics Concern    Caffeine Concern Yes    Stress Concern Yes     Comment: Covid    Weight Concern Yes    Special Diet No    Exercise Yes    Seat Belt Yes   Social History Narrative    The patient does not use an assistive device..      The patient Does live in a home with stairs.          Immunization History   Administered Date(s) Administered    Covid-19 Vaccine Moderna 100 mcg/0.5 ml 2021, 2021    Influenza 10/22/2019    TDAP 2017       Wt Readings from Last 6 Encounters:   24 212 lb (96.2 kg)   23 210 lb (95.3 kg)   23 200 lb (90.7 kg)   23 209 lb (94.8 kg)   02/10/23 195 lb (88.5 kg)   22 212 lb (96.2 kg)       BP Readings from Last 3 Encounters:   24 120/80   23 152/84   23 141/89       REVIEW OF SYSTEMS:   Review of Systems   Constitutional:  Negative for chills, fever and malaise/fatigue.   HENT:  Positive for ear pain. Negative for congestion, hearing loss and sore throat.    Eyes:  Negative for blurred vision, double vision and pain.   Respiratory:  Negative for cough, shortness of breath and wheezing.    Cardiovascular:  Negative for chest pain, palpitations and leg swelling.   Gastrointestinal:  Negative for abdominal pain, blood in stool, constipation, diarrhea, melena, nausea and vomiting.   Genitourinary:  Negative for dysuria, flank pain and frequency.   Musculoskeletal:  Negative for back pain, joint  pain and myalgias.   Skin:  Negative for itching and rash.   Neurological:  Negative for dizziness, weakness and headaches.   Endo/Heme/Allergies:  Negative for environmental allergies and polydipsia. Does not bruise/bleed easily.   Psychiatric/Behavioral:  Negative for depression. The patient is not nervous/anxious and does not have insomnia.        EXAM:   /80   Pulse 70   Temp 98 °F (36.7 °C)   Resp 15   Ht 5' 7\" (1.702 m)   Wt 212 lb (96.2 kg)   SpO2 98%   BMI 33.20 kg/m²   Body mass index is 33.2 kg/m².  No LMP recorded. Patient has had an ablation.    Physical Exam  Constitutional:       General: She is not in acute distress.     Appearance: She is obese.   HENT:      Right Ear: Swelling present. Tympanic membrane is erythematous.      Left Ear: Swelling present. Tympanic membrane is erythematous.      Mouth/Throat:      Mouth: Mucous membranes are moist.      Pharynx: Oropharynx is clear. No posterior oropharyngeal erythema.   Eyes:      Extraocular Movements: Extraocular movements intact.      Conjunctiva/sclera: Conjunctivae normal.      Pupils: Pupils are equal, round, and reactive to light.   Cardiovascular:      Rate and Rhythm: Normal rate and regular rhythm.      Pulses: Normal pulses.      Heart sounds: Normal heart sounds.   Pulmonary:      Effort: Pulmonary effort is normal.      Breath sounds: Normal breath sounds. No wheezing or rhonchi.   Abdominal:      General: Abdomen is flat. Bowel sounds are normal.      Palpations: Abdomen is soft.      Tenderness: There is no abdominal tenderness. There is no guarding.   Musculoskeletal:         General: No swelling, tenderness, deformity or signs of injury.      Cervical back: Neck supple.   Lymphadenopathy:      Cervical: No cervical adenopathy.   Skin:     General: Skin is warm and dry.      Findings: No rash.   Neurological:      General: No focal deficit present.      Mental Status: She is alert and oriented to person, place, and time.       Motor: No weakness.   Psychiatric:         Mood and Affect: Mood normal.         Behavior: Behavior normal.         Thought Content: Thought content normal.                ASSESSMENT/PLAN:      1. Annual physical exam    2. Encounter for screening mammogram for malignant neoplasm of breast  - [facility] MEGHAN 2D+3D SCREENING BILAT (CPT=77067/12505); Future    3. Hypertension, unspecified type  - hydroCHLOROthiazide 25 MG Oral Tab; TAKE 1 TABLET DAILY  Dispense: 90 tablet; Refill: 1    4. Dysfunction of both eustachian tubes  - montelukast 10 MG Oral Tab; Take 1 tablet (10 mg total) by mouth nightly.  Dispense: 90 tablet; Refill: 1  - neomycin-polymyxin-hydrocortisone 3.5-26731-3 Otic Suspension; Place 3 drops into both ears 4 (four) times daily.  Dispense: 10 mL; Refill: 0    5. Class 1 obesity due to excess calories with serious comorbidity and body mass index (BMI) of 30.0 to 30.9 in adult  - Phentermine HCl 37.5 MG Oral Tab; Take 1 tablet (37.5 mg total) by mouth before breakfast.  Dispense: 30 tablet; Refill: 3  - Tirzepatide-Weight Management (ZEPBOUND) 2.5 MG/0.5ML Subcutaneous Solution Auto-injector; Inject 2.5 mg into the skin every 7 days.  Dispense: 2 mL; Refill: 0           Health Maintenance   Topic Date Due    Zoster Vaccines (1 of 2) Never done    Annual Physical  11/30/2023    Mammogram  01/11/2024    Influenza Vaccine (1) 01/11/2025 (Originally 10/1/2023)    COVID-19 Vaccine (3 - 2023-24 season) 01/11/2025 (Originally 9/1/2023)    Pap Smear  11/30/2025    DTaP,Tdap,and Td Vaccines (2 - Td or Tdap) 03/13/2027    Colorectal Cancer Screening  07/11/2027    Annual Depression Screening  Completed    Pneumococcal Vaccine: Birth to 64yrs  Aged Out         Healthy diet and regular exercise discussed     Meds & Refills for this Visit:  Requested Prescriptions     Signed Prescriptions Disp Refills    hydroCHLOROthiazide 25 MG Oral Tab 90 tablet 1     Sig: TAKE 1 TABLET DAILY    Phentermine HCl 37.5 MG Oral Tab 30  tablet 3     Sig: Take 1 tablet (37.5 mg total) by mouth before breakfast.    Tirzepatide-Weight Management (ZEPBOUND) 2.5 MG/0.5ML Subcutaneous Solution Auto-injector 2 mL 0     Sig: Inject 2.5 mg into the skin every 7 days.    montelukast 10 MG Oral Tab 90 tablet 1     Sig: Take 1 tablet (10 mg total) by mouth nightly.    neomycin-polymyxin-hydrocortisone 3.5-12340-8 Otic Suspension 10 mL 0     Sig: Place 3 drops into both ears 4 (four) times daily.         Imaging & Consults:  [facility] MEGHAN 2D+3D SCREENING BILAT (CPT=77067/23799)    Return in about 3 months (around 4/11/2024).

## 2024-02-12 DIAGNOSIS — I10 HYPERTENSION, UNSPECIFIED TYPE: ICD-10-CM

## 2024-02-12 NOTE — TELEPHONE ENCOUNTER
Medication(s) to Refill:   Requested Prescriptions     Pending Prescriptions Disp Refills    hydroCHLOROthiazide 25 MG Oral Tab 90 tablet 1     Sig: TAKE 1 TABLET DAILY         Reason for Medication Refill being sent to Provider / Reason Protocol Failed:  [] 90 day refill has already been granted  [] Blood Pressure out of range  [] Labs Abnormal/over due  [] Medication not previously prescribed by Provider  [] Non-Protocol Medication  [] Controlled Substance   [] Due for appointment- no future appointment scheduled  [] No Follow up specified      Last Time Medication was Filled:  1/11/24      Last Office Visit with PCP: 1/11/24    When Patient was Due Back to the Office:  3 months  (from when PCP last addressed condition)    Future Appointments:  No future appointments.      Last Blood Pressures:  BP Readings from Last 2 Encounters:   01/11/24 120/80   08/13/23 152/84           Action taken:  [] Refill approved per protocol  [] Routing to provider for approval

## 2024-02-14 RX ORDER — HYDROCHLOROTHIAZIDE 25 MG/1
TABLET ORAL
Qty: 90 TABLET | Refills: 1 | Status: SHIPPED | OUTPATIENT
Start: 2024-02-14

## 2024-03-21 NOTE — TELEPHONE ENCOUNTER
Medication(s) to Refill:   Requested Prescriptions     Pending Prescriptions Disp Refills    hydroCHLOROthiazide 25 MG Oral Tab 90 tablet 1     Sig: TAKE 1 TABLET DAILY    Phentermine HCl 37.5 MG Oral Tab 30 tablet 3     Sig: Take 1 tablet (37.5 mg total) by mouth before breakfast.         Reason for Medication Refill being sent to Provider / Reason Protocol Failed:  [] 90 day refill has already been granted  [] Blood Pressure out of range  [] Labs Abnormal/over due  [] Medication not previously prescribed by Provider  [] Non-Protocol Medication  [] Controlled Substance   [] Due for appointment- no future appointment scheduled  [] No Follow up specified      Last Time Medication was Filled:  4/26/23      Last Office Visit with PCP: 3/20/23    When Patient was Due Back to the Office:  4 months  (from when PCP last addressed condition)    Future Appointments:  No future appointments.       Last Blood Pressures:  BP Readings from Last 2 Encounters:  08/13/23 : 152/84  06/12/23 : 141/89      Action taken:  [] Refill approved per protocol  [] Routing to provider for approval Unable to assess due to medical condition

## 2024-04-23 NOTE — LETTER
9/24/2019          To Whom It May Concern:    Kyleigh Beth is currently under my medical care. She is taking a medication called Phentermine that we prescribe. If you require additional information please contact our office.         Sincerely,
Treatment Number (Will Not Render If 0): 0
Render Post-Care Instructions In Note?: no
Include Z78.9 (Other Specified Conditions Influencing Health Status) As An Associated Diagnosis?: Yes
Anesthesia Volume In Cc: 0.2
Post-Care Instructions: I reviewed with the patient in detail post-care instructions. Patient is to wear sunprotection, and avoid picking at any of the treated lesions. Pt may apply Vaseline to crusted or scabbing areas.
Medical Necessity Clause: This procedure was medically necessary because the lesions that were treated were: irritated
Medical Necessity Information: It is in your best interest to select a reason for this procedure from the list below. All of these items fulfill various CMS LCD requirements except the new and changing color options.
Detail Level: Simple
Consent: The patient's consent was obtained including but not limited to risks of crusting, scabbing, blistering, scarring, darker or lighter pigmentary change, recurrence, incomplete removal and infection.

## 2024-05-19 ENCOUNTER — PATIENT MESSAGE (OUTPATIENT)
Dept: FAMILY MEDICINE CLINIC | Facility: CLINIC | Age: 59
End: 2024-05-19

## 2024-05-20 NOTE — TELEPHONE ENCOUNTER
From: Trupti Long  To: ZEE LEW  Sent: 5/19/2024 7:38 AM CDT  Subject: Foot note for employer    Good morning-    In the past you have written a note for my employer to be allowed to wear sneaker type shoes at work for my chronic foot issues. I am unable to find the letter in my chart. Can you please resend at your convenience? I need to show it to my supervisors again..   There have been no changes in the condition and some days the pain is worsening. I need to make an appointment soon.    Thank you

## 2024-05-20 NOTE — TELEPHONE ENCOUNTER
Chief Complaint   Patient presents with    Hypertension     1. Have you been to the ER, urgent care clinic since your last visit? Hospitalized since your last visit? No    2. Have you seen or consulted any other health care providers outside of the [address] [place] [address] since your last visit? Include any pap smears or colon screening.  No Pt requesting letter so she can wear sneaker type shoes at work for chronic foot issues. Okay for note?   LOV: 01/11/24

## 2024-07-10 DIAGNOSIS — E66.09 CLASS 1 OBESITY DUE TO EXCESS CALORIES WITH SERIOUS COMORBIDITY AND BODY MASS INDEX (BMI) OF 30.0 TO 30.9 IN ADULT: ICD-10-CM

## 2024-07-10 RX ORDER — PHENTERMINE HYDROCHLORIDE 37.5 MG/1
37.5 TABLET ORAL
Qty: 30 TABLET | Refills: 0 | Status: SHIPPED | OUTPATIENT
Start: 2024-07-10

## 2024-07-30 NOTE — PATIENT INSTRUCTIONS
HPI   Chief Complaint   Patient presents with    Eye Problem     Pt. Went to eye  Today for blurred vision that started last night.  Sent here from eye   Stating he wanted his carotids checked.  Started at 10 pm last night.       History provided by: Patient    Limitations to history: None    CC: Vision changes    HPI: 74-year-old male with a history of COPD, obstructive sleep apnea, atrial fibrillation, hypertension, acid reflux, hyperlipidemia presents the emergency department to be evaluated for right eye vision changes.  Patient states around 10 PM last night he started having blurred and double vision in his right eye.  He states that both the blurred vision and double vision and intermittent.  He localizes the blurred vision to the bottom half of his eye.  Denies any eye injury, eye redness, tearing or discharge.  Denies use of contact lenses.  Denies headache, neck pain, or pain over his temple.  He has not had symptoms like this before.  He follow-up with his ophthalmologist today we did a thorough examination.  Funduscopic exam showed that his optic discs were slightly enlarged.  States that his intraocular pressures were within normal limits.  Otherwise his examination with ophthalmologist was unremarkable.  They recommend that he present to the emergency department for further evaluation including ruling out any carotid pathology.  He denies slurred speech or facial drooping.  Denies numbness tingling or weakness in any of his extremities.  Denies lightheadedness and dizziness.  Denies history of TIA or stroke.  Denies chest pain or difficulty breathing.  Denies tearing or shredding pain in his chest or back.  Denies pleuritic pain or hemoptysis.  Denies nausea vomiting.  Denies weakness and fatigue.  Denies all other systemic symptoms.    ROS: Negative unless mentioned in HPI    Social Hx: Former smoker.  Denies alcohol or drug use.    Medical Hx: Denies allergies.  Immunizations  Patient Instructions   Keep up the great work! !     PLAN:  Continue with medications: phentermine 15mg  Follow up with me in 1 month  Schedule follow up appointments:  Dietitian/nutritionist      Please try to work on the following dietary changes:  1.  Dri sleep, and amena to your daily life. up-to-date.    Physical exam:    Constitutional: Patient is well-nourished and well-developed.  Sitting comfortably in the room and in no distress.  Oriented to person, place, time, and situation.    HEENT: Head is normocephalic, atraumatic. Patient's airway is patent.  Tympanic membranes are clear bilaterally.  Nasal mucosa clear.  Mouth with normal mucosa.  Throat is not erythematous and there are no oropharyngeal exudates, uvula is midline.  No obvious facial deformities.    Eyes: Clear bilaterally.  Pupils are equal round and reactive to light and accommodation.  Extraocular movements intact.  Extraocular movementselicit no discomfort.  Visual field is intact.  Patient's visual acuity in his bilateral eyes is 2010.  Acuity in the left eye is 20/15 and acuity in the right eye is 20/25.  No tenderness over the temple    Cardiac: Regular rate, regular rhythm.  Heart sounds S1, S2.  No murmurs, rubs, or gallops.  PMI nondisplaced.  No JVD.    Respiratory: Regular respiratory rate and effort.  Breath sounds are clear and equal bilaterally, no adventitious lung sounds.  Patient is speaking in full sentences and is in no apparent respiratory distress. No use of accessory muscles.      Gastrointestinal: Abdomen is soft, nondistended, and nontender.  There are no obvious deformities.  No rebound tenderness or guarding.  Bowel sounds are normal active.    Genitourinary: No CVA or flank tenderness.    Musculoskeletal: No reproducible tenderness.  No obvious skin or bony deformities.  Patient has equal range of motion in all extremities and no strength deficiencies.  No muscle or joint tenderness. No back or neck tenderness.  Capillary refill less than 3 seconds.  Strong peripheral pulses.  No sensory deficits.    Neurological: Patient is alert and oriented.  No focal deficits.  5/5 strength in all extremities.  Cranial nerves II through XII intact. GCS15.  No slurred speech or facial drooping.  Upper and lower extremity  coordination intact.  No neurological deficits.  NIH is 0.    Skin: Skin is normal color for race and is warm, dry, and intact.  No evidence of trauma.  No lesions, rashes, bruising, jaundice, or masses.    Psych: Appropriate mood and affect.  No apparent risk to self or others.    Heme/lymph: No adenopathy, lymphadenopathy, or splenomegaly    Physical exam is otherwise negative unless stated above or in history of present illness.              Patient History   Past Medical History:   Diagnosis Date    COPD (chronic obstructive pulmonary disease) (Multi)     Obstructive sleep apnea (adult) (pediatric)     GIRISH (obstructive sleep apnea)    Personal history of other diseases of the circulatory system     History of hypertension    Personal history of other diseases of the digestive system     History of gastroesophageal reflux (GERD)    Personal history of other diseases of the nervous system and sense organs     History of peripheral neuropathy    Personal history of other endocrine, nutritional and metabolic disease     History of hyperlipidemia     Past Surgical History:   Procedure Laterality Date    BACK SURGERY  2014    Back Surgery    CHOLECYSTECTOMY  2014    Cholecystectomy    COLONOSCOPY  2014    Complete Colonoscopy    OTHER SURGICAL HISTORY  2014    Biopsy Muscle    OTHER SURGICAL HISTORY  2021    Eye surgery    VASECTOMY  2014    Surgery Vas Deferens Vasectomy     No family history on file.  Social History     Tobacco Use    Smoking status: Former     Current packs/day: 0.00     Types: Cigarettes     Quit date: 1976     Years since quittin.1    Smokeless tobacco: Never   Substance Use Topics    Alcohol use: Never    Drug use: Never       Physical Exam   ED Triage Vitals [24 1637]   Temperature Heart Rate Respirations BP   36.7 °C (98.1 °F) 83 20 143/85      Pulse Ox Temp Source Heart Rate Source Patient Position   96 % Temporal Monitor Sitting      BP  Location FiO2 (%)     Right arm --       Physical Exam      ED Course & MDM   Diagnoses as of 07/30/24 1935   Vision changes          Patient updated on plan for lab testing, IV insertion, radiology imaging, and medications to be administered while in the ER (if indicated). Patient updated on expected wait times for testing and results. Patient provided my name and told to ask any staff member for questions or concerns if they should arise. Electronic medical record reviewed.     MDM    Upon initial assessment, patient was healthy non-toxic appearing and in no apparent distress.     Patient presented to the emergency department with the chief complaint right eye intermittent blurred vision and double vision. Clear bilaterally.  Pupils are equal round and reactive to light and accommodation.  Extraocular movements intact.  Extraocular movementselicit no discomfort.  Visual field is intact.  Patient's visual acuity in his bilateral eyes is 2010.  Acuity in the left eye is 20/15 and acuity in the right eye is 20/25.Patient is alert and oriented.  No focal deficits.  5/5 strength in all extremities.  Cranial nerves II through XII intact. GCS15.  No slurred speech or facial drooping.  Upper and lower extremity coordination intact.  No neurological deficits.  NIH is 0.   No tenderness over the templeon arrival to the emergency department, vital signs were within normal limits    At this time patient is not a candidate for TNK or acute stroke intervention at this time given his history and physical exam and NIH score.  Will obtain basic blood work, EKG, coagulation screen, and a CTA of the head and neck    EKG was performed at 1806 and interpreted by me.  It showed A-fib rate controlled at 74 beats minute.  No ST elevation or depression.  No prolonged QT.    Patient's CMP shows hyperglycemia 126 today.  Leukocytosis 11.5 collection neutrophil count 8.96  Patient CBC.  Inflammatory markers are mildly elevated with a CRP of  1.07 and ESR of 36.  CT reveals no acute abnormalities.  Given the patient's intermittent double vision blurred vision, there is concern that the patient is experiencing a transient ischemic attack.  I do believe the patient warrants further evaluation especially given that his ophthalmologist did not see any acute abnormalities on their and other than the mild nonspecific enlargement of the optic disc.  I spoke to the hospitalist was agreeable to admit the patient for further evaluation including an MRI and neurology consult.    Will order the patient aspirin.  Patient verbalized understanding and agreement to treatment plan and he remained hemodynamically stable in the ER.    This note was dictated using a speech recognition program.  While an attempt was made at proof-reading to minimize errors, minor errors in transcription may be present             [place] Coma Scale Score: 15                        Medical Decision Making      Procedure  Procedures     Jovan Hanks PA-C  07/30/24 1937

## 2024-08-12 ENCOUNTER — OFFICE VISIT (OUTPATIENT)
Dept: FAMILY MEDICINE CLINIC | Facility: CLINIC | Age: 59
End: 2024-08-12
Payer: COMMERCIAL

## 2024-08-12 VITALS
RESPIRATION RATE: 16 BRPM | DIASTOLIC BLOOD PRESSURE: 86 MMHG | HEIGHT: 67 IN | BODY MASS INDEX: 32.18 KG/M2 | HEART RATE: 97 BPM | OXYGEN SATURATION: 98 % | TEMPERATURE: 98 F | SYSTOLIC BLOOD PRESSURE: 124 MMHG | WEIGHT: 205 LBS

## 2024-08-12 DIAGNOSIS — U07.1 POSITIVE SELF-ADMINISTERED ANTIGEN TEST FOR COVID-19: Primary | ICD-10-CM

## 2024-08-12 DIAGNOSIS — U07.1 COVID: ICD-10-CM

## 2024-08-12 LAB
OPERATOR ID: ABNORMAL
POCT LOT NUMBER: ABNORMAL
RAPID SARS-COV-2 BY PCR: DETECTED

## 2024-08-12 NOTE — PROGRESS NOTES
CHIEF COMPLAINT:     Chief Complaint   Patient presents with    Covid     moderate worsening Cold symptoms for 2 days, body aches severe, took a home test that came up positive for Covid. - Entered by patient       HPI:   Trupti Long is a 59 year old female who presents for upper respiratory symptoms for  3 days. Patient reports congestion, cough, body aches, feverish, headache . Symptoms have been worsneing since onset.  Treating symptoms with advil and tylenol. Pt had at home pos covid test.      Current Outpatient Medications   Medication Sig Dispense Refill    nirmatrelvir-ritonavir 300-100 MG Oral Tablet Therapy Pack Take two nirmatrelvir tablets (300mg) with one ritonavir tablet (100mg) together twice daily for 5 days. 30 tablet 0    Phentermine HCl 37.5 MG Oral Tab Take 1 tablet (37.5 mg total) by mouth before breakfast. 30 tablet 0    hydroCHLOROthiazide 25 MG Oral Tab TAKE 1 TABLET DAILY 90 tablet 1    Tirzepatide-Weight Management (ZEPBOUND) 2.5 MG/0.5ML Subcutaneous Solution Auto-injector Inject 2.5 mg into the skin every 7 days. 2 mL 0    montelukast 10 MG Oral Tab Take 1 tablet (10 mg total) by mouth nightly. 90 tablet 1    neomycin-polymyxin-hydrocortisone 3.5-14528-1 Otic Suspension Place 3 drops into both ears 4 (four) times daily. 10 mL 0    celecoxib 100 MG Oral Cap Take 1 capsule by mouth two times daily as needed for Pain. 180 capsule 2      Past Medical History:    Acne    Allergic rhinitis    Anxiety    Back pain    Dysmenorrhea    Hematuria    Obesity    Palpitation    Rupture of Achilles tendon    Unspecified essential hypertension    UTI (lower urinary tract infection)      Past Surgical History:   Procedure Laterality Date          Colonoscopy N/A 2017    Procedure: COLONOSCOPY, POSSIBLE BIOPSY, POSSIBLE POLYPECTOMY 54298;  Surgeon: Shravan Feliz MD;  Location: [facility] [place] [facility]    Colonoscopy & polypectomy  - repeat 10 yrs    polyp; tics;  hyperplastic    D & c  2015    Endometrial ablation  2015    Repair achilles tendon,primary           Social History     Socioeconomic History    Marital status:    Tobacco Use    Smoking status: Former     Current packs/day: 0.00     Average packs/day: 1 pack/day for 20.0 years (20.0 ttl pk-yrs)     Types: Cigarettes     Start date: 10/24/1976     Quit date: 10/24/1996     Years since quittin.8    Smokeless tobacco: Never    Tobacco comments:     quit    Vaping Use    Vaping status: Never Used   Substance and Sexual Activity    Alcohol use: Yes     Alcohol/week: 6.0 standard drinks of alcohol     Types: 6 Glasses of wine per week     Comment: 1/2x per month     Drug use: No     Comment: CBD oil/bath    Sexual activity: Yes     Partners: Male   Other Topics Concern    Caffeine Concern Yes    Stress Concern Yes     Comment: Covid    Weight Concern Yes    Special Diet No    Exercise Yes    Seat Belt Yes   Social History Narrative    The patient does not use an assistive device..      The patient Does live in a home with stairs.         REVIEW OF SYSTEMS:   GENERAL: decreased appetite  SKIN: no rashes or abnormal skin lesions  HEENT: See HPI  LUNGS: See HPI  CARDIOVASCULAR: denies chest pain or palpitations   GI: denies N/V/C or abdominal pain      EXAM:   /86   Pulse 97   Temp 98 °F (36.7 °C)   Resp 16   Ht 5' 7\" (1.702 m)   Wt 205 lb (93 kg)   SpO2 98%   BMI 32.11 kg/m²   GENERAL: well developed, well nourished,in no apparent distress  SKIN: no rashes,no suspicious lesions  HEAD: atraumatic, normocephalic.  no tenderness on palpation of  sinuses  EYES: conjunctiva clear, EOM intact  EARS: TM's pearly, no bulging, no retraction,no fluid, bony landmarks visible  NOSE: Nostrils patent, clear nasal discharge, nasal mucosa red and inflamed   THROAT: Oral mucosa pink, moist. Posterior pharynx is not erythematous. no exudates. Tonsils 1/4.    NECK: Supple, non-tender  LUNGS: clear to auscultation  bilaterally, no wheezes or rhonchi. Breathing is non labored.  CARDIO: RRR without murmur  EXTREMITIES: no cyanosis, clubbing or edema  LYMPH:  no lymphadenopathy.        ASSESSMENT AND PLAN:   Trupti Long is a 59 year old female who presents with upper respiratory symptoms that are consistent with    ASSESSMENT:   Encounter Diagnoses   Name Primary?    Positive self-administered antigen test for COVID-19 Yes    COVID        PLAN: Meds as below.  Comfort care as described in Patient Instructions  Educated on viral vs bacterial  Pt wanted paxlovid   Educated on supportive measures: ibuprofen/tylenol, hydration, delsym for cough if needed  Educated on s/s of worsening sx and when to seek higher level of care  Follow up with pcp if not improving      Meds & Refills for this Visit:  Requested Prescriptions     Signed Prescriptions Disp Refills    nirmatrelvir-ritonavir 300-100 MG Oral Tablet Therapy Pack 30 tablet 0     Sig: Take two nirmatrelvir tablets (300mg) with one ritonavir tablet (100mg) together twice daily for 5 days.     Risks, benefits, and side effects of medication explained and discussed.    The patient indicates understanding of these issues and agrees to the plan.  The patient is asked to f/u with PCP if sx's persist or worsen.  There are no Patient Instructions on file for this visit.

## 2024-08-15 ENCOUNTER — PATIENT MESSAGE (OUTPATIENT)
Dept: FAMILY MEDICINE CLINIC | Facility: CLINIC | Age: 59
End: 2024-08-15

## 2024-08-16 NOTE — TELEPHONE ENCOUNTER
From: Trupti Long  To: ZEE LEW  Sent: 8/15/2024 6:42 PM CDT  Subject: trying to schedule a mammogram    I cannot seem to find a phone # to schedule a mammogram, or schedule online.

## 2024-08-19 ENCOUNTER — OFFICE VISIT (OUTPATIENT)
Dept: FAMILY MEDICINE CLINIC | Facility: CLINIC | Age: 59
End: 2024-08-19
Payer: COMMERCIAL

## 2024-08-19 VITALS
BODY MASS INDEX: 32.49 KG/M2 | RESPIRATION RATE: 18 BRPM | OXYGEN SATURATION: 97 % | SYSTOLIC BLOOD PRESSURE: 120 MMHG | HEIGHT: 67 IN | WEIGHT: 207 LBS | DIASTOLIC BLOOD PRESSURE: 80 MMHG | HEART RATE: 82 BPM

## 2024-08-19 DIAGNOSIS — R23.4 BREAST SKIN CHANGES: Primary | ICD-10-CM

## 2024-08-19 DIAGNOSIS — E66.09 CLASS 1 OBESITY DUE TO EXCESS CALORIES WITH SERIOUS COMORBIDITY AND BODY MASS INDEX (BMI) OF 30.0 TO 30.9 IN ADULT: ICD-10-CM

## 2024-08-19 DIAGNOSIS — I10 HYPERTENSION, UNSPECIFIED TYPE: ICD-10-CM

## 2024-08-19 PROCEDURE — 3074F SYST BP LT 130 MM HG: CPT | Performed by: FAMILY MEDICINE

## 2024-08-19 PROCEDURE — 3008F BODY MASS INDEX DOCD: CPT | Performed by: FAMILY MEDICINE

## 2024-08-19 PROCEDURE — 3079F DIAST BP 80-89 MM HG: CPT | Performed by: FAMILY MEDICINE

## 2024-08-19 PROCEDURE — 99214 OFFICE O/P EST MOD 30 MIN: CPT | Performed by: FAMILY MEDICINE

## 2024-08-19 RX ORDER — HYDROCHLOROTHIAZIDE 25 MG/1
TABLET ORAL
Qty: 90 TABLET | Refills: 1 | Status: SHIPPED | OUTPATIENT
Start: 2024-08-19

## 2024-08-19 RX ORDER — PHENTERMINE HYDROCHLORIDE 37.5 MG/1
37.5 TABLET ORAL
Qty: 30 TABLET | Refills: 3 | Status: SHIPPED | OUTPATIENT
Start: 2024-08-19

## 2024-08-19 NOTE — PROGRESS NOTES
Subjective:   Trupti Long is a 59 year old female who presents for Medication Request and Bump (Right breast, 1 week, red no pain )     Rash breast   Noticed redness of inner lower quadrant of right breast   Denies any lump or pain.   She does not recall any bug bite.   It ws very red and then lightened a bit over week, but tracking line of erythema extending down - patient states it looked like a \"comma\"    Weight   Has been taking phentermine - feels it helps to control appetite   But still would like to lose additional weight   Denies any SE     Wt Readings from Last 6 Encounters:   24 207 lb (93.9 kg)   24 205 lb (93 kg)   24 212 lb (96.2 kg)   23 210 lb (95.3 kg)   23 200 lb (90.7 kg)   23 209 lb (94.8 kg)       Recent COVID infection   No active sx   States she had congestion, headaches       Leg pain   Saw podiatry/ortho    Nerve study completed and diagnosed with neuropathy   +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       History/Other:    Chief Complaint Reviewed and Verified  Nursing Notes Reviewed and   Verified  Tobacco Reviewed  Allergies Reviewed  Medications Reviewed    Problem List Reviewed  Medical History Reviewed  Surgical History   Reviewed  Family History Reviewed         Tobacco:  She smoked tobacco in the past but quit greater than 12 months ago.  Social History     Tobacco Use   Smoking Status Former    Current packs/day: 0.00    Average packs/day: 1 pack/day for 20.0 years (20.0 ttl pk-yrs)    Types: Cigarettes    Start date: 10/24/1976    Quit date: 10/24/1996    Years since quittin.8   Smokeless Tobacco Never   Tobacco Comments    quit         Current Outpatient Medications   Medication Sig Dispense Refill    hydroCHLOROthiazide 25 MG Oral Tab TAKE 1 TABLET DAILY 90 tablet 1    Phentermine HCl 37.5 MG Oral  Tab Take 1 tablet (37.5 mg total) by mouth before breakfast. 30 tablet 3    celecoxib 100 MG Oral Cap Take 1 capsule by mouth two times daily as needed for Pain. 180 capsule 2    Tirzepatide-Weight Management (ZEPBOUND) 2.5 MG/0.5ML Subcutaneous Solution Auto-injector Inject 2.5 mg into the skin every 7 days. (Patient not taking: Reported on 8/19/2024) 2 mL 0         Review of Systems:  Review of Systems   Constitutional:  Negative for chills and fever.   HENT:  Negative for rhinorrhea and sinus pressure.    Eyes:  Negative for pain and visual disturbance.   Respiratory:  Negative for cough and shortness of breath.    Cardiovascular:  Negative for chest pain and palpitations.   Gastrointestinal:  Negative for abdominal pain, nausea and vomiting.   Genitourinary:  Negative for dysuria, frequency and urgency.   Musculoskeletal:  Positive for myalgias. Negative for arthralgias and joint swelling.   Skin:  Negative for pallor and rash.   Neurological:  Positive for numbness. Negative for dizziness and headaches.         Objective:   /80   Pulse 82   Resp 18   Ht 5' 7\" (1.702 m)   Wt 207 lb (93.9 kg)   SpO2 97%   BMI 32.42 kg/m²  Estimated body mass index is 32.42 kg/m² as calculated from the following:    Height as of this encounter: 5' 7\" (1.702 m).    Weight as of this encounter: 207 lb (93.9 kg).  Physical Exam  Constitutional:       Appearance: She is obese. She is not toxic-appearing or diaphoretic.   Cardiovascular:      Rate and Rhythm: Normal rate and regular rhythm.   Pulmonary:      Effort: Pulmonary effort is normal.      Breath sounds: Normal breath sounds.   Chest:   Breasts:     Right: Inverted nipple and skin change present. No mass, nipple discharge or tenderness.      Left: Inverted nipple present. No mass, nipple discharge, skin change or tenderness.          Comments: Erythema, with tracking inferiorly. No palpable mass or lesion, nontener  Abdominal:      General: Abdomen is flat. Bowel  sounds are normal.      Palpations: Abdomen is soft.   Neurological:      Mental Status: She is alert.           Assessment & Plan:   1. Breast skin changes (Primary)  -     [facility] MEGHAN 2D+3D DIAGNOSTIC [facility]  BILAT (CPT=77066/45474); Future; Expected date: 09/07/2024  2. Hypertension, unspecified type  -     hydroCHLOROthiazide; TAKE 1 TABLET DAILY  Dispense: 90 tablet; Refill: 1  3. Class 1 obesity due to excess calories with serious comorbidity and body mass index (BMI) of 30.0 to 30.9 in adult  -     Phentermine HCl; Take 1 tablet (37.5 mg total) by mouth before breakfast.  Dispense: 30 tablet; Refill: 3        Return in about 3 months (around 11/19/2024).    ZEE LEW MD, 8/19/2024, 3:10 PM

## 2024-10-03 ENCOUNTER — HOSPITAL ENCOUNTER (OUTPATIENT)
Dept: MAMMOGRAPHY | Facility: HOSPITAL | Age: 59
Discharge: HOME | End: 2024-10-03
Attending: FAMILY MEDICINE
Payer: COMMERCIAL

## 2024-10-03 DIAGNOSIS — R23.4 BREAST SKIN CHANGES: ICD-10-CM

## 2024-10-03 PROCEDURE — 76642 ULTRASOUND BREAST LIMITED: CPT | Performed by: FAMILY MEDICINE

## 2024-10-03 PROCEDURE — 77066 DX MAMMO INCL CAD BI: CPT | Performed by: FAMILY MEDICINE

## 2024-10-03 PROCEDURE — 77062 BREAST TOMOSYNTHESIS BI: CPT | Performed by: FAMILY MEDICINE

## 2024-10-22 ENCOUNTER — TELEMEDICINE (OUTPATIENT)
Dept: FAMILY MEDICINE CLINIC | Facility: CLINIC | Age: 59
End: 2024-10-22
Payer: COMMERCIAL

## 2024-10-22 DIAGNOSIS — R19.7 DIARRHEA OF PRESUMED INFECTIOUS ORIGIN: ICD-10-CM

## 2024-10-22 DIAGNOSIS — R42 DIZZINESS: Primary | ICD-10-CM

## 2024-10-22 PROCEDURE — 99213 OFFICE O/P EST LOW 20 MIN: CPT | Performed by: STUDENT IN AN ORGANIZED HEALTH CARE EDUCATION/TRAINING PROGRAM

## 2024-10-23 ENCOUNTER — LAB ENCOUNTER (OUTPATIENT)
Dept: LAB | Age: 59
End: 2024-10-23
Attending: STUDENT IN AN ORGANIZED HEALTH CARE EDUCATION/TRAINING PROGRAM
Payer: COMMERCIAL

## 2024-10-23 ENCOUNTER — HOSPITAL ENCOUNTER (EMERGENCY)
Age: 59
Discharge: STILL ADMITTED OR ADMINISTRATIVE | End: 2024-10-23
Payer: COMMERCIAL

## 2024-10-23 DIAGNOSIS — Z00.00 ROUTINE GENERAL MEDICAL EXAMINATION AT A HEALTH CARE FACILITY: ICD-10-CM

## 2024-10-23 DIAGNOSIS — R19.7 DIARRHEA OF PRESUMED INFECTIOUS ORIGIN: ICD-10-CM

## 2024-10-23 DIAGNOSIS — R73.01 IMPAIRED FASTING BLOOD SUGAR: ICD-10-CM

## 2024-10-23 DIAGNOSIS — R42 DIZZINESS: ICD-10-CM

## 2024-10-23 LAB
ALBUMIN SERPL-MCNC: 4.6 G/DL (ref 3.2–4.8)
ALBUMIN/GLOB SERPL: 1.8 {RATIO} (ref 1–2)
ALP LIVER SERPL-CCNC: 95 U/L
ALT SERPL-CCNC: 17 U/L
ANION GAP SERPL CALC-SCNC: 5 MMOL/L (ref 0–18)
ANION GAP SERPL CALC-SCNC: 7 MMOL/L (ref 0–18)
AST SERPL-CCNC: 21 U/L (ref ?–34)
BASOPHILS # BLD AUTO: 0.02 X10(3) UL (ref 0–0.2)
BASOPHILS NFR BLD AUTO: 0.4 %
BILIRUB SERPL-MCNC: 0.6 MG/DL (ref 0.3–1.2)
BUN BLD-MCNC: 8 MG/DL (ref 9–23)
BUN BLD-MCNC: 8 MG/DL (ref 9–23)
CALCIUM BLD-MCNC: 9.5 MG/DL (ref 8.7–10.4)
CALCIUM BLD-MCNC: 9.5 MG/DL (ref 8.7–10.4)
CHLORIDE SERPL-SCNC: 106 MMOL/L (ref 98–112)
CHLORIDE SERPL-SCNC: 106 MMOL/L (ref 98–112)
CHOLEST SERPL-MCNC: 202 MG/DL (ref ?–200)
CO2 SERPL-SCNC: 26 MMOL/L (ref 21–32)
CO2 SERPL-SCNC: 26 MMOL/L (ref 21–32)
CREAT BLD-MCNC: 0.86 MG/DL
CREAT BLD-MCNC: 0.89 MG/DL
EGFRCR SERPLBLD CKD-EPI 2021: 75 ML/MIN/1.73M2 (ref 60–?)
EGFRCR SERPLBLD CKD-EPI 2021: 78 ML/MIN/1.73M2 (ref 60–?)
EOSINOPHIL # BLD AUTO: 0.13 X10(3) UL (ref 0–0.7)
EOSINOPHIL NFR BLD AUTO: 2.7 %
ERYTHROCYTE [DISTWIDTH] IN BLOOD BY AUTOMATED COUNT: 12.5 %
EST. AVERAGE GLUCOSE BLD GHB EST-MCNC: 105 MG/DL (ref 68–126)
FASTING PATIENT LIPID ANSWER: NO
FASTING STATUS PATIENT QL REPORTED: NO
FASTING STATUS PATIENT QL REPORTED: NO
GLOBULIN PLAS-MCNC: 2.6 G/DL (ref 2–3.5)
GLUCOSE BLD-MCNC: 105 MG/DL (ref 70–99)
GLUCOSE BLD-MCNC: 110 MG/DL (ref 70–99)
HBA1C MFR BLD: 5.3 % (ref ?–5.7)
HCT VFR BLD AUTO: 41.1 %
HDLC SERPL-MCNC: 50 MG/DL (ref 40–59)
HGB BLD-MCNC: 14.3 G/DL
IMM GRANULOCYTES # BLD AUTO: 0.01 X10(3) UL (ref 0–1)
IMM GRANULOCYTES NFR BLD: 0.2 %
LDLC SERPL CALC-MCNC: 122 MG/DL (ref ?–100)
LYMPHOCYTES # BLD AUTO: 0.88 X10(3) UL (ref 1–4)
LYMPHOCYTES NFR BLD AUTO: 18.3 %
MCH RBC QN AUTO: 31.6 PG (ref 26–34)
MCHC RBC AUTO-ENTMCNC: 34.8 G/DL (ref 31–37)
MCV RBC AUTO: 90.9 FL
MONOCYTES # BLD AUTO: 0.29 X10(3) UL (ref 0.1–1)
MONOCYTES NFR BLD AUTO: 6 %
NEUTROPHILS # BLD AUTO: 3.47 X10 (3) UL (ref 1.5–7.7)
NEUTROPHILS # BLD AUTO: 3.47 X10(3) UL (ref 1.5–7.7)
NEUTROPHILS NFR BLD AUTO: 72.4 %
NONHDLC SERPL-MCNC: 152 MG/DL (ref ?–130)
OSMOLALITY SERPL CALC.SUM OF ELEC: 283 MOSM/KG (ref 275–295)
OSMOLALITY SERPL CALC.SUM OF ELEC: 287 MOSM/KG (ref 275–295)
PLATELET # BLD AUTO: 208 10(3)UL (ref 150–450)
POTASSIUM SERPL-SCNC: 3.6 MMOL/L (ref 3.5–5.1)
POTASSIUM SERPL-SCNC: 3.6 MMOL/L (ref 3.5–5.1)
PROT SERPL-MCNC: 7.2 G/DL (ref 5.7–8.2)
RBC # BLD AUTO: 4.52 X10(6)UL
SODIUM SERPL-SCNC: 137 MMOL/L (ref 136–145)
SODIUM SERPL-SCNC: 139 MMOL/L (ref 136–145)
TRIGL SERPL-MCNC: 171 MG/DL (ref 30–149)
TSI SER-ACNC: 1.81 MIU/ML (ref 0.55–4.78)
VLDLC SERPL CALC-MCNC: 30 MG/DL (ref 0–30)
WBC # BLD AUTO: 4.8 X10(3) UL (ref 4–11)

## 2024-10-23 PROCEDURE — 87427 SHIGA-LIKE TOXIN AG IA: CPT

## 2024-10-23 PROCEDURE — 87015 SPECIMEN INFECT AGNT CONCNTJ: CPT

## 2024-10-23 PROCEDURE — 36415 COLL VENOUS BLD VENIPUNCTURE: CPT

## 2024-10-23 PROCEDURE — 87272 CRYPTOSPORIDIUM AG IF: CPT

## 2024-10-23 PROCEDURE — 87046 STOOL CULTR AEROBIC BACT EA: CPT

## 2024-10-23 PROCEDURE — 80053 COMPREHEN METABOLIC PANEL: CPT

## 2024-10-23 PROCEDURE — 87329 GIARDIA AG IA: CPT

## 2024-10-23 PROCEDURE — 87045 FECES CULTURE AEROBIC BACT: CPT

## 2024-10-23 PROCEDURE — 80061 LIPID PANEL: CPT

## 2024-10-23 PROCEDURE — 84443 ASSAY THYROID STIM HORMONE: CPT

## 2024-10-23 PROCEDURE — 85025 COMPLETE CBC W/AUTO DIFF WBC: CPT

## 2024-10-23 PROCEDURE — 83036 HEMOGLOBIN GLYCOSYLATED A1C: CPT

## 2024-10-23 NOTE — PROGRESS NOTES
This is a telemedicine visit with live, interactive video and audio.     Patient understands and accepts financial responsibility for any deductible, co-insurance and/or co-pays associated with this service.    SUBJECTIVE    59-year-old female complaining of lightheadedness/dizziness sensation over the past 2 days.  Sensation comes and goes and could last up to 2 minutes then resolves.  Mentions she was in [place] and returned on 10/20/2024.  4-5 days ago started having diarrhea which worsened over the past 2 days.  At this point it is completely watery and states had about 15 episodes today.  Symptoms associated with nausea.  She is able to hydrate and has been using Pedialyte.  She had Shrimp Poboy when in [place], however no raw foods or oysters.  No known exposure to C. difficile  Denies fever, chills, vomiting, abdominal pain, chest pain.    HISTORY:  Past Medical History:    Acne    Allergic rhinitis    Anxiety    Back pain    Dysmenorrhea    Hematuria    Obesity    Palpitation    Rupture of Achilles tendon    Unspecified essential hypertension    UTI (lower urinary tract infection)      Past Surgical History:   Procedure Laterality Date          Colonoscopy N/A 2017    Procedure: COLONOSCOPY, POSSIBLE BIOPSY, POSSIBLE POLYPECTOMY 36440;  Surgeon: Shravan Feliz MD;  [place] [facility] [place]    Colonoscopy & polypectomy  - repeat 10 yrs    polyp; tics; hyperplastic    D & c      Endometrial ablation      Repair achilles tendon,primary        Family History   Problem Relation Age of Onset    Other (Other) Father     Other (alchoholism) Father     Other (Other) Mother     Other (pulmonary embolism) Mother     Hypertension Mother     Breast Cancer Sister 55      Social History     Socioeconomic History    Marital status:    Tobacco Use    Smoking status: Former     Current packs/day: 0.00     Average packs/day: 1 pack/day for 20.0 years (20.0 ttl  pk-yrs)     Types: Cigarettes     Start date: 10/24/1976     Quit date: 10/24/1996     Years since quittin.0    Smokeless tobacco: Never    Tobacco comments:     quit    Vaping Use    Vaping status: Never Used   Substance and Sexual Activity    Alcohol use: Yes     Alcohol/week: 6.0 standard drinks of alcohol     Types: 6 Glasses of wine per week     Comment: 1/2x per month     Drug use: No     Comment: CBD oil/bath    Sexual activity: Yes     Partners: Male   Other Topics Concern    Caffeine Concern Yes    Stress Concern Yes     Comment: Covid    Weight Concern Yes    Special Diet No    Exercise Yes    Seat Belt Yes   Social History Narrative    The patient does not use an assistive device..      The patient Does live in a home with stairs.        Allergies[1]   Current Outpatient Medications   Medication Sig Dispense Refill    hydroCHLOROthiazide 25 MG Oral Tab TAKE 1 TABLET DAILY 90 tablet 1    Phentermine HCl 37.5 MG Oral Tab Take 1 tablet (37.5 mg total) by mouth before breakfast. 30 tablet 3    Tirzepatide-Weight Management (ZEPBOUND) 2.5 MG/0.5ML Subcutaneous Solution Auto-injector Inject 2.5 mg into the skin every 7 days. (Patient not taking: Reported on 10/22/2024) 2 mL 0    celecoxib 100 MG Oral Cap Take 1 capsule by mouth two times daily as needed for Pain. 180 capsule 2       OBJECTIVE  Physical Exam:   alert, appears stated age, cooperative, and no distress, Speaking in full sentences comfortably, and Normal work of breathing    ASSESSMENT & PLAN    1. Dizziness (Primary)  Advised about limitation of telehealth visit.  Patient does not have a BP machine at home to check BP and pulse.  Likely due to GI symptoms.  -Continue to hydrate well and replenish electrolytes  -Supportive care  -ED precautions discussed  -     Basic Metabolic Panel (8); Future; Expected date: 10/22/2024  2. Diarrhea of presumed infectious origin  -Continue to hydrate well and replenish electrolytes  -Supportive care  -Stool  studies if no improvement.  -     Stool Culture W/Shigatoxin; Future; Expected date: 10/22/2024  -     Giardia + Crypto Antigen, Stool; Future; Expected date: 10/22/2024        Return if symptoms worsen or fail to improve.    Bryant Garcia MD           [1] No Known Allergies

## 2024-10-24 LAB
CRYPTOSP AG STL QL IA: NEGATIVE
G LAMBLIA AG STL QL IA: NEGATIVE

## 2024-12-31 DIAGNOSIS — M79.2 NEUROPATHIC PAIN: ICD-10-CM

## 2024-12-31 RX ORDER — CELECOXIB 100 MG/1
100 CAPSULE ORAL 2 TIMES DAILY PRN
Qty: 180 CAPSULE | Refills: 0 | Status: SHIPPED | OUTPATIENT
Start: 2024-12-31

## 2024-12-31 NOTE — TELEPHONE ENCOUNTER
Refilled Celebrex. Sent message to  to contact patient to schedule physical since she is due for one

## 2025-01-27 ENCOUNTER — OFFICE VISIT (OUTPATIENT)
Dept: FAMILY MEDICINE CLINIC | Facility: CLINIC | Age: 60
End: 2025-01-27
Payer: COMMERCIAL

## 2025-01-27 VITALS
OXYGEN SATURATION: 98 % | DIASTOLIC BLOOD PRESSURE: 80 MMHG | SYSTOLIC BLOOD PRESSURE: 134 MMHG | HEART RATE: 78 BPM | WEIGHT: 214 LBS | BODY MASS INDEX: 33.59 KG/M2 | HEIGHT: 67 IN | RESPIRATION RATE: 21 BRPM

## 2025-01-27 DIAGNOSIS — E66.09 CLASS 1 OBESITY DUE TO EXCESS CALORIES WITH SERIOUS COMORBIDITY AND BODY MASS INDEX (BMI) OF 33.0 TO 33.9 IN ADULT: ICD-10-CM

## 2025-01-27 DIAGNOSIS — E66.811 CLASS 1 OBESITY DUE TO EXCESS CALORIES WITH SERIOUS COMORBIDITY AND BODY MASS INDEX (BMI) OF 33.0 TO 33.9 IN ADULT: ICD-10-CM

## 2025-01-27 DIAGNOSIS — Z59.10 INADEQUATE HOUSING, UNSPECIFIED: ICD-10-CM

## 2025-01-27 DIAGNOSIS — M47.816 LUMBAR ARTHROPATHY: ICD-10-CM

## 2025-01-27 DIAGNOSIS — M79.2 NEUROPATHIC PAIN: ICD-10-CM

## 2025-01-27 DIAGNOSIS — I10 HYPERTENSION, UNSPECIFIED TYPE: ICD-10-CM

## 2025-01-27 DIAGNOSIS — Z59.82 TRANSPORTATION INSECURITY: ICD-10-CM

## 2025-01-27 DIAGNOSIS — Z23 NEED FOR VACCINATION: ICD-10-CM

## 2025-01-27 DIAGNOSIS — Z00.00 ROUTINE GENERAL MEDICAL EXAMINATION AT A HEALTH CARE FACILITY: Primary | ICD-10-CM

## 2025-01-27 PROBLEM — K42.9 UMBILICAL HERNIA WITHOUT OBSTRUCTION AND WITHOUT GANGRENE: Status: ACTIVE | Noted: 2025-01-27

## 2025-01-27 RX ORDER — PHENTERMINE HYDROCHLORIDE 37.5 MG/1
37.5 TABLET ORAL
Qty: 30 TABLET | Refills: 0 | Status: CANCELLED | OUTPATIENT
Start: 2025-01-27

## 2025-01-27 RX ORDER — PHENTERMINE HYDROCHLORIDE 15 MG/1
15 CAPSULE ORAL EVERY MORNING
Qty: 30 CAPSULE | Refills: 2 | Status: SHIPPED | OUTPATIENT
Start: 2025-01-27

## 2025-01-27 RX ORDER — GABAPENTIN 100 MG/1
100 CAPSULE ORAL NIGHTLY
COMMUNITY
Start: 2024-05-28

## 2025-01-27 SDOH — ECONOMIC STABILITY - TRANSPORTATION SECURITY: TRANSPORTATION INSECURITY: Z59.82

## 2025-01-27 SDOH — ECONOMIC STABILITY - HOUSING INSECURITY: INADEQUATE HOUSING UNSPECIFIED: Z59.10

## 2025-01-27 NOTE — PROGRESS NOTES
Trupti Long is a 59 year old female.    CC:    Chief Complaint   Patient presents with    Well Adult     Reviewed Preventative/Wellness form with patient.         HPI:  Wellness     Exercise: Yes  Drinks water: Yes  Eat variety of fruits & vegetables, lean meats: Yes  Issues with sleep: No  Regular dental exams: Yes  Change in size/consistency of stool: No  Change in appearance of mole: No    Gyne Hx  OB History    Para Term  AB Living   2 2 2 0 0 2   SAB IAB Ectopic Multiple Live Births   0 0 0 0 2     No LMP recorded. Patient has had an ablation.  Previous pap: normal     CAGE Alcohol Screening       2025     2:28 PM   CAGE Flowsheet   Have you ever felt you should Cut down on your drinking? 0   Have people Annoyed you by criticizing your drinking? 0   Have you ever felt bad or Guilty about your drinking? 0   Have you ever had a drink first thing in the morning to steady your nerves or to get rid of a hangover (Eye opener)? 0   Total Score: Item responses on the CAGE are scored 0 or 1, with a higher score an indication of alcohol 0   Total Score: Item responses on the CAGE are scored 0 or 1, with a higher score an indication of alcohol No Risk        Depression Screening (PHQ-2/PHQ-9): Over the LAST 2 WEEKS   Little interest or pleasure in doing things: Not at all    Feeling down, depressed, or hopeless: Not at all    PHQ-2 SCORE: 0          Fisher Suicide Severity Rating Scale Screener   In what setting is the screener performed?: an office visit  1. Have you wished you were dead or wished you could go to sleep and not wake up? (past 30 days): No  2. Have you actually had any thoughts of killing yourself? (past 30 days): No  6. Have you ever done anything, started to do anything, or prepared to do anything to end your life? (lifetime): No  Score and Suggested Actions - Office Visit: No Risk  Score and Intervention  Score and Suggested Actions - Office Visit: No Risk    Also wants to  SHEFALI MCKEON  [id number]    Pt is a 68y year old Male s/p right TKR. pain is 2/10. (+) Voids, tolerating regular diet. Denies chest pain/shortness of breath/nausea/vomitting.     Vital Signs Last 24 Hrs  T(C): 36.9 (30 Jun 2020 11:29), Max: 36.9 (30 Jun 2020 11:29)  T(F): 98.4 (30 Jun 2020 11:29), Max: 98.4 (30 Jun 2020 11:29)  HR: 74 (30 Jun 2020 11:29) (59 - 74)  BP: 121/69 (30 Jun 2020 11:29) (109/65 - 136/82)  BP(mean): --  RR: 18 (30 Jun 2020 11:29) (14 - 19)  SpO2: 98% (30 Jun 2020 11:29) (94% - 100%)    I&O's Detail    29 Jun 2020 07:01  -  30 Jun 2020 07:00  --------------------------------------------------------  IN:    lactated ringers.: 1800 mL    lactated ringers.: 1100 mL    Oral Fluid: 500 mL    Sodium Chloride 0.9% IV Bolus: 500 mL  Total IN: 3900 mL    OUT:    Voided: 1200 mL  Total OUT: 1200 mL    Total NET: 2700 mL                                11.2   12.25 )-----------( 194      ( 30 Jun 2020 06:25 )             33.9     06-30    137  |  103  |  19  ----------------------------<  147<H>  3.9   |  23  |  1.27    Ca    8.6      30 Jun 2020 06:25    TPro  6.6  /  Alb  3.3  /  TBili  0.5  /  DBili  0.1  /  AST  22  /  ALT  20  /  AlkPhos  66  06-30        PE:   RLE: Dressing changed, incision clean and dry, no erythema or draininage, prineo intact SILT distally, (+2) DP/PT pulses, EHL/FHL/TA intact, Capillary refill < 2 seconds. negative calf tenderness.PAS on,    A: 68y year old Male s/p right TKR POD#1    Plan:   -DVT ppx =  aspirin enteric coated 81 milliGRAM(s) Oral every 12 hours    -PT/OT = OOB  -Pain control   -Medicine to follow   -continue to follow Labs  -continue use of PAS  -Dispo: home today pending clearance from MD/PT discuss:     Weight   Has trouble losing weight; +increased stress (work/home), limitations to food choices due to work   Has tried phentermine - loses weight, but if not taking, typically will regain.       Follow up     Back pain  Leg pain   No changes       Allergies:  Allergies[1]   Current Meds:  Medications Ordered Prior to Encounter[2]     History:  Past Medical History:    Acne    Allergic rhinitis    Anxiety    Back pain    Dysmenorrhea    Hematuria    Obesity    Palpitation    Rupture of Achilles tendon    Unspecified essential hypertension    UTI (lower urinary tract infection)      Past Surgical History:   Procedure Laterality Date          Colonoscopy N/A 2017    Procedure: COLONOSCOPY, POSSIBLE BIOPSY, POSSIBLE POLYPECTOMY 39682;  Surgeon: Shravan Feliz MD;  Location: [facility]    Colonoscopy & polypectomy  - repeat 10 yrs    polyp; tics; hyperplastic    D & c      Endometrial ablation      Repair achilles tendon,primary        Family History   Problem Relation Age of Onset    Other (Other) Father     Other (alchoholism) Father     Other (Other) Mother     Other (pulmonary embolism) Mother     Hypertension Mother     Breast Cancer Sister 55      Family Status   Relation Status    Fa     Mo     Sis (Not Specified)      Social History     Socioeconomic History    Marital status:    Tobacco Use    Smoking status: Former     Current packs/day: 0.00     Average packs/day: 1 pack/day for 20.0 years (20.0 ttl pk-yrs)     Types: Cigarettes     Start date: 10/24/1976     Quit date: 10/24/1996     Years since quittin.2    Smokeless tobacco: Never    Tobacco comments:     quit    Vaping Use    Vaping status: Never Used   Substance and Sexual Activity    Alcohol use: Yes     Alcohol/week: 6.0 standard drinks of alcohol     Types: 6 Glasses of wine per week     Comment: 1/2x per month     Drug use: No     Comment: CBD oil/bath    Sexual  activity: Yes     Partners: Male   Other Topics Concern    Caffeine Concern Yes    Stress Concern Yes     Comment: Covid    Weight Concern Yes    Special Diet No    Exercise Yes    Seat Belt Yes   Social History Narrative    The patient does not use an assistive device..      The patient Does live in a home with stairs.     Social Drivers of Health     Food Insecurity: No Food Insecurity (1/27/2025)    NCSS - Food Insecurity     Worried About Running Out of Food in the Last Year: No     Ran Out of Food in the Last Year: No   Transportation Needs: Unmet Transportation Needs (1/27/2025)    NCSS - Transportation     Lack of Transportation: Yes   Housing Stability: At Risk (1/27/2025)    NCSS - Housing/Utilities     Has Housing: Yes     Worried About Losing Housing: Yes     Unable to Get Utilities: No            Immunization History   Administered Date(s) Administered    Covid-19 Vaccine Moderna 100 mcg/0.5 ml 01/25/2021, 02/22/2021    Influenza 10/22/2019    TDAP 03/13/2017       Wt Readings from Last 6 Encounters:   01/27/25 214 lb (97.1 kg)   08/19/24 207 lb (93.9 kg)   08/12/24 205 lb (93 kg)   01/11/24 212 lb (96.2 kg)   08/13/23 210 lb (95.3 kg)   06/12/23 200 lb (90.7 kg)       BP Readings from Last 3 Encounters:   01/27/25 134/80   08/19/24 120/80   08/12/24 124/86       REVIEW OF SYSTEMS:   Review of Systems   Constitutional:  Negative for chills, fever and malaise/fatigue.   HENT:  Negative for congestion, ear pain and sore throat.    Eyes:  Negative for blurred vision, double vision and pain.   Respiratory:  Negative for cough, shortness of breath and wheezing.    Cardiovascular:  Negative for chest pain, palpitations and leg swelling.   Gastrointestinal:  Negative for abdominal pain, blood in stool, constipation, diarrhea, melena, nausea and vomiting.   Genitourinary:  Negative for dysuria, flank pain and frequency.   Musculoskeletal:  Positive for back pain and myalgias. Negative for joint pain.   Skin:   Negative for itching and rash.   Neurological:  Negative for dizziness, weakness and headaches.   Endo/Heme/Allergies:  Negative for environmental allergies and polydipsia. Does not bruise/bleed easily.   Psychiatric/Behavioral:  Negative for depression. The patient is not nervous/anxious and does not have insomnia.        EXAM:   /80   Pulse 78   Resp 21   Ht 5' 7\" (1.702 m)   Wt 214 lb (97.1 kg)   SpO2 98%   BMI 33.52 kg/m²   Body mass index is 33.52 kg/m².  No LMP recorded. Patient has had an ablation.    Physical Exam  Constitutional:       General: She is not in acute distress.     Appearance: Normal appearance.   HENT:      Right Ear: Tympanic membrane normal.      Left Ear: Tympanic membrane normal.      Mouth/Throat:      Mouth: Mucous membranes are moist.      Pharynx: Oropharynx is clear. No posterior oropharyngeal erythema.   Eyes:      Extraocular Movements: Extraocular movements intact.      Conjunctiva/sclera: Conjunctivae normal.      Pupils: Pupils are equal, round, and reactive to light.   Cardiovascular:      Rate and Rhythm: Normal rate and regular rhythm.      Pulses: Normal pulses.      Heart sounds: Normal heart sounds.   Pulmonary:      Effort: Pulmonary effort is normal.      Breath sounds: Normal breath sounds. No wheezing or rhonchi.   Abdominal:      General: Abdomen is flat. Bowel sounds are normal.      Palpations: Abdomen is soft.      Tenderness: There is no abdominal tenderness. There is no guarding.   Musculoskeletal:         General: No swelling, tenderness, deformity or signs of injury.      Cervical back: Neck supple.   Lymphadenopathy:      Cervical: No cervical adenopathy.   Skin:     General: Skin is warm and dry.      Findings: No rash.   Neurological:      General: No focal deficit present.      Mental Status: She is alert and oriented to person, place, and time.      Motor: No weakness.   Psychiatric:         Mood and Affect: Mood normal.         Behavior:  Behavior normal.         Thought Content: Thought content normal.                ASSESSMENT/PLAN:      1. Routine general medical examination at a health care facility  - CBC With Differential With Platelet; Future  - Comp Metabolic Panel (14); Future  - Lipid Panel; Future  - TSH W Reflex To Free T4; Future  Immunizations: due for shingles, pneumo, influenza   Screening Tests:  up to date   Follow Up Needed: pain specialist        2. Hypertension, unspecified type  Controlled     3. Lumbar arthropathy  Stable     4. Neuropathic pain  Stable     5. Inadequate housing, unspecified  Referrals placed     6. Transportation insecurity  Referrals placed     7. Class 1 obesity due to excess calories with serious comorbidity and body mass index (BMI) of 33.0 to 33.9 in adult  - Hemoglobin A1C; Future  - Phentermine HCl 15 MG Oral Cap; Take 1 capsule (15 mg total) by mouth every morning.  Dispense: 30 capsule; Refill: 2  Risk/benefit of phentermine discussed - want to avoid long term use of phentermine, but at current time she needs some added assistance with diet control - will use low dose   GLP1ra would be preferred but not covered by insurance - patient can consider if anything changes.     8. Need for vaccination  - INFLUENZA VACCINE, TRI, PRESERV FREE, 0.5 ML        Health Maintenance   Topic Date Due    Pneumococcal Vaccine: 50+ Years (1 of 1 - PCV) Never done    Zoster Vaccines (1 of 2) Never done    COVID-19 Vaccine (3 - 2024-25 season) 09/01/2024    Influenza Vaccine (1) 10/01/2024    Annual Physical  01/11/2025    Mammogram  10/03/2025    Pap Smear  11/30/2025    DTaP,Tdap,and Td Vaccines (2 - Td or Tdap) 03/13/2027    Colorectal Cancer Screening  07/11/2027    Annual Depression Screening  Completed    Meningococcal B Vaccine  Aged Out         Healthy diet and regular exercise discussed     Meds & Refills for this Visit:  Requested Prescriptions     Signed Prescriptions Disp Refills    Phentermine HCl 15 MG Oral  Cap 30 capsule 2     Sig: Take 1 capsule (15 mg total) by mouth every morning.         Imaging & Consults:  INFLUENZA VACCINE, TRI, PRESERV FREE, 0.5 ML    Return in about 6 months (around 7/27/2025) for weight loss, blood pressure.             [1] No Known Allergies  [2]   Current Outpatient Medications on File Prior to Visit   Medication Sig Dispense Refill    gabapentin 100 MG Oral Cap Take 1 capsule (100 mg total) by mouth nightly.      celecoxib 100 MG Oral Cap Take 1 capsule (100 mg total) by mouth 2 (two) times daily as needed for Pain. 180 capsule 0    hydroCHLOROthiazide 25 MG Oral Tab TAKE 1 TABLET DAILY 90 tablet 1     No current facility-administered medications on file prior to visit.

## 2025-01-27 NOTE — PATIENT INSTRUCTIONS
Pataday (olopatadine) eye drops - for allergy caused red eye     Magnesium glycinate 250mg nightly     Add mucinex for cough   Increase Vitamin C to improve immune system   Use neti pot every day when you are stuffy/congested      Get pneumonia vaccine and shingles vaccine (when you have 1-2 days off from work to recover from shingles vaccine)

## 2025-02-19 ENCOUNTER — LAB ENCOUNTER (OUTPATIENT)
Dept: LAB | Age: 60
End: 2025-02-19
Attending: FAMILY MEDICINE
Payer: COMMERCIAL

## 2025-02-19 DIAGNOSIS — Z00.00 ROUTINE GENERAL MEDICAL EXAMINATION AT A HEALTH CARE FACILITY: ICD-10-CM

## 2025-02-19 DIAGNOSIS — E66.09 CLASS 1 OBESITY DUE TO EXCESS CALORIES WITH SERIOUS COMORBIDITY AND BODY MASS INDEX (BMI) OF 33.0 TO 33.9 IN ADULT: ICD-10-CM

## 2025-02-19 DIAGNOSIS — E66.811 CLASS 1 OBESITY DUE TO EXCESS CALORIES WITH SERIOUS COMORBIDITY AND BODY MASS INDEX (BMI) OF 33.0 TO 33.9 IN ADULT: ICD-10-CM

## 2025-02-19 LAB
ALBUMIN SERPL-MCNC: 4.7 G/DL (ref 3.2–4.8)
ALBUMIN/GLOB SERPL: 1.8 {RATIO} (ref 1–2)
ALP LIVER SERPL-CCNC: 107 U/L
ALT SERPL-CCNC: 17 U/L
ANION GAP SERPL CALC-SCNC: 8 MMOL/L (ref 0–18)
AST SERPL-CCNC: 22 U/L (ref ?–34)
BASOPHILS # BLD AUTO: 0.04 X10(3) UL (ref 0–0.2)
BASOPHILS NFR BLD AUTO: 0.7 %
BILIRUB SERPL-MCNC: 0.6 MG/DL (ref 0.3–1.2)
BUN BLD-MCNC: 11 MG/DL (ref 9–23)
CALCIUM BLD-MCNC: 9.6 MG/DL (ref 8.7–10.6)
CHLORIDE SERPL-SCNC: 105 MMOL/L (ref 98–112)
CHOLEST SERPL-MCNC: 210 MG/DL (ref ?–200)
CO2 SERPL-SCNC: 28 MMOL/L (ref 21–32)
CREAT BLD-MCNC: 0.85 MG/DL
EGFRCR SERPLBLD CKD-EPI 2021: 79 ML/MIN/1.73M2 (ref 60–?)
EOSINOPHIL # BLD AUTO: 0.12 X10(3) UL (ref 0–0.7)
EOSINOPHIL NFR BLD AUTO: 2.1 %
ERYTHROCYTE [DISTWIDTH] IN BLOOD BY AUTOMATED COUNT: 12.5 %
EST. AVERAGE GLUCOSE BLD GHB EST-MCNC: 111 MG/DL (ref 68–126)
FASTING PATIENT LIPID ANSWER: YES
FASTING STATUS PATIENT QL REPORTED: YES
GLOBULIN PLAS-MCNC: 2.6 G/DL (ref 2–3.5)
GLUCOSE BLD-MCNC: 97 MG/DL (ref 70–99)
HBA1C MFR BLD: 5.5 % (ref ?–5.7)
HCT VFR BLD AUTO: 39.9 %
HDLC SERPL-MCNC: 51 MG/DL (ref 40–59)
HGB BLD-MCNC: 13.7 G/DL
IMM GRANULOCYTES # BLD AUTO: 0.01 X10(3) UL (ref 0–1)
IMM GRANULOCYTES NFR BLD: 0.2 %
LDLC SERPL CALC-MCNC: 136 MG/DL (ref ?–100)
LYMPHOCYTES # BLD AUTO: 2.18 X10(3) UL (ref 1–4)
LYMPHOCYTES NFR BLD AUTO: 38.4 %
MCH RBC QN AUTO: 31 PG (ref 26–34)
MCHC RBC AUTO-ENTMCNC: 34.3 G/DL (ref 31–37)
MCV RBC AUTO: 90.3 FL
MONOCYTES # BLD AUTO: 0.52 X10(3) UL (ref 0.1–1)
MONOCYTES NFR BLD AUTO: 9.2 %
NEUTROPHILS # BLD AUTO: 2.8 X10 (3) UL (ref 1.5–7.7)
NEUTROPHILS # BLD AUTO: 2.8 X10(3) UL (ref 1.5–7.7)
NEUTROPHILS NFR BLD AUTO: 49.4 %
NONHDLC SERPL-MCNC: 159 MG/DL (ref ?–130)
OSMOLALITY SERPL CALC.SUM OF ELEC: 291 MOSM/KG (ref 275–295)
PLATELET # BLD AUTO: 249 10(3)UL (ref 150–450)
POTASSIUM SERPL-SCNC: 4.2 MMOL/L (ref 3.5–5.1)
PROT SERPL-MCNC: 7.3 G/DL (ref 5.7–8.2)
RBC # BLD AUTO: 4.42 X10(6)UL
SODIUM SERPL-SCNC: 141 MMOL/L (ref 136–145)
TRIGL SERPL-MCNC: 126 MG/DL (ref 30–149)
TSI SER-ACNC: 2.12 UIU/ML (ref 0.55–4.78)
VLDLC SERPL CALC-MCNC: 23 MG/DL (ref 0–30)
WBC # BLD AUTO: 5.7 X10(3) UL (ref 4–11)

## 2025-02-19 PROCEDURE — 83036 HEMOGLOBIN GLYCOSYLATED A1C: CPT | Performed by: FAMILY MEDICINE

## 2025-02-19 PROCEDURE — 80061 LIPID PANEL: CPT | Performed by: FAMILY MEDICINE

## 2025-02-19 PROCEDURE — 80050 GENERAL HEALTH PANEL: CPT | Performed by: FAMILY MEDICINE

## 2025-03-20 ENCOUNTER — TELEPHONE (OUTPATIENT)
Dept: PHYSICAL MEDICINE AND REHAB | Facility: CLINIC | Age: 60
End: 2025-03-20

## 2025-03-20 ENCOUNTER — OFFICE VISIT (OUTPATIENT)
Dept: PHYSICAL MEDICINE AND REHAB | Facility: CLINIC | Age: 60
End: 2025-03-20
Payer: COMMERCIAL

## 2025-03-20 VITALS — OXYGEN SATURATION: 96 % | BODY MASS INDEX: 33.59 KG/M2 | HEIGHT: 67 IN | WEIGHT: 214 LBS | HEART RATE: 74 BPM

## 2025-03-20 DIAGNOSIS — M54.16 LEFT LUMBAR RADICULOPATHY: Primary | ICD-10-CM

## 2025-03-20 PROCEDURE — 3008F BODY MASS INDEX DOCD: CPT | Performed by: PHYSICAL MEDICINE & REHABILITATION

## 2025-03-20 PROCEDURE — 99214 OFFICE O/P EST MOD 30 MIN: CPT | Performed by: PHYSICAL MEDICINE & REHABILITATION

## 2025-03-20 NOTE — PROGRESS NOTES
The following individual(s) verbally consented to be recorded using ambient AI listening technology and understand that they can each withdraw their consent to this listening technology at any point by asking the clinician to turn off or pause the recording:    Patient name: Trupti Long

## 2025-03-20 NOTE — TELEPHONE ENCOUNTER
Initiated authorization for Left L5 TFESI under fluoroscopy, oral valium. CPT/HCPCS 04140 dx:M54.16 to be done at [facility] with Marilyn with [employer].    Clinicals faxed/uploaded to [employer] at [phone]  Status: Pending  Case # [id number]

## 2025-03-21 NOTE — TELEPHONE ENCOUNTER
Received approval via fax. Fax has been sent to be scanned under the Media tab.    Status: Approved  Reference/Authorization # [id number]  Valid: [id number]  Authorization is not a guarantee of payment and may be subject to review once claim is submitted.

## 2025-03-21 NOTE — PROGRESS NOTES
The following individual(s) verbally consented to be recorded using ambient AI listening technology and understand that they can each withdraw their consent to this listening technology at any point by asking the clinician to turn off or pause the recording:    Patient name: Trupti Long     Progress note    C/C:   Chief Complaint   Patient presents with    Follow - Up     LOV 02/10/2023. Pt is here for f/u for L leg pain that radiates from hip to foot. Pain is 7/10. Reports n/t and weakness. Taking celecoxib once daily. Reports having EMG done within last year through [facility]. Previous Left L5 transforaminal epidural steroid injection 05/26/21 provided substantial relief.         History of Present Illness  Trupti Long is a 59 year old female who presents with worsening left leg weakness and foot symptoms.    She experiences weakness in the left leg with accompanying lumbar spine stiffness. There is aching and soreness on the top of her foot. Occasional purplish bruising along the big toe resolves by the next day. Her leg sometimes feels like it is 'dropping'.    She has chronic left foot and ankle issues managed with physical therapy and exercise, but symptoms have worsened recently. Significant stiffness in her back and fluctuating ankle swelling occur throughout the day. Was seen by pain management and underwent lumbar sympathetic nerve blocks; does not recall if these helped. There was a discussion on spinal cord stimulator, which she deferred.     Weakness in her leg sometimes prevents her from engaging in activities, and pain can be severe at times. She works as a  and wears gym shoes at work due to foot issues, with a previous note allowing this accommodation. She is asking for another work note to allow her to wear gym shoes at work.     Pertinent allergies: Allergies[1]     Physical exam:  Pulse 74   Ht 67\"   Wt 214 lb (97.1 kg)   SpO2 96%   BMI 33.52 kg/m²       Lumbar spine exam:    No skin rash lumbar/upper sacral region  No pain with lumbar flexion. Mild/minimal pain with lumbar extension.  Slight difficulty with toe walking. Otherwise normal b/l LE  Decreased sensation dorsum of the left foot  2/4 quad, 1/4 gastrocs reflexes b/l  SLR negative b/l    IMAGING: No new imaging to review. Part of an EMG report was [facility] as per below, dated 6/24/2024:    Left lower extremity EMG/NCV study reveals small amplitude waveforms for both the peroneal and tibial nerves. Peroneal conduction velocity however is normal. Left tibial conduction velocity is somewhat generally delayed with small amplitude waveform being noted. Sural and superficial conduction studies are unremarkable. H-reflexes are absent. EMG testing is negative for denervation throughout the occlusion of the lumbar paraspinals.    Data Review:   Left lower extremity EMG/NCV study revealed small amplitude waveforms for the peroneal and tibial nerves. Distal latency for the peroneal nerve is normal. Tibial distal latency is delayed with evidence of temporal dispersion being noted. Sensory conduction studies reveal small amplitude waveforms that are obtained. H-reflexes are absent. EMG testing is negative for denervation throughout with inclusion of the lumbar paraspinals.    Impression:    Abnormal left lower extremity EMG/NCV study. 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 Consideration of checking the right leg would be appropriate. Specifically, tibial conduction velocity is just delayed consistent with tibial greater than peroneal neuropathy. There is no electrodiagnostic evidence for peripheral nerve impingement or left lumbar radiculopathy seen.   Assessment & Plan  Left leg weakness and pain    Chronic left leg weakness and pain with stiffness and intermittent discoloration may be due to radiculopathy vs CRPS. A more recent EMG was negative for  radiculopathy. Does not recall benefit from sympathetic nerve block x2; did have benefit from L5 TFESI, last done in 2021. Pain significantly impacts her daily activities. Order an MRI of the lumbar spine to evaluate for changes contributing to leg weakness. In the meantime can go ahead and get her set up for left L5 TFESI under fluoroscopy, oral valium for situational anxiety. We discussed the risks of procedure, including bleeding, infection, nerve injury, HA; elects to proceed.    Chronic left foot and ankle issues    Provide a letter for work accommodations allowing the use of sneakers while working    Jozef Mccray DO  Physical Medicine and Rehabilitation  [facility]         [1] No Known Allergies

## 2025-03-21 NOTE — TELEPHONE ENCOUNTER
Patient has been scheduled for Left L5 transforaminal epidural steroid injection on 4/11/2025 at [facility] with Dr. Ochoa.   Anesthesia type:  Oral sedation- Patient has been informed a  is required and Valium is provided at pharmacy.  Arrival time: 730am & Procedure time: 830am  Patient was advised that if he/she does receive the covid vaccine it needs to be at least 2 weeks before or after the injection.  Medications and allergies reviewed.  Educated to hold NSAIDS (Aleve, Ibuprofen, Motrin, Advil) and anti-inflammatories (Meloxicam, Naproxen, Diclofenac, Celebrex)  and for cervical injections must hold Multi-Vitamins, Vitamin E, Fish Oil/Omega-3 for 3 days prior to procedure.  Patient informed of [facility]'s  policy:  he/she will need a  to and from procedure.   [facility]  Address: [address] [place] [address].  Patient verbalized understanding and agrees with plan.  Scheduled in Epic: Yes  Scheduled in Surgical Case: Yes  Follow up appointment made: NOV: Visit date not found  Authorization dates: 6/17/2025.  Location needs to be updated.     Patient requesting valium for procedure/MRI. (Order pended)

## 2025-03-21 NOTE — H&P (VIEW-ONLY)
The following individual(s) verbally consented to be recorded using ambient AI listening technology and understand that they can each withdraw their consent to this listening technology at any point by asking the clinician to turn off or pause the recording:    Patient name: Trupti Long     Progress note    C/C:   Chief Complaint   Patient presents with    Follow - Up     LOV 02/10/2023. Pt is here for f/u for L leg pain that radiates from hip to foot. Pain is 7/10. Reports n/t and weakness. Taking celecoxib once daily. Reports having EMG done within last year through [facility]. Previous Left L5 transforaminal epidural steroid injection 05/26/21 provided substantial relief.         History of Present Illness  Trupti Long is a 59 year old female who presents with worsening left leg weakness and foot symptoms.    She experiences weakness in the left leg with accompanying lumbar spine stiffness. There is aching and soreness on the top of her foot. Occasional purplish bruising along the big toe resolves by the next day. Her leg sometimes feels like it is 'dropping'.    She has chronic left foot and ankle issues managed with physical therapy and exercise, but symptoms have worsened recently. Significant stiffness in her back and fluctuating ankle swelling occur throughout the day. Was seen by pain management and underwent lumbar sympathetic nerve blocks; does not recall if these helped. There was a discussion on spinal cord stimulator, which she deferred.     Weakness in her leg sometimes prevents her from engaging in activities, and pain can be severe at times. She works as a  and wears gym shoes at work due to foot issues, with a previous note allowing this accommodation. She is asking for another work note to allow her to wear gym shoes at work.     Pertinent allergies: Allergies[1]     Physical exam:  Pulse 74   Ht 67\"   Wt 214 lb (97.1 kg)   SpO2 96%   BMI 33.52 kg/m²       Lumbar spine exam:    No skin rash lumbar/upper sacral region  No pain with lumbar flexion. Mild/minimal pain with lumbar extension.  Slight difficulty with toe walking. Otherwise normal b/l LE  Decreased sensation dorsum of the left foot  2/4 quad, 1/4 gastrocs reflexes b/l  SLR negative b/l    IMAGING: No new imaging to review. [facility] as per below, dated 6/24/2024:    Left lower extremity EMG/NCV study reveals small amplitude waveforms for both the peroneal and tibial nerves. Peroneal conduction velocity however is normal. Left tibial conduction velocity is somewhat generally delayed with small amplitude waveform being noted. Sural and superficial conduction studies are unremarkable. H-reflexes are absent. EMG testing is negative for denervation throughout the occlusion of the lumbar paraspinals.    Data Review:   Left lower extremity EMG/NCV study revealed small amplitude waveforms for the peroneal and tibial nerves. Distal latency for the peroneal nerve is normal. Tibial distal latency is delayed with evidence of temporal dispersion being noted. Sensory conduction studies reveal small amplitude waveforms that are obtained. H-reflexes are absent. EMG testing is negative for denervation throughout with inclusion of the lumbar paraspinals.    Impression:    Abnormal left lower extremity EMG/NCV study. 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 Consideration of checking the right leg would be appropriate. Specifically, tibial conduction velocity is just delayed consistent with tibial greater than peroneal neuropathy. There is no electrodiagnostic evidence for peripheral nerve impingement or left lumbar radiculopathy seen.   Assessment & Plan  Left leg weakness and pain    Chronic left leg weakness and pain with stiffness and intermittent discoloration may be due to radiculopathy vs CRPS. A more recent EMG was negative for  radiculopathy. Does not recall benefit from sympathetic nerve block x2; did have benefit from L5 TFESI, last done in 2021. Pain significantly impacts her daily activities. Order an MRI of the lumbar spine to evaluate for changes contributing to leg weakness. In the meantime can go ahead and get her set up for left L5 TFESI under fluoroscopy, oral valium for situational anxiety. We discussed the risks of procedure, including bleeding, infection, nerve injury, HA; elects to proceed.    Chronic left foot and ankle issues    Provide a letter for work accommodations allowing the use of sneakers while working    Jozef Mccray DO  Physical Medicine and Rehabilitation  [facility]         [1] No Known Allergies

## 2025-03-23 RX ORDER — DIAZEPAM 5 MG/1
TABLET ORAL
Qty: 2 TABLET | Refills: 0 | Status: SHIPPED | OUTPATIENT
Start: 2025-04-04

## 2025-04-11 ENCOUNTER — HOSPITAL ENCOUNTER (OUTPATIENT)
Facility: HOSPITAL | Age: 60
Setting detail: HOSPITAL OUTPATIENT SURGERY
Discharge: HOME | End: 2025-04-11
Attending: PHYSICAL MEDICINE & REHABILITATION | Admitting: PHYSICAL MEDICINE & REHABILITATION
Payer: COMMERCIAL

## 2025-04-11 ENCOUNTER — APPOINTMENT (OUTPATIENT)
Dept: GENERAL RADIOLOGY | Facility: HOSPITAL | Age: 60
End: 2025-04-11
Attending: PHYSICAL MEDICINE & REHABILITATION
Payer: COMMERCIAL

## 2025-04-11 VITALS
WEIGHT: 214 LBS | HEIGHT: 67 IN | TEMPERATURE: 98 F | RESPIRATION RATE: 17 BRPM | HEART RATE: 61 BPM | BODY MASS INDEX: 33.59 KG/M2 | SYSTOLIC BLOOD PRESSURE: 127 MMHG | DIASTOLIC BLOOD PRESSURE: 76 MMHG | OXYGEN SATURATION: 98 %

## 2025-04-11 PROBLEM — M54.16 LEFT LUMBAR RADICULOPATHY: Status: ACTIVE | Noted: 2025-04-11

## 2025-04-11 PROCEDURE — 64483 NJX AA&/STRD TFRM EPI L/S 1: CPT | Performed by: PHYSICAL MEDICINE & REHABILITATION

## 2025-04-11 RX ORDER — BETAMETHASONE SODIUM PHOSPHATE AND BETAMETHASONE ACETATE 3; 3 MG/ML; MG/ML
INJECTION, SUSPENSION INTRA-ARTICULAR; INTRALESIONAL; INTRAMUSCULAR; SOFT TISSUE
Status: DISCONTINUED | OUTPATIENT
Start: 2025-04-11 | End: 2025-04-11

## 2025-04-11 RX ORDER — LIDOCAINE HYDROCHLORIDE 10 MG/ML
INJECTION, SOLUTION EPIDURAL; INFILTRATION; INTRACAUDAL; PERINEURAL
Status: DISCONTINUED | OUTPATIENT
Start: 2025-04-11 | End: 2025-04-11

## 2025-04-11 NOTE — DISCHARGE INSTRUCTIONS
Home Care Instructions Following Your Pain Procedure     Trupti,  It has been a pleasure to have you as our patient. To help you at home, you must follow these general discharge instructions. We will review these with you before you are discharged. It is our hope that you have a complete and uneventful recovery from our procedure.     General Instructions:  What to Expect:  Bandages from your procedure today can be removed when you get home.  Please avoid soaking and/or swimming for 24 hours.  Showering is okay  It is normal to have increased pain symptoms and/or pain at injection site for up to 3-5 days after procedure, you can use heat or ice (20 minutes on 20 minutes off) for comfort.  You may experience some temporary side effects which may include restlessness or insomnia, flushing of the face, or heart palpitations.  Please contact the provider if these symptoms do not resolve within 3-4 days.  Lightheadedness or nausea may occur and should resolve within 24 to 48 hours.  If you develop a headache after treatment, rest, drink fluids (with caffeine, if possible) and take mild over-the-counter pain medication.  If the headache does not improve with the above treatment, contact the physician.  Home Medications:  Resume all previously prescribed medication.  Please avoid taking NSAIDs (Non-Steriodal Anti-Inflammatory Drugs) such as:  Ibuprofen ( Advil, Motrin) Aleve (Naproxen), Diclofenac, Meloxicam for 6 hours after procedure.   If you are on Coumadin (Warfarin) or any other anti-coagulant (or \"blood thinning\") medication such as Plavix (Clopidogrel), Xarelto (Rivaroxaban), Eliquis (Apixaban), Effient (Prasugrel) etc., restart on the following day from the procedure unless otherwise directed by your provider.  If you are a diabetic, please increase the frequency of your glucose monitoring after the procedure as steroids may cause a temporary (2-3 day) increase in your blood sugar.  Contact your primary care  physician if your blood sugar remains elevated as you may require some medication adjustment.  Diet:  Resume your regular diet as tolerated.  Activity:  We recommend that you relax and rest during the rest of your procedure day.  If you feel weakness in your arms or legs do not drive.  Follow-up Appointment  Please schedule a follow-up visit within 3 to 4 weeks after your last procedure date.  Question or Concerns:  Feel free to call our office with any questions or concerns at [phone] (option #2)    Trupti  Thank you for coming to [facility] for your procedure.  The nurses try very hard to make sure you receive the best care possible.  Your trust in them as well as us is greatly appreciated.    Thanks so much,   Dr. Jozef Mccray

## 2025-04-11 NOTE — INTERVAL H&P NOTE
Pre-op Diagnosis: Left lumbar radiculopathy [M54.16]    The above referenced H&P was reviewed by Jozef Mccray DO on 4/11/2025, the patient was examined and no significant changes have occurred in the patient's condition since the H&P was performed.  I discussed with the patient and/or legal representative the potential benefits, risks and side effects of this procedure; the likelihood of the patient achieving goals; and potential problems that might occur during recuperation.  I discussed reasonable alternatives to the procedure, including risks, benefits and side effects related to the alternatives and risks related to not receiving this procedure.  We will proceed with procedure as planned.

## 2025-04-11 NOTE — OPERATIVE REPORT
[facility] - Endoscopy/Pain Management Suite    LUMBAR TRANSFORAMINAL   NAME:  Trupti Long    MR #:    [id number] :  1965     PHYSICIAN:  Jozef Mccray DO        Operative Report    DATE OF PROCEDURE: 2025   PREOPERATIVE DIAGNOSES: Left lumbar radiculopathy [M54.16]   POSTOPERATIVE DIAGNOSES:   Same   PROCEDURES: left L5 transforaminal epidural steroid injection done under fluoroscopic guidance with contrast enhancement.   SURGEON: Jozef Mccray DO   ANESTHESIA: Local   INDICATIONS:      OPERATIVE PROCEDURE:  Written consent was obtained from the patient.  The patient was brought into the operating room and placed in the prone position on the fluoroscopy table with pillow underneath her abdomen.  The patient's skin was cleaned and draped in a normal sterile fashion.  Using AP fluoroscopy, all five lumbar vertebrae were identified.  When the fifth vertebra was identified, fluoroscopy was right anterior obliqued opening up the left L5-S1 intervertebral foramen.  At this point in time, the patient's skin was anesthetized with 1% PF lidocaine without epinephrine.  Then, a 5 inch, 22-gauge spinal needle was inserted and directed towards the left L5-S1 intervertebral foramen.  When it felt to be in good position, AP fluoroscopy was used to advance the needle to the 6 o'clock position on the left L5 pedicle.  At this point in time, Omnipaque-240 contrast was used to obtain a good epidurogram indicating correct needle placement.  Then, aspiration was performed.  No blood, fluid, or air was aspirated.  Then, the patient was injected with a 4 cc solution of 2 cc of 6 mg/cc of celestone and 2 cc of 1% PF lidocaine without epinephrine.  After this, the needle was removed.  The patient's skin was cleaned.  A Band-Aid was applied.  The patient was transferred to the cart and into [facility].  The patient was given discharge instructions and will follow up in the clinic as scheduled.  Throughout the whole  procedure, the patient's pulse oximetry and vital signs were monitored and they remained completely stable.  Also, throughout the whole procedure, prior to injection of any medication, aspiration was performed.  No blood, fluid, or air was aspirated at anytime.

## 2025-04-15 DIAGNOSIS — M54.16 LEFT LUMBAR RADICULOPATHY: ICD-10-CM

## 2025-04-15 RX ORDER — DIAZEPAM 5 MG/1
TABLET ORAL
Qty: 2 TABLET | Refills: 0 | Status: SHIPPED | OUTPATIENT
Start: 2025-04-15

## 2025-04-15 NOTE — TELEPHONE ENCOUNTER
Per patient comment \"I would need this for my MRI Friday otherwise I will have to cancel and do the open MRI \"    Last sent on 4/8/25 in [facility] pharmacy.     Spoke with patient and she stated that She used the valium pre procedure on 4/11/25 and will need valium prior to MRI scheduled.     Routing to Dr Mccray as high priority since patient will have MRI done this week.

## 2025-04-17 ENCOUNTER — HOSPITAL ENCOUNTER (OUTPATIENT)
Dept: MRI IMAGING | Facility: HOSPITAL | Age: 60
Discharge: HOME | End: 2025-04-17
Attending: PHYSICAL MEDICINE & REHABILITATION
Payer: COMMERCIAL

## 2025-04-17 ENCOUNTER — TELEPHONE (OUTPATIENT)
Dept: PHYSICAL MEDICINE AND REHAB | Facility: CLINIC | Age: 60
End: 2025-04-17

## 2025-04-17 DIAGNOSIS — M54.16 LEFT LUMBAR RADICULOPATHY: ICD-10-CM

## 2025-04-17 PROCEDURE — 72148 MRI LUMBAR SPINE W/O DYE: CPT | Performed by: PHYSICAL MEDICINE & REHABILITATION

## 2025-04-17 NOTE — TELEPHONE ENCOUNTER
Pt dropped off workpers for shoes- and FMLA paperwork due 4/27/25- sent to forms dept- emailed and interoffice

## 2025-04-25 NOTE — TELEPHONE ENCOUNTER
Called patient to clarify details for Family Medical Leave Act and Americans with Disabilities Act forms. Left voicemail to call back [phone].

## 2025-04-25 NOTE — TELEPHONE ENCOUNTER
Americans with Disabilities Act forms rec'd via email with valid authorization for Gertrude. Logged for processing.

## 2025-04-25 NOTE — TELEPHONE ENCOUNTER
Dr Mccray,    The patient is asking for Intermittent Family Medical Leave Act for Surgery,physical therapy,flare ups and appointments. She is not allowed to use sick days for any of these.    Start 4/11/25  End 4/11/26    1-2 flare ups a month lasting 2 days each  1-2 apts per week each lasting 1-3 days.    She needs them to last 1-3 days because she is a  and if it knocks her off of a trip it could be a 2 to 3 day trip that she needs to be covered for.     Do you support?    Thanks,    KKeyurC.

## 2025-04-25 NOTE — TELEPHONE ENCOUNTER
Patient called back to give details for Family Medical Leave Act. Patient is not allowed to use sick days. She needs to be covered intermittent for the day of surgery, apts, physical therapy and flare ups. She is a  so if she is gong on a trip but has to skip the trip because of the apt it and be a 3 day trip and she needs to be covered for. See letters in chart for the Americans with Disabilities Act.    Type of Leave: Intermittent Leave  Reason for Leave: Surgery  Start date of leave:04/11/25  End date of leave:04/11/26  How many flare ups per month/length?:1-2 flare ups last 2 days each  How many appts per month/length?: 1-2 per weeks 1-3 days each  Was Fee and Turnaround info Given?:       Type of Leave: ADA  Reason for Leave: chronic condition left foot ( usually writes a note but now she has to have a form filled out. Patient is suppose to wear heals. She needs to be able to wear gym shoes or orthopaedic shoes.   Start date of leave:4/11/25  End date of leave:4/11/26  How many flare ups per month/length?:  How many appts per month/length?:   Was Fee and Turnaround info Given?:

## 2025-04-26 NOTE — TELEPHONE ENCOUNTER
Dr. Mccray,     Please sign off on 2 forms if you agree to: Intermittent Family Medical Leave Act due to chronic left foot/leg pain and weakness, 1-2 flare ups/mo lasting 2 days and 1-2 appts/week lasting 1-3 days. Americans with Disabilities Act to wear a comfortable flat shoe or sneaker while working.    -Signature page will be the first page scanned  -From your Inbasket, Highlight the patient and click Chart   -Double click the 4/17/25 Forms Completion telephone encounter  -Scroll down to the Media section   -Click the blue Hyperlink: Family Medical Leave Act, Jozef Mccray, 4/25/25 and Americans with Disabilities Act, Jozef Mccray, 4/25/25  -Click Acknowledge located in the top right ribbon/menu   -Drag the mouse into the blank space of the document and a + sign will appear. Left click to   electronically sign the document.  -Once signed, simply exit out of the screen and you signature will be saved.     Thank you,  Magdalene THACKER

## 2025-04-29 NOTE — TELEPHONE ENCOUNTER
Forms completed and efaxed to Family Medical Leave Act source/[place] [phone]. [employer] message sent, fax confirmed.

## 2025-08-13 DIAGNOSIS — M79.2 NEUROPATHIC PAIN: ICD-10-CM

## 2025-08-13 DIAGNOSIS — I10 HYPERTENSION, UNSPECIFIED TYPE: ICD-10-CM

## 2025-08-15 RX ORDER — HYDROCHLOROTHIAZIDE 25 MG/1
TABLET ORAL
Qty: 90 TABLET | Refills: 3 | Status: SHIPPED | OUTPATIENT
Start: 2025-08-15

## 2025-08-15 RX ORDER — CELECOXIB 100 MG/1
100 CAPSULE ORAL 2 TIMES DAILY PRN
Qty: 180 CAPSULE | Refills: 0 | Status: SHIPPED | OUTPATIENT
Start: 2025-08-15

## (undated) DIAGNOSIS — M79.2 NEUROPATHIC PAIN: ICD-10-CM

## (undated) DIAGNOSIS — G90.522 COMPLEX REGIONAL PAIN SYNDROME TYPE 1 OF LEFT LOWER EXTREMITY: Primary | ICD-10-CM

## (undated) DIAGNOSIS — M79.2 NEUROPATHIC PAIN: Primary | ICD-10-CM

## (undated) DEVICE — NEEDLE SPNL 22GA L5IN BLK HUB QNCKE BVL DISP

## (undated) DEVICE — SKIN REG/FINE DUAL MARKER, RULER, LABELS: Brand: MEDLINE

## (undated) DEVICE — BANDAGE ADH 1INX3IN NAT FAB N ADH PD CURAD

## (undated) DEVICE — PAIN TRAY: Brand: MEDLINE INDUSTRIES, INC.

## (undated) DEVICE — EXTENSION SET, MALE LUER LOCK ADAPTER

## (undated) DEVICE — NEEDLE SPINAL 22X5 405148

## (undated) DEVICE — REMOVER LOT 4OZ N IRRIG UNSCNT SFT MOIST LIQ

## (undated) DEVICE — GLOVE SURG SENSICARE SZ 7-1/2

## (undated) DEVICE — GLOVE,SURG,SENSICARE,ALOE,LF,PF,7: Brand: MEDLINE

## (undated) DEVICE — BANDAID COVERLET 1X3

## (undated) DEVICE — REMOVER DURAPREP 3M

## (undated) DEVICE — MARKER SKIN 2 TIP

## (undated) DEVICE — GLOVE SURG SENSICARE SZ 7

## (undated) DEVICE — NEEDLE SPNL 22GA L3.5IN BLK QNCKE STYL DISP

## (undated) DEVICE — GLOVE SURG SENSICARE SZ 6-1/2

## (undated) NOTE — LETTER
Angella Dub 38   Date:   2/3/2020     Name:   Jennie Hwang    YOB: 1965   MRN:   [id number]       [facility]? Osmani the areas on your body where you feel the described sensations.   Use the appropriate

## (undated) NOTE — LETTER
Date: 12/29/2021    Patient Name: Karime Cj          To Whom it may concern: The above patient was seen at the [facility] for treatment of an acute illness.     This patient should be excused from attending work/school starting 12/

## (undated) NOTE — LETTER
WHERE IS YOUR PAIN NOW?  Heather the areas on your body where you feel the described sensations.  Use the appropriate symbol.  Heather the areas of radiation.  Include all affected areas.  Just to complete the picture, please draw in the face.     ACHE:  ^ ^ ^   NUMBNESS:  0000   PINS & NEEDLES:  = = = =                              ^ ^ ^                       0000              = = = =                                    ^ ^ ^                       0000            = = = =      BURNING:  XXXX   STABBING: ////                  XXXX                ////                         XXXX          ////     Please heather the line below indicating your degree of pain right now  with 0 being no pain 10 being the worst pain possible.                                         0             1             2              3             4              5              6              7             8             9             10         Patient Signature:

## (undated) NOTE — LETTER
AUTHORIZATION FOR SURGICAL OPERATION OR OTHER PROCEDURE    1. I hereby authorize Dr. Slime Siu, and [facility] staff assigned to my case to perform the following operation and/or procedure at the [facility]:    _______________________________________________________________________________________________    Cortisone Injection Left foot  _______________________________________________________________________________________________    2. My physician has explained the nature and purpose of the operation or other procedure, possible alternative methods of treatment, the risks involved, and the possibility of complication to me. I acknowledge that no guarantee has been made as to the result that may be obtained. 3.  I recognize that, during the course of this operation, or other procedure, unforseen conditions may necessitate additional or different procedure than those listed above. I, therefore, further authorize and request that the above named physician, his/her physician assistants or designees perform such procedures as are, in his/her professional opinion, necessary and desirable. 4.  Any tissue or organs removed in the operation or other procedure may be disposed of by and at the discretion of the [facility] and [facility]. 5.  I understand that in the event of a medical emergency, I will be transported by local paramedics to [facility] or other hospital emergency department. 6.  I certify that I have read and fully understand the above consent to operation and/or other procedure. 7.  I acknowledge that my physician has explained sedation/analgesia administration to me including the risks and benefits. I consent to the administration of sedation/analgesia as may be necessary or desirable in the judgement of my physician.     Witness signature: ___________________________________________________ Date:  ______/______/_____ Time:  ________ A. M.  P.M. Patient Name:  ______________________________________________________  (please print)      Patient signature:  ___________________________________________________             Relationship to Patient:           []  Parent    Responsible person                          []  Spouse  In case of minor or                    [] Other  _____________   Incompetent name:  __________________________________________________                               (please print)      _____________      Responsible person  In case of minor or  Incompetent signature:  _______________________________________________    Statement of Physician  My signature below affirms that prior to the time of the procedure, I have explained to the patient and/or his/her guardian, the risks and benefits involved in the proposed treatment and any reasonable alternative to the proposed treatment. I have also explained the risks and benefits involved in the refusal of the proposed treatment and have answered the patient's questions.                         Date:  ______/______/_______  Provider                      Signature:  __________________________________________________________       Time:  ___________ A.M    P.M.

## (undated) NOTE — Clinical Note
Dear Ervin Ag MD Our mutual Steve Correa  is participating in our medical weight-loss program. We have been working together on making lifestyle changes regarding nutrition, behaviors, and physical activity.   Please feel free to contact me with

## (undated) NOTE — LETTER
25    Trupti Long  :  1965    To Whom It May Concern:    This patient was seen in our office on 25. She has persistent, chronic left foot and ankle issues for which she sees me for care. She should be allowed to wear flats or sneakers while working.    If this office may be of further assistance, please do not hesitate to contact us.      Sincerely,        Jozef Mccray, DO

## (undated) NOTE — LETTER
Date: 8/13/2023    Patient Name: Sanchez Escalera          To Whom it may concern: This letter has been written at the patient's request. The above patient was seen at the [facility] for treatment of a medical condition. The patient may return to work starting on 08/15/2023 and must wear a mask around others through 08/20/2023.       Sincerely,          DIONNE Bowman

## (undated) NOTE — MR AVS SNAPSHOT
After Visit Summary   6/12/2023    Ana Luna   MRN: [id number]           Visit Information     Date & Time  6/12/2023  6:00 PM Provider  DION[facility] Pro Department  Charleen Love, Virtual Visit Dept. Phone  [phone]      Allergies as of 6/12/2023  Review status set to In Progress on 6/12/2023   No Known Allergies     Your Current Medications        Dosage    Phentermine HCl 37.5 MG Oral Tab Take 1 tablet (37.5 mg total) by mouth before breakfast.    celecoxib 100 MG Oral Cap Take 1 capsule (100 mg total) by mouth 2 (two) times daily as needed for Pain.    hydroCHLOROthiazide 25 MG Oral Tab TAKE 1 TABLET DAILY      Diagnoses for This Visit    Administrative encounter   [149386]  -  Primary           Future Appointments        Provider Department    6/28/2023 1:30 PM Leanna Sanders 36. in [facility]                Did you know that [facility] primary care physicians now offer Video Visits through [address] for adult patients for a variety of conditions such as allergies, back pain and cold symptoms? Skip the drive and waiting room and online chat with a doctor face-to-face using your web-cam enabled computer or mobile device wherever you are. Video Visits cost $50 and can be paid hassle-free using a credit, debit, or health savings card. Not active on [employer]? Ask us how to get signed up today! If you receive a survey from [employer], please take a few minutes to complete it and provide feedback. We strive to deliver the best patient experience and are looking for ways to make improvements. Your feedback will help us do so. For more information on [employer], please visit [email or web address]           No text in SmartText           No text in SmartText

## (undated) NOTE — LETTER
[facility] [address] , [place]   Date:   2/10/2023     Name:   Reji Martines    YOB: 1965   MRN:   [id number]       WHERE IS YOUR PAIN NOW? Heather the areas on your body where you feel the described sensations. Use the appropriate symbol. Cyndi Sickle the areas of radiation. Include all affected areas. Just to complete the picture, please draw in the face. ACHE:  ^ ^ ^   NUMBNESS:  0000   PINS & NEEDLES:  = = = =                              ^ ^ ^                       0000              = = = =                                    ^ ^ ^                       0000            = = = =      BURNING:  XXXX   STABBING: ////                  XXXX                ////                         XXXX          ////     Please heather the line below indicating your degree of pain right now  with 0 being no pain 10 being the worst pain possible.                                          0             1             2              3             4              5              6              7             8             9             10         Patient Signature:

## (undated) NOTE — MR AVS SNAPSHOT
[address] [facility] [address] [phone]               Thank you for choosing us for your health care visit with Pearl Krueger MD.  We are glad to serve you and happy to provide you with this summary of your US PELVIS W EV (CPT=76856/96700)    Complete by:   Mar 13, 2017 (Approximate)    Assoc Dx:  Abdominal bloating [R14.0], Right adnexal tenderness [R10.2]           Gastro Referral - In Network    Complete by:  As directed    Assoc Dx:  Colon cancer sc authorization, such as [place], please feel free to schedule your appointment immediately. However, if you are unsure about the requirements for authorization, please wait 5-7 days and then contact your physician's office.  At that time, you will None      Allergies as of Mar 13, 2017     Augmentin, [Amoxicillin-Pot Clavulanate] Rash, Swelling                Today's Vital Signs     BP Pulse Height Weight BMI    120/84 mmHg 94 67\" 212 lb 33.20 kg/m2         Current Medications          This list i

## (undated) NOTE — ED AVS SNAPSHOT
Asiya Vaughn   MRN: [id number]    Department:  [facility] Emergency Department in [place]   Date of Visit:  5/21/2018           Disclosure     Insurance plans vary and the physician(s) referred by the ER may not be covered by your plan.  Please con tell this physician (or your personal doctor if your instructions are to return to your personal doctor) about any new or lasting problems. The primary care or specialist physician will see patients referred from the [facility] Emergency Department.  Arthor Bernheim

## (undated) NOTE — MR AVS SNAPSHOT
[address] [place] [address] [phone]               Thank you for choosing us for your health care visit with Natalie Contreras MD.  We are glad to serve you and happy to provide you with this summary of your - [address] AT [facility], [phone], [address] [place] [address],[facility], [facility] [address]     Phone:  [phone]    - guaiFENesin-codeine 100-10 MG/5ML Soln  - Meclizine HCl 25 MG Tabs            Today's Orders     OPHTH Visits < Visit Summaries. MyChart questions? Call [phone] for help. Ofidiumhart is NOT to be used for urgent needs. For medical emergencies, dial 911.            Visit [email or web address] online at  [email or web address]

## (undated) NOTE — Clinical Note
Dear Roma Paredes MD Our mutual Aravind Bui  is participating in our medical weight-loss program. We have been working together on making lifestyle changes regarding nutrition, behaviors, and physical activity.   Please feel free to contact me with

## (undated) NOTE — LETTER
[facility] SURGERY SCHEDULING FORM   [address] [place] [address] [facility]   [phone] (scheduling phone) [phone] (scheduling fax)     PATIENT INFORMATION   Last Name:      Yg Hoffman      First Name:    Eligio Hill []  No or using our own   Allergies: Patient has no known allergies.          Completed by:    Melanie Medrano      Date:    12/16/2020

## (undated) NOTE — Clinical Note
Dear Chanda Butts MD Our mutual Elsa Chinchilla  is participating in our medical weight-loss program. We have been working together on making lifestyle changes regarding nutrition, behaviors, and physical activity.   Please feel free to contact me with

## (undated) NOTE — LETTER
10/23/17        [address] [address]      Dear Maggie Forbes,    [address] [facility] records indicate that you have outstanding lab work and or testing that was ordered for you and has not yet been completed:          Vitamin D, 25-Hydro

## (undated) NOTE — Clinical Note
Dear Roma Paredes MD Lost #14 lbs of weight loss! Jay Panchal  is participating in our medical weight-loss program. We have been working together on making lifestyle changes regarding nutrition, behaviors, and physical activity.   Please

## (undated) NOTE — LETTER
06/22/19        [address] [id number]      Dear BRINDA,    [address] [facility] records indicate that you have outstanding lab work and or testing that was ordered for you and has not yet been completed:  Orders Placed This Encounte

## (undated) NOTE — LETTER
Lesli Fagan  July 29, 2021        To Whom it may concern: The above patient conducted a virtual visit with a provider from [facility] for treatment of a medical condition.     This patient should be excused from attending work start

## (undated) NOTE — LETTER
05/14/18    The above patient is under our care and is currently prescribed Phentermine 15mg for diagnosis of obesity. Her BMI is between 30-39. If you have any questions or concerns please contact our office.  Sincerely,

## (undated) NOTE — LETTER
Date: 5/14/2018    Patient Name: Stacy Hughes          To Whom it may concern: This letter has been written at the patient's request. The above patient was seen at the [facility] for treatment of a medical condition.      The above pa

## (undated) NOTE — Clinical Note
Date: 1/9/2017    Patient Name: Asiya Vaughn          To Whom it may concern: The above patient was seen at the [facility] for treatment of a medical condition.     This patient should be excused from attending work from 1/6/2017-1/15

## (undated) NOTE — LETTER
[address]   Date:   5/20/2021     Name:   Marty Shine    YOB: 1965   MRN:   [id number]       WHERE IS YOUR PAIN NOW? Osmani the areas on your body where you feel the described sensations.   Use the appropriate

## (undated) NOTE — LETTER
Date: 5/21/2024    Patient Name: Trupti Long          To Whom it may concern:    The above patient has been seen at [facility] for treatment of an ongoing medical condition.    This patient should permitted to wear footwear that is supportive including shoes like sneakers.         Sincerely,        ZEE LEW MD

## (undated) NOTE — ED AVS SNAPSHOT
[facility] in [address] [address] [address]    Phone:  [phone]    Fax:  [facility]   MRN: [id number]    Department:  [facility] in [address] [place] [address]   Date of Visit: benefit level being available to you or other limited reimbursement. The physician may seek payment directly from you for amounts other than your deductible, co-payment, or co-insurance and for other services not covered under your health insurance plan. If you believe that any of the medications or instructions on this list is different from what your Primary Care doctor has instructed you - please continue to take your medications as instructed by your Primary Care doctor until you can check with your do Patient [address] to help you get signed up for insurance coverage. Patient [address] is a Federal Navigator program that can help with your Affordable Care Act coverage, as well as all types of Medicaid plans.   To get signed up and covere

## (undated) NOTE — LETTER
[address]   Date:   12/15/2020     Name:   Pari Garcia    YOB: 1965   MRN:   [id number]       WHERE IS YOUR PAIN NOW? Osmani the areas on your body where you feel the described sensations.   Use the appropriat

## (undated) NOTE — LETTER
[address]   Date:   11/12/2020     Name:   Jesi Lu    YOB: 1965   MRN:   [id number]       WHERE IS YOUR PAIN NOW? Osmani the areas on your body where you feel the described sensations.   Use the appropriat